# Patient Record
Sex: FEMALE | Race: WHITE | Employment: FULL TIME | URBAN - METROPOLITAN AREA
[De-identification: names, ages, dates, MRNs, and addresses within clinical notes are randomized per-mention and may not be internally consistent; named-entity substitution may affect disease eponyms.]

---

## 2018-04-12 ENCOUNTER — OFFICE VISIT (OUTPATIENT)
Dept: FAMILY MEDICINE CLINIC | Age: 38
End: 2018-04-12
Payer: MEDICAID

## 2018-04-12 VITALS
BODY MASS INDEX: 37.74 KG/M2 | WEIGHT: 213 LBS | DIASTOLIC BLOOD PRESSURE: 78 MMHG | HEART RATE: 83 BPM | OXYGEN SATURATION: 97 % | RESPIRATION RATE: 18 BRPM | SYSTOLIC BLOOD PRESSURE: 110 MMHG | TEMPERATURE: 98 F | HEIGHT: 63 IN

## 2018-04-12 DIAGNOSIS — J01.90 ACUTE BACTERIAL SINUSITIS: Primary | ICD-10-CM

## 2018-04-12 DIAGNOSIS — H69.80 DYSFUNCTION OF EUSTACHIAN TUBE, UNSPECIFIED LATERALITY: ICD-10-CM

## 2018-04-12 DIAGNOSIS — R68.89 FLU-LIKE SYMPTOMS: ICD-10-CM

## 2018-04-12 DIAGNOSIS — B96.89 ACUTE BACTERIAL SINUSITIS: Primary | ICD-10-CM

## 2018-04-12 LAB
INFLUENZA A ANTIGEN, POC: NORMAL
INFLUENZA B ANTIGEN, POC: NORMAL

## 2018-04-12 PROCEDURE — 99213 OFFICE O/P EST LOW 20 MIN: CPT | Performed by: PHYSICIAN ASSISTANT

## 2018-04-12 PROCEDURE — 87804 INFLUENZA ASSAY W/OPTIC: CPT | Performed by: PHYSICIAN ASSISTANT

## 2018-04-12 RX ORDER — AZITHROMYCIN 250 MG/1
TABLET, FILM COATED ORAL
Qty: 6 TABLET | Refills: 0 | Status: SHIPPED | OUTPATIENT
Start: 2018-04-12 | End: 2018-04-23 | Stop reason: CLARIF

## 2018-04-12 RX ORDER — PREDNISONE 20 MG/1
40 TABLET ORAL DAILY
Qty: 10 TABLET | Refills: 0 | Status: SHIPPED | OUTPATIENT
Start: 2018-04-12 | End: 2018-04-17

## 2018-04-20 ENCOUNTER — HOSPITAL ENCOUNTER (OUTPATIENT)
Dept: GENERAL RADIOLOGY | Age: 38
Discharge: HOME | End: 2018-04-22
Payer: MEDICAID

## 2018-04-20 ENCOUNTER — OFFICE VISIT (OUTPATIENT)
Dept: FAMILY MEDICINE CLINIC | Age: 38
End: 2018-04-20
Payer: MEDICAID

## 2018-04-20 ENCOUNTER — HOSPITAL ENCOUNTER (OUTPATIENT)
Age: 38
Discharge: HOME | End: 2018-04-22
Payer: MEDICAID

## 2018-04-20 VITALS
HEART RATE: 105 BPM | WEIGHT: 216 LBS | TEMPERATURE: 99.1 F | DIASTOLIC BLOOD PRESSURE: 70 MMHG | SYSTOLIC BLOOD PRESSURE: 100 MMHG | BODY MASS INDEX: 38.27 KG/M2 | RESPIRATION RATE: 12 BRPM | OXYGEN SATURATION: 96 % | HEIGHT: 63 IN

## 2018-04-20 DIAGNOSIS — J06.9 ACUTE URI: Primary | ICD-10-CM

## 2018-04-20 LAB
INFLUENZA A ANTIBODY: NEGATIVE
INFLUENZA B ANTIBODY: NEGATIVE

## 2018-04-20 PROCEDURE — 4004F PT TOBACCO SCREEN RCVD TLK: CPT | Performed by: PHYSICIAN ASSISTANT

## 2018-04-20 PROCEDURE — G8427 DOCREV CUR MEDS BY ELIG CLIN: HCPCS | Performed by: PHYSICIAN ASSISTANT

## 2018-04-20 PROCEDURE — G8417 CALC BMI ABV UP PARAM F/U: HCPCS | Performed by: PHYSICIAN ASSISTANT

## 2018-04-20 PROCEDURE — 87804 INFLUENZA ASSAY W/OPTIC: CPT | Performed by: PHYSICIAN ASSISTANT

## 2018-04-20 PROCEDURE — 99213 OFFICE O/P EST LOW 20 MIN: CPT | Performed by: PHYSICIAN ASSISTANT

## 2018-04-20 PROCEDURE — 71046 X-RAY EXAM CHEST 2 VIEWS: CPT

## 2018-04-20 RX ORDER — PREDNISONE 10 MG/1
TABLET ORAL
Qty: 20 TABLET | Refills: 0 | Status: SHIPPED | OUTPATIENT
Start: 2018-04-20 | End: 2018-04-30

## 2018-04-20 RX ORDER — FLUCONAZOLE 150 MG/1
150 TABLET ORAL DAILY
Qty: 2 TABLET | Refills: 0 | Status: SHIPPED | OUTPATIENT
Start: 2018-04-20 | End: 2018-04-22

## 2018-04-20 RX ORDER — LORATADINE AND PSEUDOEPHEDRINE 10; 240 MG/1; MG/1
1 TABLET, EXTENDED RELEASE ORAL DAILY
Qty: 7 TABLET | Refills: 0 | Status: SHIPPED | OUTPATIENT
Start: 2018-04-20 | End: 2018-04-27

## 2018-04-20 RX ORDER — FLUTICASONE PROPIONATE 50 MCG
1 SPRAY, SUSPENSION (ML) NASAL DAILY
Qty: 1 BOTTLE | Refills: 0 | Status: SHIPPED | OUTPATIENT
Start: 2018-04-20 | End: 2018-04-23 | Stop reason: CLARIF

## 2018-04-20 RX ORDER — DOXYCYCLINE HYCLATE 100 MG
100 TABLET ORAL 2 TIMES DAILY
Qty: 20 TABLET | Refills: 0 | Status: SHIPPED | OUTPATIENT
Start: 2018-04-20 | End: 2018-04-30

## 2018-04-23 ENCOUNTER — OFFICE VISIT (OUTPATIENT)
Dept: FAMILY MEDICINE CLINIC | Age: 38
End: 2018-04-23
Payer: MEDICAID

## 2018-04-23 VITALS
SYSTOLIC BLOOD PRESSURE: 128 MMHG | HEART RATE: 89 BPM | DIASTOLIC BLOOD PRESSURE: 86 MMHG | OXYGEN SATURATION: 97 % | HEIGHT: 63 IN | RESPIRATION RATE: 20 BRPM | BODY MASS INDEX: 38.59 KG/M2 | WEIGHT: 217.8 LBS | TEMPERATURE: 97.8 F

## 2018-04-23 DIAGNOSIS — J40 BRONCHITIS: ICD-10-CM

## 2018-04-23 DIAGNOSIS — B96.89 ACUTE BACTERIAL SINUSITIS: ICD-10-CM

## 2018-04-23 DIAGNOSIS — J01.90 ACUTE BACTERIAL SINUSITIS: ICD-10-CM

## 2018-04-23 DIAGNOSIS — J18.9 PNEUMONIA OF BOTH LOWER LOBES DUE TO INFECTIOUS ORGANISM: Primary | ICD-10-CM

## 2018-04-23 PROCEDURE — G8417 CALC BMI ABV UP PARAM F/U: HCPCS | Performed by: FAMILY MEDICINE

## 2018-04-23 PROCEDURE — 4004F PT TOBACCO SCREEN RCVD TLK: CPT | Performed by: FAMILY MEDICINE

## 2018-04-23 PROCEDURE — 96372 THER/PROPH/DIAG INJ SC/IM: CPT | Performed by: FAMILY MEDICINE

## 2018-04-23 PROCEDURE — 99213 OFFICE O/P EST LOW 20 MIN: CPT | Performed by: FAMILY MEDICINE

## 2018-04-23 PROCEDURE — G8427 DOCREV CUR MEDS BY ELIG CLIN: HCPCS | Performed by: FAMILY MEDICINE

## 2018-04-23 RX ORDER — FLUTICASONE FUROATE AND VILANTEROL 100; 25 UG/1; UG/1
1 POWDER RESPIRATORY (INHALATION) DAILY
Qty: 1 EACH | Refills: 0 | COMMUNITY
Start: 2018-04-23 | End: 2018-05-04 | Stop reason: ALTCHOICE

## 2018-04-23 RX ORDER — DEXAMETHASONE SODIUM PHOSPHATE 4 MG/ML
4 INJECTION, SOLUTION INTRA-ARTICULAR; INTRALESIONAL; INTRAMUSCULAR; INTRAVENOUS; SOFT TISSUE ONCE
Status: COMPLETED | OUTPATIENT
Start: 2018-04-23 | End: 2018-04-23

## 2018-04-23 RX ORDER — GUAIFENESIN/DEXTROMETHORPHAN 100-10MG/5
10 SYRUP ORAL 3 TIMES DAILY PRN
Qty: 240 ML | Refills: 3 | Status: SHIPPED | OUTPATIENT
Start: 2018-04-23 | End: 2018-05-03

## 2018-04-23 RX ADMIN — DEXAMETHASONE SODIUM PHOSPHATE 4 MG: 4 INJECTION, SOLUTION INTRA-ARTICULAR; INTRALESIONAL; INTRAMUSCULAR; INTRAVENOUS; SOFT TISSUE at 11:33

## 2018-04-23 ASSESSMENT — ENCOUNTER SYMPTOMS
DIARRHEA: 0
HEARTBURN: 0
PHOTOPHOBIA: 0
BLURRED VISION: 0
EYES NEGATIVE: 1
WHEEZING: 0
CONSTIPATION: 0
SHORTNESS OF BREATH: 1
SINUS PAIN: 0
BACK PAIN: 0
ORTHOPNEA: 0
STRIDOR: 0
EYE DISCHARGE: 0
RHINORRHEA: 0
COUGH: 1
DOUBLE VISION: 0
EYE REDNESS: 0
BLOOD IN STOOL: 0
GASTROINTESTINAL NEGATIVE: 1
EYE PAIN: 0
SORE THROAT: 1
HEMOPTYSIS: 0

## 2018-04-23 ASSESSMENT — PATIENT HEALTH QUESTIONNAIRE - PHQ9
1. LITTLE INTEREST OR PLEASURE IN DOING THINGS: 0
2. FEELING DOWN, DEPRESSED OR HOPELESS: 0
SUM OF ALL RESPONSES TO PHQ QUESTIONS 1-9: 0
SUM OF ALL RESPONSES TO PHQ9 QUESTIONS 1 & 2: 0

## 2018-04-30 ENCOUNTER — HOSPITAL ENCOUNTER (OUTPATIENT)
Age: 38
Discharge: HOME | End: 2018-05-02
Payer: MEDICAID

## 2018-04-30 ENCOUNTER — HOSPITAL ENCOUNTER (OUTPATIENT)
Dept: GENERAL RADIOLOGY | Age: 38
Discharge: HOME | End: 2018-05-02
Payer: MEDICAID

## 2018-04-30 ENCOUNTER — TELEPHONE (OUTPATIENT)
Dept: FAMILY MEDICINE CLINIC | Age: 38
End: 2018-04-30

## 2018-04-30 ENCOUNTER — NURSE ONLY (OUTPATIENT)
Dept: FAMILY MEDICINE CLINIC | Age: 38
End: 2018-04-30
Payer: MEDICAID

## 2018-04-30 DIAGNOSIS — J18.9 PNEUMONIA, UNSPECIFIED ORGANISM: Primary | ICD-10-CM

## 2018-04-30 DIAGNOSIS — J18.9 PNEUMONIA DUE TO INFECTIOUS ORGANISM, UNSPECIFIED LATERALITY, UNSPECIFIED PART OF LUNG: Primary | ICD-10-CM

## 2018-04-30 DIAGNOSIS — J18.9 PNEUMONIA DUE TO INFECTIOUS ORGANISM, UNSPECIFIED LATERALITY, UNSPECIFIED PART OF LUNG: ICD-10-CM

## 2018-04-30 PROCEDURE — 71046 X-RAY EXAM CHEST 2 VIEWS: CPT

## 2018-04-30 PROCEDURE — 96372 THER/PROPH/DIAG INJ SC/IM: CPT | Performed by: FAMILY MEDICINE

## 2018-04-30 RX ORDER — DEXAMETHASONE SODIUM PHOSPHATE 4 MG/ML
4 INJECTION, SOLUTION INTRA-ARTICULAR; INTRALESIONAL; INTRAMUSCULAR; INTRAVENOUS; SOFT TISSUE ONCE
Status: COMPLETED | OUTPATIENT
Start: 2018-04-30 | End: 2018-04-30

## 2018-04-30 RX ADMIN — DEXAMETHASONE SODIUM PHOSPHATE 4 MG: 4 INJECTION, SOLUTION INTRA-ARTICULAR; INTRALESIONAL; INTRAMUSCULAR; INTRAVENOUS; SOFT TISSUE at 13:39

## 2018-05-04 ENCOUNTER — OFFICE VISIT (OUTPATIENT)
Dept: FAMILY MEDICINE CLINIC | Age: 38
End: 2018-05-04
Payer: MEDICAID

## 2018-05-04 VITALS
BODY MASS INDEX: 38.45 KG/M2 | HEART RATE: 93 BPM | OXYGEN SATURATION: 98 % | HEIGHT: 63 IN | SYSTOLIC BLOOD PRESSURE: 134 MMHG | WEIGHT: 217 LBS | DIASTOLIC BLOOD PRESSURE: 76 MMHG | TEMPERATURE: 97.8 F

## 2018-05-04 DIAGNOSIS — R53.83 FATIGUE, UNSPECIFIED TYPE: ICD-10-CM

## 2018-05-04 DIAGNOSIS — J40 BRONCHITIS: Primary | ICD-10-CM

## 2018-05-04 DIAGNOSIS — R73.01 IFG (IMPAIRED FASTING GLUCOSE): ICD-10-CM

## 2018-05-04 DIAGNOSIS — R05.9 COUGH: ICD-10-CM

## 2018-05-04 DIAGNOSIS — E78.5 DYSLIPIDEMIA: ICD-10-CM

## 2018-05-04 PROCEDURE — 99213 OFFICE O/P EST LOW 20 MIN: CPT | Performed by: FAMILY MEDICINE

## 2018-05-04 PROCEDURE — 96372 THER/PROPH/DIAG INJ SC/IM: CPT | Performed by: FAMILY MEDICINE

## 2018-05-04 PROCEDURE — G8427 DOCREV CUR MEDS BY ELIG CLIN: HCPCS | Performed by: FAMILY MEDICINE

## 2018-05-04 PROCEDURE — G8417 CALC BMI ABV UP PARAM F/U: HCPCS | Performed by: FAMILY MEDICINE

## 2018-05-04 PROCEDURE — 4004F PT TOBACCO SCREEN RCVD TLK: CPT | Performed by: FAMILY MEDICINE

## 2018-05-04 RX ORDER — FLUTICASONE PROPIONATE 50 MCG
1 SPRAY, SUSPENSION (ML) NASAL DAILY
Qty: 1 BOTTLE | Refills: 3 | Status: SHIPPED | OUTPATIENT
Start: 2018-05-04 | End: 2018-09-27 | Stop reason: ALTCHOICE

## 2018-05-04 RX ORDER — METHYLPREDNISOLONE 4 MG/1
TABLET ORAL
Qty: 1 KIT | Refills: 0 | Status: SHIPPED | OUTPATIENT
Start: 2018-05-04 | End: 2018-05-10

## 2018-05-04 RX ORDER — AZITHROMYCIN 250 MG/1
TABLET, FILM COATED ORAL
Qty: 1 PACKET | Refills: 0 | Status: SHIPPED | OUTPATIENT
Start: 2018-05-04 | End: 2018-05-08

## 2018-05-04 RX ORDER — BUDESONIDE AND FORMOTEROL FUMARATE DIHYDRATE 160; 4.5 UG/1; UG/1
2 AEROSOL RESPIRATORY (INHALATION) 2 TIMES DAILY
Qty: 1 INHALER | Refills: 3 | COMMUNITY
Start: 2018-05-04 | End: 2018-09-27 | Stop reason: ALTCHOICE

## 2018-05-04 RX ORDER — DEXAMETHASONE SODIUM PHOSPHATE 4 MG/ML
4 INJECTION, SOLUTION INTRA-ARTICULAR; INTRALESIONAL; INTRAMUSCULAR; INTRAVENOUS; SOFT TISSUE ONCE
Status: COMPLETED | OUTPATIENT
Start: 2018-05-04 | End: 2018-05-04

## 2018-05-04 RX ORDER — GUAIFENESIN 600 MG/1
600 TABLET, EXTENDED RELEASE ORAL 2 TIMES DAILY
Qty: 100 TABLET | Refills: 3 | Status: SHIPPED | OUTPATIENT
Start: 2018-05-04 | End: 2018-09-27 | Stop reason: ALTCHOICE

## 2018-05-04 RX ADMIN — DEXAMETHASONE SODIUM PHOSPHATE 4 MG: 4 INJECTION, SOLUTION INTRA-ARTICULAR; INTRALESIONAL; INTRAMUSCULAR; INTRAVENOUS; SOFT TISSUE at 11:48

## 2018-05-04 ASSESSMENT — ENCOUNTER SYMPTOMS
SINUS PAIN: 0
BACK PAIN: 0
SORE THROAT: 0
EYES NEGATIVE: 1
NAUSEA: 0
BLOOD IN STOOL: 0
PHOTOPHOBIA: 0
HEMOPTYSIS: 0
EYE DISCHARGE: 0
HEARTBURN: 0
CONSTIPATION: 0
BLURRED VISION: 0
EYE REDNESS: 0
STRIDOR: 0
GASTROINTESTINAL NEGATIVE: 1
ORTHOPNEA: 0
DIARRHEA: 0
DOUBLE VISION: 0
VOMITING: 0
SHORTNESS OF BREATH: 0
EYE PAIN: 0
RHINORRHEA: 0
COUGH: 1
ABDOMINAL PAIN: 0
WHEEZING: 0

## 2018-07-19 ENCOUNTER — HOSPITAL ENCOUNTER (EMERGENCY)
Age: 38
Discharge: HOME | End: 2018-07-20
Attending: EMERGENCY MEDICINE
Payer: MEDICAID

## 2018-07-19 VITALS
SYSTOLIC BLOOD PRESSURE: 154 MMHG | DIASTOLIC BLOOD PRESSURE: 96 MMHG | HEIGHT: 62 IN | TEMPERATURE: 98 F | WEIGHT: 220 LBS | HEART RATE: 104 BPM | OXYGEN SATURATION: 98 % | RESPIRATION RATE: 16 BRPM | BODY MASS INDEX: 40.48 KG/M2

## 2018-07-19 DIAGNOSIS — S05.02XA ABRASION OF LEFT CORNEA, INITIAL ENCOUNTER: Primary | ICD-10-CM

## 2018-07-19 PROCEDURE — 99282 EMERGENCY DEPT VISIT SF MDM: CPT

## 2018-07-19 PROCEDURE — 6370000000 HC RX 637 (ALT 250 FOR IP): Performed by: EMERGENCY MEDICINE

## 2018-07-19 RX ORDER — TETRACAINE HYDROCHLORIDE 5 MG/ML
2 SOLUTION OPHTHALMIC ONCE
Status: COMPLETED | OUTPATIENT
Start: 2018-07-19 | End: 2018-07-19

## 2018-07-19 RX ORDER — TOBRAMYCIN 3 MG/ML
1 SOLUTION/ DROPS OPHTHALMIC EVERY 4 HOURS
Qty: 1 BOTTLE | Refills: 0 | Status: SHIPPED | OUTPATIENT
Start: 2018-07-19 | End: 2018-07-29

## 2018-07-19 RX ORDER — OXYCODONE HYDROCHLORIDE AND ACETAMINOPHEN 5; 325 MG/1; MG/1
1 TABLET ORAL ONCE
Status: COMPLETED | OUTPATIENT
Start: 2018-07-19 | End: 2018-07-19

## 2018-07-19 RX ORDER — HYDROCODONE BITARTRATE AND ACETAMINOPHEN 5; 325 MG/1; MG/1
1 TABLET ORAL EVERY 6 HOURS PRN
Qty: 12 TABLET | Refills: 0 | Status: SHIPPED | OUTPATIENT
Start: 2018-07-19 | End: 2018-07-22

## 2018-07-19 RX ADMIN — TETRACAINE HYDROCHLORIDE 2 DROP: 5 SOLUTION OPHTHALMIC at 22:37

## 2018-07-19 RX ADMIN — OXYCODONE HYDROCHLORIDE AND ACETAMINOPHEN 1 TABLET: 5; 325 TABLET ORAL at 23:56

## 2018-07-19 RX ADMIN — FLUORESCEIN SODIUM 1 EACH: 0.6 STRIP OPHTHALMIC at 22:37

## 2018-07-19 ASSESSMENT — PAIN SCALES - GENERAL
PAINLEVEL_OUTOF10: 9
PAINLEVEL_OUTOF10: 9

## 2018-07-19 ASSESSMENT — PAIN DESCRIPTION - PAIN TYPE: TYPE: ACUTE PAIN

## 2018-07-19 ASSESSMENT — PAIN DESCRIPTION - ORIENTATION: ORIENTATION: LEFT

## 2018-07-19 ASSESSMENT — PAIN DESCRIPTION - LOCATION: LOCATION: EYE

## 2018-07-20 ASSESSMENT — ENCOUNTER SYMPTOMS
SHORTNESS OF BREATH: 0
WHEEZING: 0
COUGH: 0
NAUSEA: 0
SORE THROAT: 0
BLURRED VISION: 1
EYE PAIN: 1
VOMITING: 0
EYE INFLAMMATION: 1
PERI-ORBITAL EDEMA: 1
ABDOMINAL DISTENTION: 0
DIARRHEA: 0
SINUS PRESSURE: 0
EYE WATERING: 1
BACK PAIN: 0
EYE REDNESS: 0
PHOTOPHOBIA: 1
EYE DISCHARGE: 0

## 2018-07-20 NOTE — ED PROVIDER NOTES
51-year-old female presenting with a left eye injury approx. 3 hours prior to arrival. States she was playing with her dog when the dog suddenly kicked time leg scratcher in the left eye. She states she is having trouble seeing out of the eye due to pain. States she  is unable to open the eye due to pain. She denies LOC. Denies contact lens use. The history is provided by the patient. Eye Problem   Location:  Left eye  Quality:  Aching, burning and stabbing  Severity:  Severe  Onset quality:  Sudden  Duration:  3 hours  Timing:  Constant  Progression:  Unchanged  Chronicity:  New  Context: direct trauma    Context: not contact lens problem    Relieved by:  Nothing  Worsened by:  Bright light  Ineffective treatments:  Closing eye and darkened room  Associated symptoms: blurred vision, inflammation, photophobia, swelling and tearing    Associated symptoms: no discharge, no headaches, no nausea, no redness, no vomiting and no weakness    Risk factors: no previous injury to eye        Review of Systems   Constitutional: Negative for chills and fever. HENT: Negative for ear pain, sinus pressure and sore throat. Eyes: Positive for blurred vision, photophobia and pain. Negative for discharge and redness. Respiratory: Negative for cough, shortness of breath and wheezing. Cardiovascular: Negative for chest pain. Gastrointestinal: Negative for abdominal distention, diarrhea, nausea and vomiting. Genitourinary: Negative for dysuria and frequency. Musculoskeletal: Negative for arthralgias and back pain. Skin: Negative for rash and wound. Neurological: Negative for weakness and headaches. Hematological: Negative for adenopathy. All other systems reviewed and are negative. Physical Exam   Constitutional: She is oriented to person, place, and time. She appears well-developed and well-nourished. HENT:   Head: Normocephalic and atraumatic.    Eyes: Conjunctivae are normal.   Left eye History:  reports that she has been smoking Cigarettes. She has a 7.50 pack-year smoking history. She has never used smokeless tobacco. She reports that she drinks alcohol. She reports that she does not use drugs. Family History: family history includes Arthritis in her mother; Asthma in her mother; Diabetes in her paternal grandmother; Heart Disease in her maternal grandmother; Heart Failure in her paternal grandmother; High Blood Pressure in her mother. The patients home medications have been reviewed. Allergies: Cephalexin and Pcn [penicillins]    -------------------------------------------------- RESULTS -------------------------------------------------  Labs:  No results found for this visit on 07/19/18. Radiology:  No orders to display       ------------------------- NURSING NOTES AND VITALS REVIEWED ---------------------------  Date / Time Roomed:  7/19/2018 10:10 PM  ED Bed Assignment:  01/01    The nursing notes within the ED encounter and vital signs as below have been reviewed. BP (!) 154/96   Pulse 104   Temp 98 °F (36.7 °C)   Resp 16   Ht 5' 2\" (1.575 m)   Wt 220 lb (99.8 kg)   LMP 08/31/2017 (Exact Date)   SpO2 98%   BMI 40.24 kg/m²   Oxygen Saturation Interpretation: Normal      ------------------------------------------ PROGRESS NOTES ------------------------------------------  11:23 PM  I have spoken with the patient and discussed todays results, in addition to providing specific details for the plan of care and counseling regarding the diagnosis and prognosis. Their questions are answered at this time and they are agreeable with the plan. I discussed at length with them reasons for immediate return here for re evaluation. They will followup with their opthalmologist and primary care physician by calling their office tomorrow.       --------------------------------- ADDITIONAL PROVIDER NOTES ---------------------------------  At this time the patient is without objective evidence of an acute process requiring hospitalization or inpatient management. They have remained hemodynamically stable throughout their entire ED visit and are stable for discharge with outpatient follow-up. The plan has been discussed in detail and they are aware of the specific conditions for emergent return, as well as the importance of follow-up. Discharge Medication List as of 7/19/2018 11:24 PM      START taking these medications    Details   HYDROcodone-acetaminophen (NORCO) 5-325 MG per tablet Take 1 tablet by mouth every 6 hours as needed for Pain for up to 3 days. ., Disp-12 tablet, R-0Print      tobramycin (TOBREX) 0.3 % ophthalmic solution Place 1 drop into the left eye every 4 hours for 10 days, Disp-1 Bottle, R-0Print             Diagnosis:  1. Abrasion of left cornea, initial encounter        Disposition:  Patient's disposition: Discharge to home  Patient's condition is stable.            Kwadwo Ferguson  Resident  07/20/18 [id number]

## 2019-08-27 ENCOUNTER — OFFICE VISIT (OUTPATIENT)
Dept: CHIROPRACTIC MEDICINE | Age: 39
End: 2019-08-27
Payer: MEDICAID

## 2019-08-27 ENCOUNTER — OFFICE VISIT (OUTPATIENT)
Dept: FAMILY MEDICINE CLINIC | Age: 39
End: 2019-08-27
Payer: MEDICAID

## 2019-08-27 VITALS
TEMPERATURE: 97.6 F | SYSTOLIC BLOOD PRESSURE: 124 MMHG | DIASTOLIC BLOOD PRESSURE: 82 MMHG | HEART RATE: 104 BPM | BODY MASS INDEX: 39.64 KG/M2 | OXYGEN SATURATION: 99 % | WEIGHT: 215.4 LBS | RESPIRATION RATE: 20 BRPM | HEIGHT: 62 IN

## 2019-08-27 DIAGNOSIS — M54.2 NECK PAIN: Primary | ICD-10-CM

## 2019-08-27 DIAGNOSIS — M99.02 SEGMENTAL AND SOMATIC DYSFUNCTION OF THORACIC REGION: ICD-10-CM

## 2019-08-27 DIAGNOSIS — M54.12 CERVICAL RADICULOPATHY: ICD-10-CM

## 2019-08-27 DIAGNOSIS — M99.01 SEGMENTAL AND SOMATIC DYSFUNCTION OF CERVICAL REGION: Primary | ICD-10-CM

## 2019-08-27 DIAGNOSIS — M54.04 PANNICULITIS AFFECTING REGIONS OF NECK AND BACK, THORACIC REGION: ICD-10-CM

## 2019-08-27 DIAGNOSIS — M54.2 CERVICALGIA: ICD-10-CM

## 2019-08-27 PROCEDURE — G0283 ELEC STIM OTHER THAN WOUND: HCPCS | Performed by: CHIROPRACTOR

## 2019-08-27 PROCEDURE — 99214 OFFICE O/P EST MOD 30 MIN: CPT | Performed by: PHYSICIAN ASSISTANT

## 2019-08-27 PROCEDURE — 96372 THER/PROPH/DIAG INJ SC/IM: CPT | Performed by: PHYSICIAN ASSISTANT

## 2019-08-27 PROCEDURE — 98940 CHIROPRACT MANJ 1-2 REGIONS: CPT | Performed by: CHIROPRACTOR

## 2019-08-27 PROCEDURE — 99203 OFFICE O/P NEW LOW 30 MIN: CPT | Performed by: CHIROPRACTOR

## 2019-08-27 RX ORDER — METHYLPREDNISOLONE ACETATE 80 MG/ML
80 INJECTION, SUSPENSION INTRA-ARTICULAR; INTRALESIONAL; INTRAMUSCULAR; SOFT TISSUE ONCE
Status: COMPLETED | OUTPATIENT
Start: 2019-08-27 | End: 2019-08-27

## 2019-08-27 RX ORDER — KETOROLAC TROMETHAMINE 30 MG/ML
30 INJECTION, SOLUTION INTRAMUSCULAR; INTRAVENOUS ONCE
Status: COMPLETED | OUTPATIENT
Start: 2019-08-27 | End: 2019-08-27

## 2019-08-27 RX ORDER — PREDNISONE 20 MG/1
TABLET ORAL
Qty: 18 TABLET | Refills: 0 | Status: SHIPPED | OUTPATIENT
Start: 2019-08-27 | End: 2019-10-01

## 2019-08-27 RX ORDER — METHOCARBAMOL 750 MG/1
750 TABLET, FILM COATED ORAL 4 TIMES DAILY
Qty: 40 TABLET | Refills: 0 | Status: SHIPPED | OUTPATIENT
Start: 2019-08-27 | End: 2019-09-06

## 2019-08-27 RX ADMIN — METHYLPREDNISOLONE ACETATE 80 MG: 80 INJECTION, SUSPENSION INTRA-ARTICULAR; INTRALESIONAL; INTRAMUSCULAR; SOFT TISSUE at 09:36

## 2019-08-27 RX ADMIN — KETOROLAC TROMETHAMINE 30 MG: 30 INJECTION, SOLUTION INTRAMUSCULAR; INTRAVENOUS at 09:32

## 2019-08-27 NOTE — PROGRESS NOTES
History   Problem Relation Age of Onset    Arthritis Mother     High Blood Pressure Mother     Asthma Mother     Heart Disease Maternal Grandmother     Diabetes Paternal Grandmother     Heart Failure Paternal Grandmother      Past Surgical History:   Procedure Laterality Date     SECTION  2010    DILATION AND CURETTAGE      suction D&C    DILATION AND CURETTAGE OF UTERUS  10/2/2009    DILATION AND CURETTAGE OF UTERUS  2016    andrew novosure ablation    HYSTERECTOMY, TOTAL ABDOMINAL      [facility]h    LEEP      OTHER SURGICAL HISTORY  2012    excision lower sinus tract abdomen    OTHER SURGICAL HISTORY  2012    [facility]    OTHER SURGICAL HISTORY  2001    SICK 14 D. HOSPITALIZED AFTER 1ST BABY    OTHER SURGICAL HISTORY  10/18/2017    [facility]     Social History     Socioeconomic History    Marital status:      Spouse name: Not on file    Number of children: Not on file    Years of education: Not on file    Highest education level: Not on file   Occupational History    Not on file   Social Needs    Financial resource strain: Not on file    Food insecurity:     Worry: Not on file     Inability: Not on file    Transportation needs:     Medical: Not on file     Non-medical: Not on file   Tobacco Use    Smoking status: Current Every Day Smoker     Packs/day: 0.50     Years: 15.00     Pack years: 7.50     Types: Cigarettes     Last attempt to quit: 11/15/2014     Years since quittin.7    Smokeless tobacco: Never Used   Substance and Sexual Activity    Alcohol use:  Yes     Alcohol/week: 0.0 standard drinks     Comment: rare    Drug use: No    Sexual activity: Yes     Partners: Male     Birth control/protection: Surgical     Comment: ESSURE   Lifestyle    Physical activity:     Days per week: Not on file     Minutes per session: Not on file    Stress: Not on file   Relationships    Social connections:     Talks on phone: Not on file     Gets together: Not on file

## 2019-08-27 NOTE — PROGRESS NOTES
19  Zora Wall : 1980 Sex: female  Age 41 y.o. Subjective:  Chief Complaint   Patient presents with    Neck Pain     back of neck- arm pain radiating to finger for two weeks          HPI:   Zora Wall , 41 y.o. female presents to [facility] care for evaluation of right side of neck pain. The patient has had this right-sided neck pain that is been ongoing for the last 2 weeks. Patient states that she woke up one morning with pain on the right side. The patient states that the pain fluctuates and radiates down into her back and then down her right arm. The patient denies any headaches. The patient is not having any fevers or chills. No visual disturbances. No anterior neck pain. Patient has not really been taking anything for the pain or discomfort. The patient denies direct trauma to the neck. The patient is not having any dysuria hematuria. ROS:   Unless otherwise stated in this report the patient's positive and negative responses for review of systems for constitutional, eyes, ENT, cardiovascular, respiratory, gastrointestinal, neurological, , musculoskeletal, and integument systems and related systems to the presenting problem are either stated in the history of present illness or were not pertinent or were negative for the symptoms and/or complaints related to the presenting medical problem. Positives and pertinent negatives as per HPI. All others reviewed and are negative.       PMH:     Past Medical History:   Diagnosis Date    Abnormal Pap smear     Abnormal Pap smear of cervix     Abscess     Abscess     on legs in past, last on 10/1/16 no current abscess, no drainage    Anesthesia     pt wakes up \" freaking out \" per pt    Cancer [facility])     cervical    Cervical cancer ([facility] [address]     Gestational diabetes mellitus, antepartum 2012    no current issues, diet controlled    Hypotension        Past Surgical History:   Procedure Laterality Date     SECTION obvious deformity noted to the neck. The patient has diffuse tenderness noted to the right paracervical musculature into the right trapezius and down into the right parathoracic musculature. Patient has limited range of motion of the neck due to pain. The patient had 5/5 strength with left and right lateral rotation. As well as with flexion and extension. The patient had no midline tenderness. No step-offs or crepitus noted. Cardiovascular: Regular Rate and Rhythm  Respiratory: Patient is in no distress, no accessory muscle use, lungs are clear to auscultation, no wheezing, crackles or rhonchi  Back: Mild tenderness as stated above to the right parathoracic musculature. No left-sided tenderness. No midline tenderness. No lower lumbar tenderness. No evidence of bladder or bowel incontinence. GI: Normal bowel sounds, no tenderness to palpation, no masses appreciated. No rebound, guarding, or rigidity noted. Musculoskeletal: The patient has no evidence of calf tenderness, no pitting edema, symmetrical pulses noted bilaterally  Neurological: A&O x4, normal speech, normal equal  strength, normal strength 5/5 of the upper extremities, normal sensation        Testing:     Xr Cervical Spine (2-3 Views)    Result Date: 8/27/2019  LOCATION: 200 EXAM: XR CERVICAL SPINE (2-3 VIEWS) COMPARISON: None HISTORY: neck pain with radiculopathy TECHNIQUE: 3 of the cervical spine were obtained. FINDINGS: Vertebral body heights and spinal alignment is maintained. The spinal pedicles as well as the transverse and spinous processes are well visualized. There is no evidence of fracture or malalignment. Soft tissues appear normal.     No radiographic abnormalities. Medical Decision Making:     The patient on arrival does not appear to be in any apparent distress or discomfort. She was slightly tachycardic initially. The patient will be sent for an x-ray of the cervical spine.     The patient had x-rays of the cervical spine.  Did not appear to have any acute abnormality on the x-ray but formal radiology report is pending at this time. I spoke with Dr. Omega Aschoff here in the office and he will be able to evaluate the patient for the neck pain/cervicalgia and radiculopathy. The patient will be given intramuscular injection of methylprednisone and ketorolac. We will also give the patient a prescription for prednisone and Robaxin. The patient will follow-up with chiropractor. The patient will continue therapies as discussed in her next appointment. The patient can actually be seen at 9:30 AM.      Clinical Impression:   Madhavi Botello was seen today for neck pain. Diagnoses and all orders for this visit:    Neck pain  -     XR CERVICAL SPINE (2-3 VIEWS); Future    Cervical radiculopathy    Other orders  -     methylPREDNISolone acetate (DEPO-MEDROL) injection 80 mg  -     ketorolac (TORADOL) injection 30 mg  -     methocarbamol (ROBAXIN-750) 750 MG tablet; Take 1 tablet by mouth 4 times daily for 10 days  -     predniSONE (DELTASONE) 20 MG tablet; 3 tablets once daily for 3 days, 2 tablets once daily for 3 days, one tablet once daily for 3 days        The patient is to call for any concerns or return if any of the signs or symptoms worsen. The patient is to follow-up with PCP in the next 2-3 days for repeat evaluation repeat assessment or go directly to the emergency department.      SIGNATURE: Thalia Barrow III, PA-C

## 2019-08-29 ENCOUNTER — OFFICE VISIT (OUTPATIENT)
Dept: CHIROPRACTIC MEDICINE | Age: 39
End: 2019-08-29
Payer: MEDICAID

## 2019-08-29 DIAGNOSIS — M99.01 SEGMENTAL AND SOMATIC DYSFUNCTION OF CERVICAL REGION: Primary | ICD-10-CM

## 2019-08-29 DIAGNOSIS — M54.2 CERVICALGIA: ICD-10-CM

## 2019-08-29 DIAGNOSIS — M54.04 PANNICULITIS AFFECTING REGIONS OF NECK AND BACK, THORACIC REGION: ICD-10-CM

## 2019-08-29 DIAGNOSIS — M99.02 SEGMENTAL AND SOMATIC DYSFUNCTION OF THORACIC REGION: ICD-10-CM

## 2019-08-29 PROCEDURE — 98940 CHIROPRACT MANJ 1-2 REGIONS: CPT | Performed by: CHIROPRACTOR

## 2019-09-05 ENCOUNTER — OFFICE VISIT (OUTPATIENT)
Dept: CHIROPRACTIC MEDICINE | Age: 39
End: 2019-09-05
Payer: MEDICAID

## 2019-09-05 DIAGNOSIS — M54.12 CERVICAL RADICULOPATHY: ICD-10-CM

## 2019-09-05 DIAGNOSIS — M99.01 SEGMENTAL AND SOMATIC DYSFUNCTION OF CERVICAL REGION: Primary | ICD-10-CM

## 2019-09-05 DIAGNOSIS — M99.02 SEGMENTAL AND SOMATIC DYSFUNCTION OF THORACIC REGION: ICD-10-CM

## 2019-09-05 DIAGNOSIS — M54.04 PANNICULITIS AFFECTING REGIONS OF NECK AND BACK, THORACIC REGION: ICD-10-CM

## 2019-09-05 PROCEDURE — 98940 CHIROPRACT MANJ 1-2 REGIONS: CPT | Performed by: CHIROPRACTOR

## 2019-09-09 ENCOUNTER — OFFICE VISIT (OUTPATIENT)
Dept: CHIROPRACTIC MEDICINE | Age: 39
End: 2019-09-09
Payer: MEDICAID

## 2019-09-09 DIAGNOSIS — M99.01 SEGMENTAL AND SOMATIC DYSFUNCTION OF CERVICAL REGION: Primary | ICD-10-CM

## 2019-09-09 DIAGNOSIS — M54.12 CERVICAL RADICULOPATHY: ICD-10-CM

## 2019-09-09 DIAGNOSIS — M99.02 SEGMENTAL AND SOMATIC DYSFUNCTION OF THORACIC REGION: ICD-10-CM

## 2019-09-09 DIAGNOSIS — M54.04 PANNICULITIS AFFECTING REGIONS OF NECK AND BACK, THORACIC REGION: ICD-10-CM

## 2019-09-09 PROCEDURE — 98940 CHIROPRACT MANJ 1-2 REGIONS: CPT | Performed by: CHIROPRACTOR

## 2019-09-11 ENCOUNTER — OFFICE VISIT (OUTPATIENT)
Dept: CHIROPRACTIC MEDICINE | Age: 39
End: 2019-09-11
Payer: MEDICAID

## 2019-09-11 DIAGNOSIS — M54.04 PANNICULITIS AFFECTING REGIONS OF NECK AND BACK, THORACIC REGION: ICD-10-CM

## 2019-09-11 DIAGNOSIS — M99.02 SEGMENTAL AND SOMATIC DYSFUNCTION OF THORACIC REGION: ICD-10-CM

## 2019-09-11 DIAGNOSIS — M54.12 CERVICAL RADICULOPATHY: ICD-10-CM

## 2019-09-11 DIAGNOSIS — M99.01 SEGMENTAL AND SOMATIC DYSFUNCTION OF CERVICAL REGION: Primary | ICD-10-CM

## 2019-09-11 PROCEDURE — 99999 PR OFFICE/OUTPT VISIT,PROCEDURE ONLY: CPT | Performed by: CHIROPRACTOR

## 2019-09-11 PROCEDURE — 98940 CHIROPRACT MANJ 1-2 REGIONS: CPT | Performed by: CHIROPRACTOR

## 2019-10-24 ENCOUNTER — OFFICE VISIT (OUTPATIENT)
Dept: FAMILY MEDICINE CLINIC | Age: 39
End: 2019-10-24
Payer: MEDICAID

## 2019-10-24 VITALS
SYSTOLIC BLOOD PRESSURE: 102 MMHG | TEMPERATURE: 97.9 F | WEIGHT: 208.8 LBS | HEART RATE: 95 BPM | OXYGEN SATURATION: 97 % | BODY MASS INDEX: 38.19 KG/M2 | DIASTOLIC BLOOD PRESSURE: 80 MMHG

## 2019-10-24 DIAGNOSIS — J01.00 ACUTE NON-RECURRENT MAXILLARY SINUSITIS: Primary | ICD-10-CM

## 2019-10-24 DIAGNOSIS — R05.9 COUGH: ICD-10-CM

## 2019-10-24 PROCEDURE — G8484 FLU IMMUNIZE NO ADMIN: HCPCS | Performed by: FAMILY MEDICINE

## 2019-10-24 PROCEDURE — G8427 DOCREV CUR MEDS BY ELIG CLIN: HCPCS | Performed by: FAMILY MEDICINE

## 2019-10-24 PROCEDURE — G8417 CALC BMI ABV UP PARAM F/U: HCPCS | Performed by: FAMILY MEDICINE

## 2019-10-24 PROCEDURE — 4004F PT TOBACCO SCREEN RCVD TLK: CPT | Performed by: FAMILY MEDICINE

## 2019-10-24 PROCEDURE — 99213 OFFICE O/P EST LOW 20 MIN: CPT | Performed by: FAMILY MEDICINE

## 2019-10-24 RX ORDER — SULFAMETHOXAZOLE AND TRIMETHOPRIM 800; 160 MG/1; MG/1
1 TABLET ORAL 2 TIMES DAILY
Qty: 14 TABLET | Refills: 0 | Status: SHIPPED | OUTPATIENT
Start: 2019-10-24 | End: 2019-10-31

## 2019-10-24 RX ORDER — FLUCONAZOLE 150 MG/1
150 TABLET ORAL ONCE
Qty: 1 TABLET | Refills: 1 | Status: SHIPPED | OUTPATIENT
Start: 2019-10-24 | End: 2019-10-24

## 2019-10-24 RX ORDER — FLUTICASONE FUROATE AND VILANTEROL 200; 25 UG/1; UG/1
1 POWDER RESPIRATORY (INHALATION) DAILY
Qty: 1 EACH | Refills: 0 | Status: SHIPPED | OUTPATIENT
Start: 2019-10-24 | End: 2019-11-23

## 2019-10-24 ASSESSMENT — ENCOUNTER SYMPTOMS
SINUS PAIN: 1
SORE THROAT: 1
EYE DISCHARGE: 0
DIARRHEA: 0
ABDOMINAL PAIN: 0
COUGH: 1
RHINORRHEA: 1
SHORTNESS OF BREATH: 0
NAUSEA: 0
VOMITING: 0
SINUS PRESSURE: 1
WHEEZING: 0
CONSTIPATION: 0

## 2019-10-29 ENCOUNTER — TELEPHONE (OUTPATIENT)
Dept: PRIMARY CARE CLINIC | Age: 39
End: 2019-10-29

## 2019-10-29 DIAGNOSIS — R05.9 COUGH: Primary | ICD-10-CM

## 2019-10-29 RX ORDER — FLUTICASONE PROPIONATE AND SALMETEROL 113; 14 UG/1; UG/1
1 POWDER, METERED RESPIRATORY (INHALATION) 2 TIMES DAILY
Qty: 1 EACH | Refills: 5 | Status: SHIPPED | OUTPATIENT
Start: 2019-10-29 | End: 2019-12-30 | Stop reason: ALTCHOICE

## 2019-12-30 ENCOUNTER — OFFICE VISIT (OUTPATIENT)
Dept: FAMILY MEDICINE CLINIC | Age: 39
End: 2019-12-30
Payer: MEDICAID

## 2019-12-30 VITALS
WEIGHT: 213.4 LBS | DIASTOLIC BLOOD PRESSURE: 82 MMHG | BODY MASS INDEX: 39.27 KG/M2 | TEMPERATURE: 98.4 F | OXYGEN SATURATION: 98 % | HEIGHT: 62 IN | RESPIRATION RATE: 18 BRPM | SYSTOLIC BLOOD PRESSURE: 128 MMHG | HEART RATE: 80 BPM

## 2019-12-30 DIAGNOSIS — R09.82 POSTNASAL DRIP: ICD-10-CM

## 2019-12-30 DIAGNOSIS — R10.9 FLANK PAIN: Primary | ICD-10-CM

## 2019-12-30 DIAGNOSIS — L02.91 ABSCESS: ICD-10-CM

## 2019-12-30 DIAGNOSIS — L03.115 CELLULITIS OF RIGHT THIGH: ICD-10-CM

## 2019-12-30 DIAGNOSIS — J06.9 UPPER RESPIRATORY TRACT INFECTION, UNSPECIFIED TYPE: ICD-10-CM

## 2019-12-30 DIAGNOSIS — J01.90 ACUTE NON-RECURRENT SINUSITIS, UNSPECIFIED LOCATION: ICD-10-CM

## 2019-12-30 LAB
BILIRUBIN, POC: NORMAL
BLOOD URINE, POC: NEGATIVE
CLARITY, POC: CLEAR
COLOR, POC: YELLOW
GLUCOSE URINE, POC: NEGATIVE
KETONES, POC: NEGATIVE
LEUKOCYTE EST, POC: NEGATIVE
NITRITE, POC: NEGATIVE
PH, POC: 5
PROTEIN, POC: NEGATIVE
SPECIFIC GRAVITY, POC: 1.03
UROBILINOGEN, POC: 0.2

## 2019-12-30 PROCEDURE — G8427 DOCREV CUR MEDS BY ELIG CLIN: HCPCS | Performed by: PHYSICIAN ASSISTANT

## 2019-12-30 PROCEDURE — G8417 CALC BMI ABV UP PARAM F/U: HCPCS | Performed by: PHYSICIAN ASSISTANT

## 2019-12-30 PROCEDURE — 99214 OFFICE O/P EST MOD 30 MIN: CPT | Performed by: PHYSICIAN ASSISTANT

## 2019-12-30 PROCEDURE — 81002 URINALYSIS NONAUTO W/O SCOPE: CPT | Performed by: PHYSICIAN ASSISTANT

## 2019-12-30 PROCEDURE — 4004F PT TOBACCO SCREEN RCVD TLK: CPT | Performed by: PHYSICIAN ASSISTANT

## 2019-12-30 PROCEDURE — G8484 FLU IMMUNIZE NO ADMIN: HCPCS | Performed by: PHYSICIAN ASSISTANT

## 2019-12-30 RX ORDER — CLINDAMYCIN HYDROCHLORIDE 300 MG/1
300 CAPSULE ORAL 4 TIMES DAILY
Qty: 40 CAPSULE | Refills: 0 | Status: SHIPPED | OUTPATIENT
Start: 2019-12-30 | End: 2020-01-09

## 2019-12-30 RX ORDER — DOXYCYCLINE HYCLATE 100 MG
100 TABLET ORAL 2 TIMES DAILY
Qty: 20 TABLET | Refills: 0 | Status: SHIPPED | OUTPATIENT
Start: 2019-12-30 | End: 2020-01-09

## 2020-08-03 ENCOUNTER — OFFICE VISIT (OUTPATIENT)
Dept: FAMILY MEDICINE CLINIC | Age: 40
End: 2020-08-03
Payer: MEDICAID

## 2020-08-03 VITALS
WEIGHT: 218 LBS | OXYGEN SATURATION: 99 % | RESPIRATION RATE: 18 BRPM | HEIGHT: 62 IN | DIASTOLIC BLOOD PRESSURE: 78 MMHG | SYSTOLIC BLOOD PRESSURE: 118 MMHG | HEART RATE: 86 BPM | BODY MASS INDEX: 40.12 KG/M2 | TEMPERATURE: 97.6 F

## 2020-08-03 PROCEDURE — G8427 DOCREV CUR MEDS BY ELIG CLIN: HCPCS | Performed by: PHYSICIAN ASSISTANT

## 2020-08-03 PROCEDURE — G8417 CALC BMI ABV UP PARAM F/U: HCPCS | Performed by: PHYSICIAN ASSISTANT

## 2020-08-03 PROCEDURE — 4004F PT TOBACCO SCREEN RCVD TLK: CPT | Performed by: PHYSICIAN ASSISTANT

## 2020-08-03 PROCEDURE — 99213 OFFICE O/P EST LOW 20 MIN: CPT | Performed by: PHYSICIAN ASSISTANT

## 2020-08-03 RX ORDER — METHYLPREDNISOLONE 4 MG/1
TABLET ORAL
Qty: 1 KIT | Refills: 0 | Status: SHIPPED
Start: 2020-08-03 | End: 2020-09-16

## 2020-08-03 RX ORDER — DOXYCYCLINE HYCLATE 100 MG
100 TABLET ORAL 2 TIMES DAILY
Qty: 20 TABLET | Refills: 0 | Status: SHIPPED | OUTPATIENT
Start: 2020-08-03 | End: 2020-08-13

## 2020-08-03 RX ORDER — CHLORPHENIRAMINE MALEATE 4 MG/1
4 TABLET ORAL EVERY 6 HOURS PRN
Qty: 20 TABLET | Refills: 0 | Status: SHIPPED
Start: 2020-08-03 | End: 2021-02-15 | Stop reason: ALTCHOICE

## 2020-08-03 RX ORDER — FLUCONAZOLE 150 MG/1
TABLET ORAL
Qty: 2 TABLET | Refills: 0 | Status: SHIPPED
Start: 2020-08-03 | End: 2021-02-15 | Stop reason: ALTCHOICE

## 2020-08-03 NOTE — PROGRESS NOTES
8/3/20  Mayra Sullivan : 1980 Sex: female  Age 42 y.o. Subjective:  Chief Complaint   Patient presents with   [facility]     pt states right sided ear pain and gum pain          HPI:   Mayra Sullivan , 42 y.o. female presents to express care for evaluation of right ear pain, nasal congestion, rhinorrhea. The patient has had the symptoms ongoing for the better part of the week now. The patient thought it may be related to some dental pain but she is really not having any pain with chewing or palpation of any of the teeth. The patient is not having any fever, chills, cough or shortness of breath. The patient has noted the symptoms to be worse in the morning and gets a little bit better throughout the course of the day. The patient really is not taking anything. The patient is still smoking. The patient states that she did try to quit but gained 20 pounds. The patient also has noted that she is cut down to half a pack a day. ROS:   Unless otherwise stated in this report the patient's positive and negative responses for review of systems for constitutional, eyes, ENT, cardiovascular, respiratory, gastrointestinal, neurological, , musculoskeletal, and integument systems and related systems to the presenting problem are either stated in the history of present illness or were not pertinent or were negative for the symptoms and/or complaints related to the presenting medical problem. Positives and pertinent negatives as per HPI. All others reviewed and are negative.       PMH:     Past Medical History:   Diagnosis Date    Abnormal Pap smear     Abnormal Pap smear of cervix     Abscess     Abscess     on legs in past, last on 10/1/16 no current abscess, no drainage    Anesthesia     pt wakes up \" freaking out \" per pt    Cancer [facility])     cervical    Cervical cancer ([facility] [address]     Gestational diabetes mellitus, antepartum 2012    no current issues, diet controlled    Hypotension        Past Surgical History:   Procedure Laterality Date     SECTION  2010    DILATION AND CURETTAGE      suction D&C    DILATION AND CURETTAGE OF UTERUS  10/2/2009    DILATION AND CURETTAGE OF UTERUS  2016    andrew novosure ablation    HYSTERECTOMY, TOTAL ABDOMINAL      [facility]    LEEP      OTHER SURGICAL HISTORY  2012    excision lower sinus tract abdomen    OTHER SURGICAL HISTORY  2012    [facility]    OTHER SURGICAL HISTORY  2001    SICK 14 D. HOSPITALIZED AFTER 1ST BABY    OTHER SURGICAL HISTORY  10/18/2017    [facility]       Family History   Problem Relation Age of Onset    Arthritis Mother     High Blood Pressure Mother     Asthma Mother     Heart Disease Maternal Grandmother     Diabetes Paternal Grandmother     Heart Failure Paternal Grandmother        Medications:   No current outpatient medications on file. Allergies: Allergies   Allergen Reactions    Cephalexin      Yeast infection    Pcn [Penicillins] Swelling     Yeast infection         Social History:     Social History     Tobacco Use    Smoking status: Current Every Day Smoker     Packs/day: 0.50     Years: 15.00     Pack years: 7.50     Types: Cigarettes     Last attempt to quit: 11/15/2014     Years since quittin.7    Smokeless tobacco: Never Used   Substance Use Topics    Alcohol use: Yes     Alcohol/week: 0.0 standard drinks     Comment: rare    Drug use: No       Patient lives at home. Physical Exam:     Vitals:    20 1309   BP: 118/78   Pulse: 86   Resp: 18   Temp: 97.6 °F (36.4 °C)   SpO2: 99%   Weight: 218 lb (98.9 kg)   Height: 5' 2\" (1.575 m)       Exam:  Physical Exam  Nurse's notes and vital signs reviewed. The patient is not hypoxic. ? General: Alert, no acute distress, patient resting comfortably Patient is not toxic or lethargic. Skin: Warm, intact, no pallor noted. There is no evidence of rash at this time.   Head: Normocephalic, atraumatic  Eye: Normal conjunctiva  Ears, Nose, Throat: Right tympanic membrane erythematous and bulging, left tympanic membrane clear. No drainage or discharge noted. No pre- or post-auricular tenderness, erythema, or swelling noted. Nasal congestion rhinorrhea  Posterior oropharynx shows no erythema, tonsillar hypertrophy, or exudate. the uvula is midline. No trismus or drooling is noted. Moist mucous membranes. Neck: No anterior/posterior lymphadenopathy noted. No erythema, no masses, no fluctuance or induration noted. No meningeal signs. Cardiovascular: Regular Rate and Rhythm  Respiratory: No acute distress, no rhonchi, wheezing or crackles noted. No stridor or retractions are noted. Neurological: A&O x4, normal speech  Psychiatric: Cooperative         Testing:           Medical Decision Making:     The patient has been seen and evaluated. The vital signs have been reviewed. The patient does not appear to be toxic or lethargic. The patient is allergic to penicillin. The patient will be given doxycycline. The patient will be started on chlorphentermine and a Medrol Dosepak. The patient will also be given a Diflucan. The patient was educated on the proper dosage of motrin and tylenol and the appropriate intervals of each. The patient is to increase fluid intake over the next several days. The patient is to return to express care or go directly to the emergency department should any of the signs or symptoms worsen. The patient is to followup with primary care physician in 2-3 days for repeat evaluation. The patient has no other questions or concerns at this time the patient will be discharged home. Clinical Impression:   Louisa Menezes was seen today for otalgia. Diagnoses and all orders for this visit:    Right otitis media, unspecified otitis media type    Nasal congestion    Acute non-recurrent sinusitis, unspecified location    Tobacco abuse    Other orders  -     doxycycline hyclate (VIBRA-TABS) 100 MG tablet;  Take 1 tablet by mouth 2 times daily for 10 days  -     methylPREDNISolone (MEDROL DOSEPACK) 4 MG tablet; Take by mouth.  -     chlorpheniramine (ALLER-CHLOR) 4 MG tablet; Take 1 tablet by mouth every 6 hours as needed for Allergies  -     fluconazole (DIFLUCAN) 150 MG tablet; Take 1 now and then another at the end of the course of the antibiotic    Tobacco use can lead to tobacco/nicotine dependence and serious health problems. Quitting smoking greatly reduces the risk of developing smoking-related diseases. Lowered risk for lung cancer and many other types of cancer. Reduced risk for heart disease, stroke, and peripheral vascular disease (narrowing of the blood vessels outside your heart). Reduced heart disease risk within 1 to 2 years of quitting. Reduced respiratory symptoms, such as coughing, wheezing, and shortness of breath. While these symptoms may not disappear, they do not continue to progress at the same rate among people who quit compared with those who continue to smoke. Reduced risk of developing some lung diseases (such as chronic obstructive pulmonary disease, also known as COPD, one of the leading causes of death in the [place]). Tobacco/nicotine dependence is a condition that often requires repeated treatments, but there are helpful treatments and resources for quitting. The patient is to call for any concerns or return if any of the signs or symptoms worsen. The patient is to follow-up with PCP in the next 2-3 days for repeat evaluation repeat assessment or go directly to the emergency department.      SIGNATURE: Rocio Land III, PA-C

## 2020-08-10 ENCOUNTER — OFFICE VISIT (OUTPATIENT)
Dept: PRIMARY CARE CLINIC | Age: 40
End: 2020-08-10
Payer: MEDICAID

## 2020-08-10 ENCOUNTER — HOSPITAL ENCOUNTER (OUTPATIENT)
Age: 40
Discharge: HOME | End: 2020-08-12
Payer: MEDICAID

## 2020-08-10 VITALS
TEMPERATURE: 98.2 F | DIASTOLIC BLOOD PRESSURE: 82 MMHG | BODY MASS INDEX: 41.54 KG/M2 | SYSTOLIC BLOOD PRESSURE: 122 MMHG | HEIGHT: 61 IN | OXYGEN SATURATION: 97 % | HEART RATE: 62 BPM | WEIGHT: 220 LBS

## 2020-08-10 PROCEDURE — 4004F PT TOBACCO SCREEN RCVD TLK: CPT | Performed by: NURSE PRACTITIONER

## 2020-08-10 PROCEDURE — G8427 DOCREV CUR MEDS BY ELIG CLIN: HCPCS | Performed by: NURSE PRACTITIONER

## 2020-08-10 PROCEDURE — G8417 CALC BMI ABV UP PARAM F/U: HCPCS | Performed by: NURSE PRACTITIONER

## 2020-08-10 PROCEDURE — U0003 INFECTIOUS AGENT DETECTION BY NUCLEIC ACID (DNA OR RNA); SEVERE ACUTE RESPIRATORY SYNDROME CORONAVIRUS 2 (SARS-COV-2) (CORONAVIRUS DISEASE [COVID-19]), AMPLIFIED PROBE TECHNIQUE, MAKING USE OF HIGH THROUGHPUT TECHNOLOGIES AS DESCRIBED BY CMS-2020-01-R: HCPCS

## 2020-08-10 PROCEDURE — 99213 OFFICE O/P EST LOW 20 MIN: CPT | Performed by: NURSE PRACTITIONER

## 2020-08-10 RX ORDER — BROMPHENIRAMINE MALEATE, PSEUDOEPHEDRINE HYDROCHLORIDE, AND DEXTROMETHORPHAN HYDROBROMIDE 2; 30; 10 MG/5ML; MG/5ML; MG/5ML
10 SYRUP ORAL 4 TIMES DAILY PRN
Qty: 240 ML | Refills: 0 | Status: SHIPPED
Start: 2020-08-10 | End: 2020-09-16

## 2020-08-10 RX ORDER — AZITHROMYCIN 250 MG/1
250 TABLET, FILM COATED ORAL DAILY
Qty: 6 TABLET | Refills: 0 | Status: SHIPPED
Start: 2020-08-10 | End: 2020-09-16

## 2020-08-10 NOTE — PROGRESS NOTES
Ashlee Beverly  1980    Chief Complaint   Patient presents with    Sinus Problem     x 7 days    Ear Fullness     right ear       Respiratory Symptoms:  Patient complains of 10 day(s) history of ear pain: bilaterally, nasal congestion, sore throat and non-productive cough. Symptoms have been worsening with time. She denies any other symptoms. She was seen at [facility] last week given doxycycline, chlorpheniramine, medrol dosepack and diflucan with minimal results. Relevant PMH: No pertinent PMH. Smoking history:  She  reports that she has been smoking cigarettes. She has a 7.50 pack-year smoking history. She has never used smokeless tobacco.     She has had no known ill contacts. Treatment to date: none. Travel screen completed:  Yes          Vitals:    08/10/20 1228   BP: 122/82   Pulse: 62   Temp: 98.2 °F (36.8 °C)   TempSrc: Oral   SpO2: 97%   Weight: 220 lb (99.8 kg)   Height: 5' 1\" (1.549 m)      Physical Exam  Vitals signs reviewed. Constitutional:       Appearance: Normal appearance. She is well-developed. She is not toxic-appearing. HENT:      Head: Normocephalic and atraumatic. Right Ear: Hearing, tympanic membrane, ear canal and external ear normal.      Left Ear: Hearing, tympanic membrane, ear canal and external ear normal.      Nose: Congestion and rhinorrhea present. Rhinorrhea is clear. Right Sinus: Maxillary sinus tenderness present. Left Sinus: Maxillary sinus tenderness present. Mouth/Throat:      Lips: Pink. Mouth: Mucous membranes are moist.      Pharynx: Oropharynx is clear. Uvula midline. Posterior oropharyngeal erythema present. No oropharyngeal exudate. Eyes:      General: Lids are normal.      Conjunctiva/sclera: Conjunctivae normal.      Pupils: Pupils are equal, round, and reactive to light. Neck:      Musculoskeletal: Normal range of motion.       Trachea: Trachea normal.   Cardiovascular:      Rate and Rhythm: Normal rate and regular rhythm. Pulses: Normal pulses. Heart sounds: Normal heart sounds. Pulmonary:      Effort: Pulmonary effort is normal.      Breath sounds: Normal breath sounds. Abdominal:      General: Abdomen is flat. Bowel sounds are normal.      Palpations: Abdomen is soft. Lymphadenopathy:      Cervical: No cervical adenopathy. Skin:     General: Skin is warm and dry. Capillary Refill: Capillary refill takes less than 2 seconds. Neurological:      Mental Status: She is alert and oriented to person, place, and time. Psychiatric:         Attention and Perception: Attention normal.         Mood and Affect: Mood and affect normal.         Speech: Speech normal.         Behavior: Behavior normal. Behavior is cooperative. Thought Content: Thought content normal.         Cognition and Memory: Cognition normal.         Judgment: Judgment normal.        Assessment/Plan:  1. Acute non-recurrent maxillary sinusitis  - brompheniramine-pseudoephedrine-DM (BROMFED DM) 2-30-10 MG/5ML syrup; Take 10 mLs by mouth 4 times daily as needed for Congestion or Cough  Dispense: 240 mL; Refill: 0  - azithromycin (ZITHROMAX Z-JAN) 250 MG tablet; Take 1 tablet by mouth daily Take 2 tabs on day one, then 1 tab daily for the next 4 days  Dispense: 6 tablet; Refill: 0    2. Suspected COVID-19 virus infection  - Covid-19 Ambulatory; Future  - advised patient of current guidelines for both positive and negative covid results. ULISES Cordon - CNP  8/10/20     This visit was provided as a focused evaluation during the COVID -19 pandemic/national emergency. A comprehensive review of all previous patient history and testing was not conducted. Pertinent findings were elicited during the visit.

## 2020-08-12 LAB
SARS-COV-2: NOT DETECTED
SOURCE: NORMAL

## 2020-09-16 ENCOUNTER — HOSPITAL ENCOUNTER (OUTPATIENT)
Age: 40
Discharge: HOME | End: 2020-09-18
Payer: MEDICAID

## 2020-09-16 ENCOUNTER — OFFICE VISIT (OUTPATIENT)
Dept: FAMILY MEDICINE CLINIC | Age: 40
End: 2020-09-16
Payer: MEDICAID

## 2020-09-16 VITALS — WEIGHT: 216 LBS | HEART RATE: 117 BPM | TEMPERATURE: 97.9 F | OXYGEN SATURATION: 96 % | BODY MASS INDEX: 40.81 KG/M2

## 2020-09-16 PROBLEM — E66.01 MORBIDLY OBESE (HCC): Status: ACTIVE | Noted: 2020-09-16

## 2020-09-16 PROCEDURE — 87070 CULTURE OTHR SPECIMN AEROBIC: CPT

## 2020-09-16 PROCEDURE — 10061 I&D ABSCESS COMP/MULTIPLE: CPT | Performed by: FAMILY MEDICINE

## 2020-09-16 PROCEDURE — 99213 OFFICE O/P EST LOW 20 MIN: CPT | Performed by: FAMILY MEDICINE

## 2020-09-16 PROCEDURE — 87205 SMEAR GRAM STAIN: CPT

## 2020-09-16 PROCEDURE — 4004F PT TOBACCO SCREEN RCVD TLK: CPT | Performed by: FAMILY MEDICINE

## 2020-09-16 PROCEDURE — G8427 DOCREV CUR MEDS BY ELIG CLIN: HCPCS | Performed by: FAMILY MEDICINE

## 2020-09-16 PROCEDURE — G8417 CALC BMI ABV UP PARAM F/U: HCPCS | Performed by: FAMILY MEDICINE

## 2020-09-16 RX ORDER — SULFAMETHOXAZOLE AND TRIMETHOPRIM 800; 160 MG/1; MG/1
1 TABLET ORAL 2 TIMES DAILY
Qty: 20 TABLET | Refills: 0 | Status: SHIPPED
Start: 2020-09-16 | End: 2021-02-15 | Stop reason: ALTCHOICE

## 2020-09-16 ASSESSMENT — ENCOUNTER SYMPTOMS
RESPIRATORY NEGATIVE: 1
COLOR CHANGE: 1

## 2020-09-16 NOTE — PROGRESS NOTES
20  Vivek Records : 1980 Sex: female  Age: 47 y.o. Chief Complaint   Patient presents with    Cyst     R post upper leg/ongoing        This patient is a 24-year-old white female with a large abscess of the right posterior upper thigh that measures about 5 cm in length and 3 cm in diameter. It is fluctuant and erythematous and painful to pressure in sitting and walking. She has a past medical history of having similar type of lesions. Review of Systems   Constitutional: Negative. Respiratory: Negative. Cardiovascular: Negative. Skin: Positive for color change and wound.          Current Outpatient Medications:     sulfamethoxazole-trimethoprim (BACTRIM DS) 800-160 MG per tablet, Take 1 tablet by mouth 2 times daily, Disp: 20 tablet, Rfl: 0    chlorpheniramine (ALLER-CHLOR) 4 MG tablet, Take 1 tablet by mouth every 6 hours as needed for Allergies, Disp: 20 tablet, Rfl: 0    fluconazole (DIFLUCAN) 150 MG tablet, Take 1 now and then another at the end of the course of the antibiotic, Disp: 2 tablet, Rfl: 0  Allergies   Allergen Reactions    Cephalexin      Yeast infection    Pcn [Penicillins] Swelling     Yeast infection         Past Medical History:   Diagnosis Date    Abnormal Pap smear     Abnormal Pap smear of cervix     Abscess     Abscess     on legs in past, last on 10/1/16 no current abscess, no drainage    Anesthesia     pt wakes up \" freaking out \" per pt    Cancer ([address]     cervical    Cervical cancer ([address]     Gestational diabetes mellitus, antepartum 2012    no current issues, diet controlled    Hypotension      Past Surgical History:   Procedure Laterality Date     SECTION  2010    DILATION AND CURETTAGE      suction D&C    DILATION AND CURETTAGE OF UTERUS  10/2/2009    DILATION AND CURETTAGE OF UTERUS  2016    andrew novosure ablation    HYSTERECTOMY, TOTAL ABDOMINAL      larh    LEEP      OTHER SURGICAL HISTORY  2012 excision lower sinus tract abdomen    OTHER SURGICAL HISTORY  2012    [facility]    OTHER SURGICAL HISTORY      SICK 14 D. HOSPITALIZED AFTER 1ST BABY    OTHER SURGICAL HISTORY  10/18/2017    [facility]     Family History   Problem Relation Age of Onset    Arthritis Mother     High Blood Pressure Mother     Asthma Mother     Heart Disease Maternal Grandmother     Diabetes Paternal Grandmother     Heart Failure Paternal Grandmother      Social History     Tobacco Use    Smoking status: Current Every Day Smoker     Packs/day: 0.50     Years: 15.00     Pack years: 7.50     Types: Cigarettes     Last attempt to quit: 11/15/2014     Years since quittin.8    Smokeless tobacco: Never Used   Substance Use Topics    Alcohol use: Yes     Alcohol/week: 0.0 standard drinks     Comment: rare    Drug use: No        Vitals:    20 1400   Pulse: 117   Temp: 97.9 °F (36.6 °C)   SpO2: 96%   Weight: 216 lb (98 kg)       Physical Exam  Vitals signs reviewed. Constitutional:       Appearance: She is obese. HENT:      Head: Normocephalic and atraumatic. Cardiovascular:      Rate and Rhythm: Normal rate and regular rhythm. Heart sounds: No murmur. Pulmonary:      Effort: Pulmonary effort is normal.      Breath sounds: Normal breath sounds. Skin:     Findings: Erythema and lesion present. Neurological:      Mental Status: She is alert. Assessment and Plan:  Ricky Hobson was seen today for cyst.    Diagnoses and all orders for this visit:    Abscess of tendon of right thigh  -     [id number] [facility] Garry YARBROUGH MD, General Surgery, [facility]    Morbidly obese [facility])    Other orders  -     sulfamethoxazole-trimethoprim (BACTRIM DS) 800-160 MG per tablet;  Take 1 tablet by mouth 2 times daily        Orders Placed This Encounter   Medications    sulfamethoxazole-trimethoprim (BACTRIM DS) 800-160 MG per tablet     Sig: Take 1 tablet by mouth 2 times daily Dispense:  20 tablet     Refill:  0        Patient advised to follow up with PCP as needed. Seen By:  Arabella Reinoso,   She has a large egg-like abscess in the posterior aspect of the right thigh just inferior to the gluteal fold. The area was prepped and draped and then an I&D was performed with a large amount of purulent material expressed. Area was then dressed she is to use Epson salt soaks twice daily or 3 times daily daily and we placed her on Bactrim DS 1 twice daily due to her multiple drug allergies. She is also been referred to Dr. Jazzy Darling for surgical consultation.

## 2020-09-19 LAB
GRAM STAIN RESULT: ABNORMAL
ORGANISM: ABNORMAL
ORGANISM: ABNORMAL
WOUND/ABSCESS: ABNORMAL
WOUND/ABSCESS: ABNORMAL

## 2020-10-08 ENCOUNTER — OFFICE VISIT (OUTPATIENT)
Dept: SURGERY | Age: 40
End: 2020-10-08
Payer: MEDICAID

## 2020-10-08 ENCOUNTER — HOSPITAL ENCOUNTER (OUTPATIENT)
Age: 40
Discharge: HOME | End: 2020-10-10
Payer: MEDICAID

## 2020-10-08 VITALS
TEMPERATURE: 99 F | RESPIRATION RATE: 16 BRPM | OXYGEN SATURATION: 98 % | BODY MASS INDEX: 39.84 KG/M2 | SYSTOLIC BLOOD PRESSURE: 134 MMHG | DIASTOLIC BLOOD PRESSURE: 80 MMHG | HEIGHT: 62 IN | HEART RATE: 67 BPM | WEIGHT: 216.5 LBS

## 2020-10-08 PROCEDURE — G8427 DOCREV CUR MEDS BY ELIG CLIN: HCPCS | Performed by: SURGERY

## 2020-10-08 PROCEDURE — 10061 I&D ABSCESS COMP/MULTIPLE: CPT | Performed by: SURGERY

## 2020-10-08 PROCEDURE — 87070 CULTURE OTHR SPECIMN AEROBIC: CPT

## 2020-10-08 PROCEDURE — 4004F PT TOBACCO SCREEN RCVD TLK: CPT | Performed by: SURGERY

## 2020-10-08 PROCEDURE — 87205 SMEAR GRAM STAIN: CPT

## 2020-10-08 PROCEDURE — G8484 FLU IMMUNIZE NO ADMIN: HCPCS | Performed by: SURGERY

## 2020-10-08 PROCEDURE — G8417 CALC BMI ABV UP PARAM F/U: HCPCS | Performed by: SURGERY

## 2020-10-08 PROCEDURE — 99203 OFFICE O/P NEW LOW 30 MIN: CPT | Performed by: SURGERY

## 2020-10-10 LAB
GRAM STAIN RESULT: NORMAL
WOUND/ABSCESS: NORMAL

## 2020-10-15 ENCOUNTER — OFFICE VISIT (OUTPATIENT)
Dept: SURGERY | Age: 40
End: 2020-10-15
Payer: MEDICAID

## 2020-10-15 VITALS
OXYGEN SATURATION: 99 % | WEIGHT: 217.5 LBS | RESPIRATION RATE: 16 BRPM | DIASTOLIC BLOOD PRESSURE: 80 MMHG | BODY MASS INDEX: 40.03 KG/M2 | HEIGHT: 62 IN | TEMPERATURE: 98.4 F | SYSTOLIC BLOOD PRESSURE: 130 MMHG | HEART RATE: 82 BPM

## 2020-10-15 PROCEDURE — 99213 OFFICE O/P EST LOW 20 MIN: CPT | Performed by: SURGERY

## 2020-10-15 PROCEDURE — G8484 FLU IMMUNIZE NO ADMIN: HCPCS | Performed by: SURGERY

## 2020-10-15 PROCEDURE — G8417 CALC BMI ABV UP PARAM F/U: HCPCS | Performed by: SURGERY

## 2020-10-15 PROCEDURE — 4004F PT TOBACCO SCREEN RCVD TLK: CPT | Performed by: SURGERY

## 2020-10-15 PROCEDURE — G8427 DOCREV CUR MEDS BY ELIG CLIN: HCPCS | Performed by: SURGERY

## 2020-10-16 NOTE — PROGRESS NOTES
accuracy; however, inadvertent computerized transcription errors may be present. Please excuse any transcriptional grammatical or spelling errors that may have escaped my editorial review.       CC: Nanette Conteh DO , Dr. Salgado Foil

## 2020-10-17 NOTE — PROGRESS NOTES
[address] [facility] Note    Assessment/Plan:      Diagnosis Orders   1. Recurrent abscess of posterior right thigh      Status post I&D in the office today. Antibiotics. Return in about 1 week (around 10/15/2020). Chief Complaint   Patient presents with    New Patient     ref by Dr. Jean Paul Cochran for abscess of tendon of right thigh. PCP: Gill Hills DO    HPI: Drea Erickson is a 47 y.o. female who presents in consultation for right posterior thigh abscess. She has had abscesses before. She says usually she can manage with them at home but occasionally they get very enlarged and need to be drained. She did have drainage in urgent care the other day. She says it has come back and it is significantly painful. She is having trouble sitting down because of the discomfort. Past Medical History:   Diagnosis Date    Abnormal Pap smear     Abnormal Pap smear of cervix     Abscess     Abscess     on legs in past, last on 10/1/16 no current abscess, no drainage    Anesthesia     pt wakes up \" freaking out \" per pt    Cancer ([address]     cervical    Cervical cancer ([address]     Gestational diabetes mellitus, antepartum 2012    no current issues, diet controlled    Hypotension        Past Surgical History:   Procedure Laterality Date     SECTION  2010    DILATION AND CURETTAGE      suction D&C    DILATION AND CURETTAGE OF UTERUS  10/2/2009    DILATION AND CURETTAGE OF UTERUS  2016    andrew novosure ablation    HYSTERECTOMY, TOTAL ABDOMINAL      larh    LEEP      OTHER SURGICAL HISTORY  2012    excision lower sinus tract abdomen    OTHER SURGICAL HISTORY  2012    [facility]    OTHER SURGICAL HISTORY      SICK 14 D. HOSPITALIZED AFTER 1ST BABY    OTHER SURGICAL HISTORY  10/18/2017    [facility]       Prior to Admission medications    Medication Sig Start Date End Date Taking?  Authorizing Provider   sulfamethoxazole-trimethoprim (BACTRIM DS) 800-160 MG per tablet Take 1 tablet by mouth 2 times daily  Patient not taking: Reported on 10/6/2020 9/16/20   Terell Platt DO   chlorpheniramine (ALLER-CHLOR) 4 MG tablet Take 1 tablet by mouth every 6 hours as needed for Allergies  Patient not taking: Reported on 10/6/2020 8/3/20   AFIA Gloria III   fluconazole (DIFLUCAN) 150 MG tablet Take 1 now and then another at the end of the course of the antibiotic  Patient not taking: Reported on 10/6/2020 8/3/20   Dominiqueeca Pair III, PA       Allergies   Allergen Reactions    Cephalexin      Yeast infection    Pcn [Penicillins] Swelling     Yeast infection         Social History     Socioeconomic History    Marital status:      Spouse name: None    Number of children: None    Years of education: None    Highest education level: None   Occupational History    None   Social Needs    Financial resource strain: None    Food insecurity     Worry: None     Inability: None    Transportation needs     Medical: None     Non-medical: None   Tobacco Use    Smoking status: Current Every Day Smoker     Packs/day: 0.50     Years: 15.00     Pack years: 7.50     Types: Cigarettes     Last attempt to quit: 11/15/2014     Years since quittin.9    Smokeless tobacco: Never Used   Substance and Sexual Activity    Alcohol use:  Yes     Alcohol/week: 0.0 standard drinks     Comment: rare    Drug use: No    Sexual activity: Yes     Partners: Male     Birth control/protection: Surgical     Comment: ESSURE   Lifestyle    Physical activity     Days per week: None     Minutes per session: None    Stress: None   Relationships    Social connections     Talks on phone: None     Gets together: None     Attends [employer] service: None     Active member of club or organization: None     Attends meetings of clubs or organizations: None     Relationship status: None    Intimate partner violence     Fear of current or ex partner: None     Emotionally abused: None Physically abused: None     Forced sexual activity: None   Other Topics Concern    None   Social History Narrative    None       Family History   Problem Relation Age of Onset    Arthritis Mother     High Blood Pressure Mother     Asthma Mother     Heart Disease Maternal Grandmother     Diabetes Paternal Grandmother     Heart Failure Paternal Grandmother        Review of Systems   All other systems reviewed and are negative. Objective:  Vitals:    10/08/20 1630   BP: 134/80   Site: Left Upper Arm   Position: Sitting   Cuff Size: Medium Adult   Pulse: 67   Resp: 16   Temp: 99 °F (37.2 °C)   TempSrc: Temporal   SpO2: 98%   Weight: 216 lb 8 oz (98.2 kg)   Height: 5' 2\" (1.575 m)          Physical Exam  HENT:      Head: Normocephalic and atraumatic. Eyes:      General:         Right eye: No discharge. Left eye: No discharge. Neck:      Trachea: No tracheal deviation. Cardiovascular:      Rate and Rhythm: Normal rate. Pulmonary:      Effort: Pulmonary effort is normal. No respiratory distress. Abdominal:      General: There is no distension. Palpations: Abdomen is soft. Tenderness: There is no abdominal tenderness. There is no guarding or rebound. Skin:     General: Skin is warm and dry. Comments: On the posterior right thigh is approximately 3 cm tense fluctuant fluid collection with surrounding erythema. It is tender. Neurological:      Mental Status: She is alert and oriented to person, place, and time. Office Procedure:    Diagnosis: Abscess    Location: Right posterior thigh    Surgeon: Mariam Velazquez MD    Specimen: Wound culture    Procedure:  After informed consent, the area was prepped in sterile fashion. 1% lidocaine with epinephrine was infiltrated around the lesion. Cruciate incision was made a large amount of purulent material was expressed. The wound was probed to break up any loculations. Wound was then irrigated.   It was then packed

## 2021-02-15 ENCOUNTER — OFFICE VISIT (OUTPATIENT)
Dept: FAMILY MEDICINE CLINIC | Age: 41
End: 2021-02-15
Payer: MEDICAID

## 2021-02-15 VITALS
SYSTOLIC BLOOD PRESSURE: 130 MMHG | RESPIRATION RATE: 18 BRPM | OXYGEN SATURATION: 99 % | TEMPERATURE: 97.4 F | DIASTOLIC BLOOD PRESSURE: 80 MMHG | HEART RATE: 94 BPM | WEIGHT: 219 LBS | HEIGHT: 62 IN | BODY MASS INDEX: 40.3 KG/M2

## 2021-02-15 DIAGNOSIS — Z72.0 TOBACCO ABUSE: ICD-10-CM

## 2021-02-15 DIAGNOSIS — J01.90 ACUTE NON-RECURRENT SINUSITIS, UNSPECIFIED LOCATION: ICD-10-CM

## 2021-02-15 DIAGNOSIS — H66.91 RIGHT OTITIS MEDIA, UNSPECIFIED OTITIS MEDIA TYPE: Primary | ICD-10-CM

## 2021-02-15 PROCEDURE — G8427 DOCREV CUR MEDS BY ELIG CLIN: HCPCS | Performed by: PHYSICIAN ASSISTANT

## 2021-02-15 PROCEDURE — 99214 OFFICE O/P EST MOD 30 MIN: CPT | Performed by: PHYSICIAN ASSISTANT

## 2021-02-15 PROCEDURE — G8417 CALC BMI ABV UP PARAM F/U: HCPCS | Performed by: PHYSICIAN ASSISTANT

## 2021-02-15 PROCEDURE — 96372 THER/PROPH/DIAG INJ SC/IM: CPT | Performed by: PHYSICIAN ASSISTANT

## 2021-02-15 PROCEDURE — 4004F PT TOBACCO SCREEN RCVD TLK: CPT | Performed by: PHYSICIAN ASSISTANT

## 2021-02-15 PROCEDURE — G8484 FLU IMMUNIZE NO ADMIN: HCPCS | Performed by: PHYSICIAN ASSISTANT

## 2021-02-15 RX ORDER — PREDNISONE 10 MG/1
TABLET ORAL
Qty: 18 TABLET | Refills: 0 | Status: SHIPPED
Start: 2021-02-15 | End: 2021-03-15 | Stop reason: ALTCHOICE

## 2021-02-15 RX ORDER — METHYLPREDNISOLONE ACETATE 80 MG/ML
60 INJECTION, SUSPENSION INTRA-ARTICULAR; INTRALESIONAL; INTRAMUSCULAR; SOFT TISSUE ONCE
Status: COMPLETED | OUTPATIENT
Start: 2021-02-15 | End: 2021-02-15

## 2021-02-15 RX ORDER — DOXYCYCLINE HYCLATE 100 MG
100 TABLET ORAL 2 TIMES DAILY
Qty: 20 TABLET | Refills: 0 | Status: SHIPPED
Start: 2021-02-15 | End: 2021-02-21 | Stop reason: ALTCHOICE

## 2021-02-15 RX ORDER — FLUCONAZOLE 150 MG/1
TABLET ORAL
Qty: 2 TABLET | Refills: 0 | Status: SHIPPED
Start: 2021-02-15 | End: 2021-03-15 | Stop reason: ALTCHOICE

## 2021-02-15 RX ADMIN — METHYLPREDNISOLONE ACETATE 60 MG: 80 INJECTION, SUSPENSION INTRA-ARTICULAR; INTRALESIONAL; INTRAMUSCULAR; SOFT TISSUE at 08:57

## 2021-02-15 NOTE — PROGRESS NOTES
2/15/21  Mildred Bejarano : 1980 Sex: female  Age 47 y.o. Subjective:  Chief Complaint   Patient presents with   Arjun Mccray         HPI:   Mildred Bejarano , 47 y.o. female presents to [facility] care for evaluation of right ear pain. The patient has been having this right ear pain ongoing for the last several days. The patient actually started taking clindamycin at home. The patient had this leftover from a previous prescription. The patient is not having any drainage or discharge of the right ear. She does have recurrent ear infections. The patient without having any significant left ear pain. Has had a little bit of nasal congestion. No chest pain, shortness of breath, lightheadedness, dizziness. Patient denies any vomiting or diarrhea. No loss of smell or taste. ROS:   Unless otherwise stated in this report the patient's positive and negative responses for review of systems for constitutional, eyes, ENT, cardiovascular, respiratory, gastrointestinal, neurological, , musculoskeletal, and integument systems and related systems to the presenting problem are either stated in the history of present illness or were not pertinent or were negative for the symptoms and/or complaints related to the presenting medical problem. Positives and pertinent negatives as per HPI. All others reviewed and are negative.       PMH:     Past Medical History:   Diagnosis Date    Abnormal Pap smear     Abnormal Pap smear of cervix     Abscess     Abscess     on legs in past, last on 10/1/16 no current abscess, no drainage    Anesthesia     pt wakes up \" freaking out \" per pt    Cancer ([address]     cervical    Cervical cancer ([address]     Gestational diabetes mellitus, antepartum 2012    no current issues, diet controlled    Hypotension        Past Surgical History:   Procedure Laterality Date     SECTION  2010    DILATION AND CURETTAGE      suction D&C  DILATION AND CURETTAGE OF UTERUS  10/2/2009    DILATION AND CURETTAGE OF UTERUS  2016    andrew novosure ablation    HYSTERECTOMY, TOTAL ABDOMINAL      [facility]    LEEP      OTHER SURGICAL HISTORY  2012    excision lower sinus tract abdomen    OTHER SURGICAL HISTORY  2012    [facility]    OTHER SURGICAL HISTORY  2001    SICK 14 D. HOSPITALIZED AFTER 1ST BABY    OTHER SURGICAL HISTORY  10/18/2017    [facility]       Family History   Problem Relation Age of Onset    Arthritis Mother     High Blood Pressure Mother     Asthma Mother     Heart Disease Maternal Grandmother     Diabetes Paternal Grandmother     Heart Failure Paternal Grandmother        Medications:     Current Outpatient Medications:     doxycycline hyclate (VIBRA-TABS) 100 MG tablet, Take 1 tablet by mouth 2 times daily for 10 days, Disp: 20 tablet, Rfl: 0    predniSONE (DELTASONE) 10 MG tablet, 3 tabs once daily for 3 days, 2 tabs once daily for 3 days, 1 tab once daily for 3 days, Disp: 18 tablet, Rfl: 0    fluconazole (DIFLUCAN) 150 MG tablet, Take 1 now and then another at the end of the course of the antibiotic, Disp: 2 tablet, Rfl: 0    Allergies: Allergies   Allergen Reactions    Cephalexin      Yeast infection    Pcn [Penicillins] Swelling     Yeast infection         Social History:     Social History     Tobacco Use    Smoking status: Current Every Day Smoker     Packs/day: 0.50     Years: 15.00     Pack years: 7.50     Types: Cigarettes     Last attempt to quit: 11/15/2014     Years since quittin.2    Smokeless tobacco: Never Used   Substance Use Topics    Alcohol use: Yes     Alcohol/week: 0.0 standard drinks     Comment: rare    Drug use: No       Patient lives at home.     Physical Exam:     Vitals:    02/15/21 0846   BP: 130/80   Pulse: 94   Resp: 18   Temp: 97.4 °F (36.3 °C)   SpO2: 99%   Weight: 219 lb (99.3 kg)   Height: 5' 2\" (1.575 m)       Exam:  Physical Exam Nurse's notes and vital signs reviewed. The patient is not hypoxic. General: Alert, no acute distress, patient resting comfortably Patient is not toxic or lethargic. Skin: Warm, intact, no pallor noted. There is no evidence of rash at this time. Head: Normocephalic, atraumatic. Eye: Normal conjunctiva  Ears, Nose, Throat: Right tympanic membrane erythematous, multiple air-fluid levels, no loss of landmarks, left tympanic membrane clear. No drainage or discharge noted. No pre- or post-auricular tenderness, erythema, or swelling noted. Minimal congestion. No facial erythema. Posterior oropharynx shows no erythema, tonsillar hypertrophy, asymmetry or peritonsillar abscess and no evidence of exudate. the uvula is midline. No trismus or drooling is noted. Moist mucous membranes. Cardio: Regular Rate and Rhythm  Respiratory: No acute distress, no rhonchi, wheezing or crackles noted. No stridor or retractions are noted. Musculoskeletal: No obvious deformity, no edema, no calf tenderness. No erythema. Neurological: A&O x4, normal speech  Psychiatric: Cooperative         Testing:           Medical Decision Making:     Vital signs reviewed    Past medical history reviewed. Allergies reviewed. Medications reviewed. Patient on arrival does not appear to be in any apparent distress or discomfort. The patient has been seen and evaluated. The patient does not appear to be toxic or lethargic. The patient will be treated with intramuscular injection methylprednisone. The patient will be given doxycycline, prednisone, and she requested Diflucan. The patient was educated on the proper dosage of motrin and tylenol and the appropriate intervals of each. The patient is to increase fluid intake over the next several days. The patient is to use OTC decongestant as needed. The patient is to return to express care or go directly to the emergency department should any of the signs or symptoms worsen. The patient is to followup with primary care physician in 2-3 days for repeat evaluation. The patient has no other questions or concerns at this time the patient will be discharged home. Clinical Impression:   Reagan Sapp was seen today for otalgia. Diagnoses and all orders for this visit:    Right otitis media, unspecified otitis media type    Acute non-recurrent sinusitis, unspecified location    Tobacco abuse    Other orders  -     doxycycline hyclate (VIBRA-TABS) 100 MG tablet; Take 1 tablet by mouth 2 times daily for 10 days  -     predniSONE (DELTASONE) 10 MG tablet; 3 tabs once daily for 3 days, 2 tabs once daily for 3 days, 1 tab once daily for 3 days  -     methylPREDNISolone acetate (DEPO-MEDROL) injection 60 mg  -     fluconazole (DIFLUCAN) 150 MG tablet; Take 1 now and then another at the end of the course of the antibiotic        The patient is to call for any concerns or return if any of the signs or symptoms worsen. The patient is to follow-up with PCP in the next 2-3 days for repeat evaluation repeat assessment or go directly to the emergency department.      SIGNATURE: Susannah Kenny III, PA-C Tobacco use can lead to tobacco/nicotine dependence and serious health problems. Quitting smoking greatly reduces the risk of developing smoking-related diseases. Lowered risk for lung cancer and many other types of cancer. Reduced risk for heart disease, stroke, and peripheral vascular disease (narrowing of the blood vessels outside your heart). Reduced heart disease risk within 1 to 2 years of quitting. Reduced respiratory symptoms, such as coughing, wheezing, and shortness of breath. While these symptoms may not disappear, they do not continue to progress at the same rate among people who quit compared with those who continue to smoke. Reduced risk of developing some lung diseases (such as chronic obstructive pulmonary disease, also known as COPD, one of the leading causes of death in the [place]). Tobacco/nicotine dependence is a condition that often requires repeated treatments, but there are helpful treatments and resources for quitting.

## 2021-02-21 ENCOUNTER — HOSPITAL ENCOUNTER (EMERGENCY)
Age: 41
Discharge: HOME | End: 2021-02-21
Payer: MEDICAID

## 2021-02-21 VITALS
RESPIRATION RATE: 16 BRPM | OXYGEN SATURATION: 97 % | HEIGHT: 62 IN | HEART RATE: 83 BPM | WEIGHT: 215 LBS | TEMPERATURE: 98.5 F | SYSTOLIC BLOOD PRESSURE: 154 MMHG | BODY MASS INDEX: 39.56 KG/M2 | DIASTOLIC BLOOD PRESSURE: 83 MMHG

## 2021-02-21 DIAGNOSIS — H92.02 OTALGIA OF LEFT EAR: ICD-10-CM

## 2021-02-21 DIAGNOSIS — H66.90 ACUTE OTITIS MEDIA, UNSPECIFIED OTITIS MEDIA TYPE: Primary | ICD-10-CM

## 2021-02-21 PROCEDURE — 6370000000 HC RX 637 (ALT 250 FOR IP): Performed by: NURSE PRACTITIONER

## 2021-02-21 PROCEDURE — 99284 EMERGENCY DEPT VISIT MOD MDM: CPT

## 2021-02-21 RX ORDER — ACETAMINOPHEN 500 MG
1000 TABLET ORAL ONCE
Status: COMPLETED | OUTPATIENT
Start: 2021-02-21 | End: 2021-02-21

## 2021-02-21 RX ORDER — IBUPROFEN 800 MG/1
800 TABLET ORAL EVERY 6 HOURS PRN
Qty: 20 TABLET | Refills: 0 | Status: SHIPPED | OUTPATIENT
Start: 2021-02-21 | End: 2021-03-15 | Stop reason: ALTCHOICE

## 2021-02-21 RX ORDER — LORATADINE AND PSEUDOEPHEDRINE SULFATE 5; 120 MG/1; MG/1
1 TABLET, EXTENDED RELEASE ORAL 2 TIMES DAILY
Qty: 14 TABLET | Refills: 0 | Status: SHIPPED | OUTPATIENT
Start: 2021-02-21 | End: 2021-02-28

## 2021-02-21 RX ORDER — CEFDINIR 300 MG/1
300 CAPSULE ORAL ONCE
Status: COMPLETED | OUTPATIENT
Start: 2021-02-21 | End: 2021-02-21

## 2021-02-21 RX ORDER — CEFDINIR 300 MG/1
300 CAPSULE ORAL 2 TIMES DAILY
Qty: 20 CAPSULE | Refills: 0 | Status: SHIPPED | OUTPATIENT
Start: 2021-02-21 | End: 2021-03-03

## 2021-02-21 RX ADMIN — ACETAMINOPHEN 1000 MG: 500 TABLET ORAL at 14:02

## 2021-02-21 RX ADMIN — CEFDINIR 300 MG: 300 CAPSULE ORAL at 14:02

## 2021-02-21 NOTE — ED PROVIDER NOTES
02/21/21. Imaging: All Radiology results interpreted by Radiologist unless otherwise noted. No orders to display     ED Course / Medical Decision Making     Medications   cefdinir (OMNICEF) capsule 300 mg (300 mg Oral Given 2/21/21 1402)   acetaminophen (TYLENOL) tablet 1,000 mg (1,000 mg Oral Given 2/21/21 1402)        Re-examination:  2/21/21       Time: 1426 no allergic reaction. Consult(s):   None    Procedure(s):   none    MDM:   This is a 57-year-old female who presents today with left ear pain and was previously treated at an urgent care with doxycycline and steroids and continues to have pain. Patient reports taking 2 of her child's amoxicillin and is not allergic to penicillin and has no mastoid tenderness. She is afebrile, nontoxic in appearance, hemodynamically stable with no signs of trismus or enlarged cervical lymph nodes. Patient instructed to stop taking doxycycline and will prescribe Omnicef and first dose given in the ED with no reaction. Patient was found to have an acute otitis on examination and will additionally give her decongestants and Motrin for symptomatic relief. Patient advised on signs and symptoms warranting immediate return to the ED for reevaluation at any time. Patient advised to follow-up with her PCP also given referral to ENT for reevaluation. Plan of Care/Counseling:  I reviewed today's visit with the patient in addition to providing specific details for the plan of care and counseling regarding the diagnosis and prognosis. Questions are answered at this time and are agreeable with the plan. Assessment      1. Acute otitis media, unspecified otitis media type    2. Otalgia of left ear      Plan   Discharge home.   Patient condition is good    New Medications     Discharge Medication List as of 2/21/2021  2:25 PM      START taking these medications    Details   cefdinir (OMNICEF) 300 MG capsule Take 1 capsule by mouth 2 times daily for 10 days, Disp-20 capsule, R-0Print      ibuprofen (ADVIL;MOTRIN) 800 MG tablet Take 1 tablet by mouth every 6 hours as needed for Pain, Disp-20 tablet, R-0Print      loratadine-pseudoephedrine (CLARITIN-D 12 HOUR) 5-120 MG per extended release tablet Take 1 tablet by mouth 2 times daily for 7 days, Disp-14 tablet, R-0Print           Electronically signed by ULISES Mandujano CNP   DD: 2/21/21  **This report was transcribed using voice recognition software. Every effort was made to ensure accuracy; however, inadvertent computerized transcription errors may be present.   END OF ED PROVIDER NOTE     ULISES Mandujano CNP  02/22/21 0105

## 2021-03-15 ENCOUNTER — HOSPITAL ENCOUNTER (EMERGENCY)
Age: 41
Discharge: HOME | End: 2021-03-15
Payer: MEDICAID

## 2021-03-15 ENCOUNTER — APPOINTMENT (OUTPATIENT)
Dept: CT IMAGING | Age: 41
End: 2021-03-15
Payer: MEDICAID

## 2021-03-15 ENCOUNTER — OFFICE VISIT (OUTPATIENT)
Dept: FAMILY MEDICINE CLINIC | Age: 41
End: 2021-03-15
Payer: MEDICAID

## 2021-03-15 VITALS
RESPIRATION RATE: 18 BRPM | TEMPERATURE: 97.7 F | OXYGEN SATURATION: 98 % | HEART RATE: 97 BPM | HEIGHT: 62 IN | DIASTOLIC BLOOD PRESSURE: 80 MMHG | BODY MASS INDEX: 39.75 KG/M2 | WEIGHT: 216 LBS | SYSTOLIC BLOOD PRESSURE: 124 MMHG

## 2021-03-15 VITALS
DIASTOLIC BLOOD PRESSURE: 87 MMHG | HEIGHT: 62 IN | OXYGEN SATURATION: 98 % | TEMPERATURE: 97.4 F | WEIGHT: 215 LBS | BODY MASS INDEX: 39.56 KG/M2 | SYSTOLIC BLOOD PRESSURE: 138 MMHG | RESPIRATION RATE: 14 BRPM | HEART RATE: 88 BPM

## 2021-03-15 DIAGNOSIS — R10.31 RIGHT LOWER QUADRANT ABDOMINAL PAIN: Primary | ICD-10-CM

## 2021-03-15 DIAGNOSIS — R10.9 FLANK PAIN: ICD-10-CM

## 2021-03-15 DIAGNOSIS — R11.0 NAUSEA: ICD-10-CM

## 2021-03-15 DIAGNOSIS — K57.32 DIVERTICULITIS OF COLON: Primary | ICD-10-CM

## 2021-03-15 LAB
ALBUMIN SERPL-MCNC: 3.9 G/DL (ref 3.5–5.2)
ALP BLD-CCNC: 85 U/L (ref 35–104)
ALT SERPL-CCNC: 37 U/L (ref 0–32)
ANION GAP SERPL CALCULATED.3IONS-SCNC: 11 MMOL/L (ref 7–16)
AST SERPL-CCNC: 15 U/L (ref 0–31)
BASOPHILS ABSOLUTE: 0.04 E9/L (ref 0–0.2)
BASOPHILS RELATIVE PERCENT: 0.3 % (ref 0–2)
BILIRUB SERPL-MCNC: 0.5 MG/DL (ref 0–1.2)
BILIRUBIN URINE: NEGATIVE
BILIRUBIN, POC: NEGATIVE
BLOOD URINE, POC: NEGATIVE
BLOOD, URINE: NEGATIVE
BUN BLDV-MCNC: 8 MG/DL (ref 6–20)
CALCIUM SERPL-MCNC: 9.4 MG/DL (ref 8.6–10.2)
CHLORIDE BLD-SCNC: 105 MMOL/L (ref 98–107)
CLARITY, POC: CLEAR
CLARITY: CLEAR
CO2: 24 MMOL/L (ref 22–29)
COLOR, POC: YELLOW
COLOR: ABNORMAL
CREAT SERPL-MCNC: 0.6 MG/DL (ref 0.5–1)
EOSINOPHILS ABSOLUTE: 0.19 E9/L (ref 0.05–0.5)
EOSINOPHILS RELATIVE PERCENT: 1.7 % (ref 0–6)
GFR AFRICAN AMERICAN: >60
GFR NON-AFRICAN AMERICAN: >60 ML/MIN/1.73
GLUCOSE BLD-MCNC: 98 MG/DL (ref 74–99)
GLUCOSE URINE, POC: NEGATIVE
GLUCOSE URINE: NEGATIVE MG/DL
HCT VFR BLD CALC: 46.7 % (ref 34–48)
HEMOGLOBIN: 15.7 G/DL (ref 11.5–15.5)
IMMATURE GRANULOCYTES #: 0.04 E9/L
IMMATURE GRANULOCYTES %: 0.3 % (ref 0–5)
KETONES, POC: NEGATIVE
KETONES, URINE: NEGATIVE MG/DL
LACTIC ACID: 1.4 MMOL/L (ref 0.5–2.2)
LEUKOCYTE EST, POC: NEGATIVE
LEUKOCYTE ESTERASE, URINE: NEGATIVE
LIPASE: 26 U/L (ref 13–60)
LYMPHOCYTES ABSOLUTE: 2.26 E9/L (ref 1.5–4)
LYMPHOCYTES RELATIVE PERCENT: 19.7 % (ref 20–42)
MCH RBC QN AUTO: 30.5 PG (ref 26–35)
MCHC RBC AUTO-ENTMCNC: 33.6 % (ref 32–34.5)
MCV RBC AUTO: 90.7 FL (ref 80–99.9)
MONOCYTES ABSOLUTE: 0.71 E9/L (ref 0.1–0.95)
MONOCYTES RELATIVE PERCENT: 6.2 % (ref 2–12)
NEUTROPHILS ABSOLUTE: 8.21 E9/L (ref 1.8–7.3)
NEUTROPHILS RELATIVE PERCENT: 71.8 % (ref 43–80)
NITRITE, POC: NEGATIVE
NITRITE, URINE: NEGATIVE
PDW BLD-RTO: 13.2 FL (ref 11.5–15)
PH UA: 5 (ref 5–9)
PH, POC: 5.5
PLATELET # BLD: 225 E9/L (ref 130–450)
PMV BLD AUTO: 11.6 FL (ref 7–12)
POTASSIUM SERPL-SCNC: 4.2 MMOL/L (ref 3.5–5)
PROTEIN UA: NEGATIVE MG/DL
PROTEIN, POC: NEGATIVE
RBC # BLD: 5.15 E12/L (ref 3.5–5.5)
SODIUM BLD-SCNC: 140 MMOL/L (ref 132–146)
SPECIFIC GRAVITY UA: >=1.03 (ref 1–1.03)
SPECIFIC GRAVITY, POC: >=1.03
TOTAL PROTEIN: 7.4 G/DL (ref 6.4–8.3)
UROBILINOGEN, POC: 0.2
UROBILINOGEN, URINE: 0.2 E.U./DL
WBC # BLD: 11.5 E9/L (ref 4.5–11.5)

## 2021-03-15 PROCEDURE — 83690 ASSAY OF LIPASE: CPT

## 2021-03-15 PROCEDURE — 81002 URINALYSIS NONAUTO W/O SCOPE: CPT | Performed by: PHYSICIAN ASSISTANT

## 2021-03-15 PROCEDURE — G8427 DOCREV CUR MEDS BY ELIG CLIN: HCPCS | Performed by: PHYSICIAN ASSISTANT

## 2021-03-15 PROCEDURE — 81003 URINALYSIS AUTO W/O SCOPE: CPT

## 2021-03-15 PROCEDURE — 74177 CT ABD & PELVIS W/CONTRAST: CPT

## 2021-03-15 PROCEDURE — G8484 FLU IMMUNIZE NO ADMIN: HCPCS | Performed by: PHYSICIAN ASSISTANT

## 2021-03-15 PROCEDURE — 99283 EMERGENCY DEPT VISIT LOW MDM: CPT

## 2021-03-15 PROCEDURE — G8417 CALC BMI ABV UP PARAM F/U: HCPCS | Performed by: PHYSICIAN ASSISTANT

## 2021-03-15 PROCEDURE — 2580000003 HC RX 258: Performed by: NURSE PRACTITIONER

## 2021-03-15 PROCEDURE — 80053 COMPREHEN METABOLIC PANEL: CPT

## 2021-03-15 PROCEDURE — 83605 ASSAY OF LACTIC ACID: CPT

## 2021-03-15 PROCEDURE — 4004F PT TOBACCO SCREEN RCVD TLK: CPT | Performed by: PHYSICIAN ASSISTANT

## 2021-03-15 PROCEDURE — 6360000004 HC RX CONTRAST MEDICATION: Performed by: RADIOLOGY

## 2021-03-15 PROCEDURE — 99214 OFFICE O/P EST MOD 30 MIN: CPT | Performed by: PHYSICIAN ASSISTANT

## 2021-03-15 PROCEDURE — 85025 COMPLETE CBC W/AUTO DIFF WBC: CPT

## 2021-03-15 RX ORDER — ONDANSETRON 4 MG/1
4 TABLET, ORALLY DISINTEGRATING ORAL EVERY 8 HOURS PRN
Qty: 10 TABLET | Refills: 0 | Status: SHIPPED | OUTPATIENT
Start: 2021-03-15 | End: 2021-09-16

## 2021-03-15 RX ORDER — 0.9 % SODIUM CHLORIDE 0.9 %
1000 INTRAVENOUS SOLUTION INTRAVENOUS ONCE
Status: COMPLETED | OUTPATIENT
Start: 2021-03-15 | End: 2021-03-15

## 2021-03-15 RX ORDER — AMOXICILLIN AND CLAVULANATE POTASSIUM 875; 125 MG/1; MG/1
1 TABLET, FILM COATED ORAL 2 TIMES DAILY
Qty: 20 TABLET | Refills: 0 | Status: SHIPPED | OUTPATIENT
Start: 2021-03-15 | End: 2021-03-25

## 2021-03-15 RX ORDER — FLUCONAZOLE 150 MG/1
150 TABLET ORAL ONCE
Qty: 1 TABLET | Refills: 0 | Status: SHIPPED | OUTPATIENT
Start: 2021-03-15 | End: 2021-03-15

## 2021-03-15 RX ORDER — METRONIDAZOLE 500 MG/1
500 TABLET ORAL 2 TIMES DAILY
Qty: 14 TABLET | Refills: 0 | Status: SHIPPED | OUTPATIENT
Start: 2021-03-15 | End: 2021-03-22

## 2021-03-15 RX ORDER — NAPROXEN 500 MG/1
500 TABLET ORAL 2 TIMES DAILY PRN
Qty: 28 TABLET | Refills: 0 | Status: SHIPPED | OUTPATIENT
Start: 2021-03-15 | End: 2021-09-16

## 2021-03-15 RX ADMIN — IOPAMIDOL 75 ML: 755 INJECTION, SOLUTION INTRAVENOUS at 14:09

## 2021-03-15 RX ADMIN — SODIUM CHLORIDE 1000 ML: 9 INJECTION, SOLUTION INTRAVENOUS at 13:19

## 2021-03-15 ASSESSMENT — PAIN SCALES - GENERAL: PAINLEVEL_OUTOF10: 7

## 2021-03-15 ASSESSMENT — PAIN DESCRIPTION - PAIN TYPE: TYPE: ACUTE PAIN

## 2021-03-15 NOTE — ED NOTES
Name: Imelda Buck  : 1980  MRN: [id number]    Date: 3/15/2021    Benefits of immediately proceeding with Radiology exam outweigh the risks and therefore the following is being waived:      [x] Pregnancy test    [] Protocol for Iodine allergy    [] MRI questionnaire    [] BUN/Creatinine        Rogelio George, APRN - [id number] [facility]  03/15/21 7399

## 2021-03-15 NOTE — ED PROVIDER NOTES
[facility]     HPI:  3/15/21,   Time: 12:37 PM EDT         Ninfa Mccurdy is a 47 y.o. female presenting to the ED for abdominal pain, beginning 2 days ago. The complaint has been persistent, moderate in severity, and worsened by nothing. Patient presents with complaints of right lower quadrant abdominal pain which she states began 2 days ago. States she was seen at the urgent care earlier today for the same complaint and was advised to come here for further evaluation and possibly a CAT scan. She does report a past surgical history of a hysterectomy however both ovaries were left in place. She does report a history of ovarian cyst.  She denies any dysuria. Denies any vaginal discharge. Denies any nausea, vomiting, diarrhea, chest pain or shortness of breath currently. States that she occasionally has pain radiating into the right upper quadrant and into the right CVA area. Denies any history of kidney stones. Pain medication was offered however declined at this time. ROS:   Pertinent positives and negatives are stated within HPI, all other systems reviewed and are negative.  --------------------------------------------- PAST HISTORY ---------------------------------------------  Past Medical History:  has a past medical history of Abnormal Pap smear, Abnormal Pap smear of cervix, Abscess, Abscess, Anesthesia, Cancer ([facility] [address] Cervical cancer ([facility] [address] Gestational diabetes mellitus, antepartum, and Hypotension. Past Surgical History:  has a past surgical history that includes LEEP; Dilation and curettage of uterus (10/2/2009);  section (2010); other surgical history (2012); Dilation & curettage; other surgical history (2012); other surgical history (); Dilation and curettage of uterus (2016); other surgical history (10/18/2017); and Hysterectomy, total abdominal.    Social History:  reports that she has been smoking cigarettes.  She has a 7.50 pack-year smoking history. She has never used smokeless tobacco. She reports current alcohol use. She reports that she does not use drugs. Family History: family history includes Arthritis in her mother; Asthma in her mother; Diabetes in her paternal grandmother; Heart Disease in her maternal grandmother; Heart Failure in her paternal grandmother; High Blood Pressure in her mother. The patients home medications have been reviewed.     Allergies: Cephalexin and Pcn [penicillins]    -------------------------------------------------- RESULTS -------------------------------------------------  All laboratory and radiology results have been personally reviewed by myself   LABS:  Results for orders placed or performed during the hospital encounter of 03/15/21   CBC Auto Differential   Result Value Ref Range    WBC 11.5 4.5 - 11.5 E9/L    RBC 5.15 3.50 - 5.50 E12/L    Hemoglobin 15.7 (H) 11.5 - 15.5 g/dL    Hematocrit 46.7 34.0 - 48.0 %    MCV 90.7 80.0 - 99.9 fL    MCH 30.5 26.0 - 35.0 pg    MCHC 33.6 32.0 - 34.5 %    RDW 13.2 11.5 - 15.0 fL    Platelets 957 133 - 483 E9/L    MPV 11.6 7.0 - 12.0 fL    Neutrophils % 71.8 43.0 - 80.0 %    Immature Granulocytes % 0.3 0.0 - 5.0 %    Lymphocytes % 19.7 (L) 20.0 - 42.0 %    Monocytes % 6.2 2.0 - 12.0 %    Eosinophils % 1.7 0.0 - 6.0 %    Basophils % 0.3 0.0 - 2.0 %    Neutrophils Absolute 8.21 (H) 1.80 - 7.30 E9/L    Immature Granulocytes # 0.04 E9/L    Lymphocytes Absolute 2.26 1.50 - 4.00 E9/L    Monocytes Absolute 0.71 0.10 - 0.95 E9/L    Eosinophils Absolute 0.19 0.05 - 0.50 E9/L    Basophils Absolute 0.04 0.00 - 0.20 E9/L   Comprehensive Metabolic Panel   Result Value Ref Range    Sodium 140 132 - 146 mmol/L    Potassium 4.2 3.5 - 5.0 mmol/L    Chloride 105 98 - 107 mmol/L    CO2 24 22 - 29 mmol/L    Anion Gap 11 7 - 16 mmol/L    Glucose 98 74 - 99 mg/dL    BUN 8 6 - 20 mg/dL    CREATININE 0.6 0.5 - 1.0 mg/dL    GFR Non-African American >60 >=60 mL/min/1.73    GFR African American >60 Calcium 9.4 8.6 - 10.2 mg/dL    Total Protein 7.4 6.4 - 8.3 g/dL    Albumin 3.9 3.5 - 5.2 g/dL    Total Bilirubin 0.5 0.0 - 1.2 mg/dL    Alkaline Phosphatase 85 35 - 104 U/L    ALT 37 (H) 0 - 32 U/L    AST 15 0 - 31 U/L   Lactic Acid, Plasma   Result Value Ref Range    Lactic Acid 1.4 0.5 - 2.2 mmol/L   Lipase   Result Value Ref Range    Lipase 26 13 - 60 U/L   Urinalysis   Result Value Ref Range    Color, UA DARK YELLOW (A) Straw/Yellow    Clarity, UA Clear Clear    Glucose, Ur Negative Negative mg/dL    Bilirubin Urine Negative Negative    Ketones, Urine Negative Negative mg/dL    Specific Gravity, UA >=1.030 1.005 - 1.030    Blood, Urine Negative Negative    pH, UA 5.0 5.0 - 9.0    Protein, UA Negative Negative mg/dL    Urobilinogen, Urine 0.2 <2.0 E.U./dL    Nitrite, Urine Negative Negative    Leukocyte Esterase, Urine Negative Negative       RADIOLOGY:  Interpreted by Radiologist.  CT ABDOMEN PELVIS W IV CONTRAST Additional Contrast? None   Final Result   1. Mild acute diverticulitis in the ascending colon. 2. Cholelithiasis.             ------------------------- NURSING NOTES AND VITALS REVIEWED ---------------------------   The nursing notes within the ED encounter and vital signs as below have been reviewed. BP (!) 165/94   Pulse 107   Temp 97.4 °F (36.3 °C) (Temporal)   Resp 16   Ht 5' 2\" (1.575 m)   Wt 215 lb (97.5 kg)   LMP 08/31/2017 (Exact Date)   SpO2 98%   BMI 39.32 kg/m²   Oxygen Saturation Interpretation: Normal      ---------------------------------------------------PHYSICAL EXAM--------------------------------------      Constitutional/General: Alert and oriented x3, well appearing, non toxic in NAD  Head: NC/AT  Eyes: PERRL, EOMI  Mouth: Oropharynx clear, handling secretions, no trismus  Neck: Supple, full ROM, no meningeal signs  Pulmonary: Lungs clear to auscultation bilaterally, no wheezes, rales, or rhonchi.  Not in respiratory distress  Cardiovascular:  Regular rate and rhythm, no murmurs, gallops, or rubs. 2+ distal pulses  Abdomen: Soft, soft and round abdomen non distended, tender on palpation to the right lower quadrant and into the right CVA area. Extremities: Moves all extremities x 4. Warm and well perfused  Skin: warm and dry without rash  Neurologic: GCS 15,  Psych: Normal Affect      ------------------------------ ED COURSE/MEDICAL DECISION MAKING----------------------  Medications   0.9 % sodium chloride bolus (1,000 mLs Intravenous New Bag 3/15/21 1319)   iopamidol (ISOVUE-370) 76 % injection 75 mL (75 mLs Intravenous Given 3/15/21 1409)         Medical Decision Making:    Time: 5675  Re-evaluation. Patients symptoms show no change  Repeat physical examination is not changed, patient resting comfortably reclining in recliner at this time she was informed of normal lab work and CAT scan findings revealing a diverticulitis of the ascending colon. The appendix is normal.  She is afebrile. Discussed further dietary restrictions and will be started on antibiotics, pain medication antiemetics and advised to follow-up with primary care physician if any change or worsening symptoms advised to return back to the emergency room. Counseling: The emergency provider has spoken with the patient and discussed todays results, in addition to providing specific details for the plan of care and counseling regarding the diagnosis and prognosis. Questions are answered at this time and they are agreeable with the plan.      --------------------------------- IMPRESSION AND DISPOSITION ---------------------------------    IMPRESSION  1.  Diverticulitis of colon        DISPOSITION  Disposition: Discharge to home  Patient condition is good                 ULISES Bañuelos - CNP  03/15/21 6844

## 2021-06-14 ENCOUNTER — HOSPITAL ENCOUNTER (EMERGENCY)
Age: 41
Discharge: HOME | End: 2021-06-14
Attending: EMERGENCY MEDICINE
Payer: MEDICAID

## 2021-06-14 VITALS
OXYGEN SATURATION: 98 % | BODY MASS INDEX: 39.56 KG/M2 | TEMPERATURE: 98 F | SYSTOLIC BLOOD PRESSURE: 137 MMHG | HEIGHT: 62 IN | RESPIRATION RATE: 18 BRPM | WEIGHT: 215 LBS | DIASTOLIC BLOOD PRESSURE: 65 MMHG | HEART RATE: 88 BPM

## 2021-06-14 DIAGNOSIS — L02.31 ABSCESS OF RIGHT BUTTOCK: Primary | ICD-10-CM

## 2021-06-14 PROCEDURE — 10060 I&D ABSCESS SIMPLE/SINGLE: CPT

## 2021-06-14 PROCEDURE — 99285 EMERGENCY DEPT VISIT HI MDM: CPT

## 2021-06-14 PROCEDURE — 6370000000 HC RX 637 (ALT 250 FOR IP): Performed by: EMERGENCY MEDICINE

## 2021-06-14 RX ORDER — HYDROCODONE BITARTRATE AND ACETAMINOPHEN 5; 325 MG/1; MG/1
1 TABLET ORAL EVERY 6 HOURS PRN
Qty: 4 TABLET | Refills: 0 | Status: SHIPPED | OUTPATIENT
Start: 2021-06-14 | End: 2021-06-16

## 2021-06-14 RX ORDER — FLUCONAZOLE 150 MG/1
150 TABLET ORAL ONCE
Status: COMPLETED | OUTPATIENT
Start: 2021-06-14 | End: 2021-06-14

## 2021-06-14 RX ORDER — LIDOCAINE HYDROCHLORIDE AND EPINEPHRINE 10; 10 MG/ML; UG/ML
20 INJECTION, SOLUTION INFILTRATION; PERINEURAL ONCE
Status: DISCONTINUED | OUTPATIENT
Start: 2021-06-14 | End: 2021-06-14 | Stop reason: HOSPADM

## 2021-06-14 RX ADMIN — FLUCONAZOLE 150 MG: 150 TABLET ORAL at 20:01

## 2021-06-14 ASSESSMENT — ENCOUNTER SYMPTOMS
DIARRHEA: 0
SHORTNESS OF BREATH: 0
VOMITING: 0
SINUS PRESSURE: 0
COUGH: 0
NAUSEA: 0
EYE PAIN: 0
EYE DISCHARGE: 0
EYE REDNESS: 0
BACK PAIN: 0
ABDOMINAL DISTENTION: 0
WHEEZING: 0
SORE THROAT: 0

## 2021-06-14 ASSESSMENT — PAIN DESCRIPTION - FREQUENCY: FREQUENCY: CONTINUOUS

## 2021-06-14 ASSESSMENT — PAIN DESCRIPTION - LOCATION
LOCATION: BUTTOCKS
LOCATION: BUTTOCKS

## 2021-06-14 ASSESSMENT — PAIN DESCRIPTION - PAIN TYPE
TYPE: ACUTE PAIN
TYPE: ACUTE PAIN

## 2021-06-14 ASSESSMENT — PAIN DESCRIPTION - ORIENTATION: ORIENTATION: RIGHT

## 2021-06-14 ASSESSMENT — PAIN DESCRIPTION - DESCRIPTORS: DESCRIPTORS: ACHING

## 2021-06-14 ASSESSMENT — PAIN SCALES - GENERAL
PAINLEVEL_OUTOF10: 10
PAINLEVEL_OUTOF10: 8

## 2021-06-15 NOTE — ED NOTES
Dr. Sjai Luke drained abcess to buttock. This RN at bedside. Drained moderate amount of pus and  Blood. Wound dressed with 4x4's. Pt tolerated well. Pt understands how to care for wound and incision sites as well as s/sx to return to ER.      Barbra Coker RN  06/14/21 2009

## 2021-06-15 NOTE — ED PROVIDER NOTES
32-year-old female presents to the emergency department with abscess on her buttock. She states history of abscesses has had drainage done by Dr. Sarah Bah in the past.  She states no fevers just felt very out of it a couple days ago but then has had improvement since then. She states use of doxycycline which really has not helped or worsened. She states no fevers chills or other complaints. The history is provided by the patient. Abscess  Location:  Pelvis  Pelvic abscess location:  R buttock  Abscess quality: draining and fluctuance    Duration:  2 weeks  Progression:  Unchanged  Chronicity:  New  Relieved by:  Nothing  Worsened by:  Nothing  Ineffective treatments:  None tried  Associated symptoms: no fever, no headaches, no nausea and no vomiting    Risk factors: hx of MRSA and prior abscess         Review of Systems   Constitutional: Negative for chills and fever. HENT: Negative for ear pain, sinus pressure and sore throat. Eyes: Negative for pain, discharge and redness. Respiratory: Negative for cough, shortness of breath and wheezing. Cardiovascular: Negative for chest pain. Gastrointestinal: Negative for abdominal distention, diarrhea, nausea and vomiting. Genitourinary: Negative for dysuria and frequency. Musculoskeletal: Negative for arthralgias and back pain. Skin: Negative for rash and wound. Neurological: Negative for weakness and headaches. Hematological: Negative for adenopathy. All other systems reviewed and are negative. Physical Exam  Constitutional:       Appearance: Normal appearance. HENT:      Head: Normocephalic and atraumatic. Mouth/Throat:      Mouth: Mucous membranes are moist.   Eyes:      Extraocular Movements: Extraocular movements intact. Pupils: Pupils are equal, round, and reactive to light. Cardiovascular:      Rate and Rhythm: Normal rate and regular rhythm. Pulses: Normal pulses. Heart sounds: Normal heart sounds. Pulmonary:      Effort: Pulmonary effort is normal.      Breath sounds: Normal breath sounds. Abdominal:      General: Abdomen is flat. Bowel sounds are normal. There is no distension. Palpations: Abdomen is soft. Tenderness: There is no guarding. Musculoskeletal:         General: Normal range of motion. Skin:     Findings: Abscess present. Neurological:      General: No focal deficit present. Mental Status: She is alert and oriented to person, place, and time. Procedures   PROCEDURE  6/14/21       Time: 7:15PM    INCISION AND DRAINAGE  Risks, benefits and alternatives (for applicable procedures below) described. Performed By: Myself: Lianne Morales DO. Indication: Abscess  Informed consent obtained: The patient provided verbal consent for this procedure. .  Prep: The skin was cleansed with povidone iodine and draped in a sterile fashion. Local Anesthesia:  obtained with Lidocaine 1% with epinephrine. Incision: Soft tissue abscess of Buttocks was Incised by scalpal and moderate, purulent fluid was drained. A wound culture was not obtained. The wound  was not irrigated and was not packed with iodoform gauze. The wound was then covered with a sterile dressing. Patient tolerated the procedure well. MDM  Number of Diagnoses or Management Options  Abscess of right buttock  Diagnosis management comments: Patient seen and examined. Concern for abscess. Patient is had no fevers. She did have some strange symptoms couple days ago but they have resolved at this point. Abscesses I indeed with significant purulent drainage removal.  She is recommended to follow-up with general surgeon.   She is to continue the antibiotic she is on and discharged with outpatient follow-up.             --------------------------------------------- PAST HISTORY ---------------------------------------------  Past Medical History:  has a past medical history of Abnormal Pap smear, Abnormal Pap smear of cervix, Abscess, Abscess, Anesthesia, Cancer ([facility] [address] Cervical cancer ([facility] [address] Gestational diabetes mellitus, antepartum, and Hypotension. Past Surgical History:  has a past surgical history that includes LEEP; Dilation and curettage of uterus (10/2/2009);  section (2010); other surgical history (2012); Dilation & curettage; other surgical history (2012); other surgical history (); Dilation and curettage of uterus (2016); other surgical history (10/18/2017); and Hysterectomy, total abdominal.    Social History:  reports that she has been smoking cigarettes. She has a 15.00 pack-year smoking history. She has never used smokeless tobacco. She reports current alcohol use. She reports that she does not use drugs. Family History: family history includes Arthritis in her mother; Asthma in her mother; Diabetes in her paternal grandmother; Heart Disease in her maternal grandmother; Heart Failure in her paternal grandmother; High Blood Pressure in her mother. The patients home medications have been reviewed. Allergies: Cephalexin and Pcn [penicillins]    -------------------------------------------------- RESULTS -------------------------------------------------  Labs:  No results found for this visit on 21. Radiology:  No orders to display       ------------------------- NURSING NOTES AND VITALS REVIEWED ---------------------------  Date / Time Roomed:  2021  6:18 PM  ED Bed Assignment:      The nursing notes within the ED encounter and vital signs as below have been reviewed.    /65   Pulse 88   Temp 98 °F (36.7 °C) (Oral)   Resp 18   Ht 5' 2\" (1.575 m)   Wt 215 lb (97.5 kg)   LMP 2017 (Exact Date)   SpO2 98%   BMI 39.32 kg/m²   Oxygen Saturation Interpretation: Normal      ------------------------------------------ PROGRESS NOTES ------------------------------------------  I have spoken with the patient and discussed todays results, in addition to providing specific details for the plan of care and counseling regarding the diagnosis and prognosis. Their questions are answered at this time and they are agreeable with the plan. I discussed at length with them reasons for immediate return here for re evaluation. They will followup with their primary care physician by calling their office tomorrow. --------------------------------- ADDITIONAL PROVIDER NOTES ---------------------------------  At this time the patient is without objective evidence of an acute process requiring hospitalization or inpatient management. They have remained hemodynamically stable throughout their entire ED visit and are stable for discharge with outpatient follow-up. The plan has been discussed in detail and they are aware of the specific conditions for emergent return, as well as the importance of follow-up. Discharge Medication List as of 6/14/2021  7:51 PM      START taking these medications    Details   HYDROcodone-acetaminophen (NORCO) 5-325 MG per tablet Take 1 tablet by mouth every 6 hours as needed for Pain for up to 4 doses. Intended supply: 3 days. Take lowest dose possible to manage pain, Disp-4 tablet, R-0Print             Diagnosis:  1. Abscess of right buttock        Disposition:  Patient's disposition: Discharge to home  Patient's condition is stable.        Abby Lan DO  06/14/21 1409

## 2021-07-31 ENCOUNTER — OFFICE VISIT (OUTPATIENT)
Dept: FAMILY MEDICINE CLINIC | Age: 41
End: 2021-07-31
Payer: MEDICAID

## 2021-07-31 VITALS
HEART RATE: 103 BPM | SYSTOLIC BLOOD PRESSURE: 124 MMHG | OXYGEN SATURATION: 99 % | TEMPERATURE: 97.9 F | RESPIRATION RATE: 16 BRPM | BODY MASS INDEX: 41.04 KG/M2 | WEIGHT: 223 LBS | DIASTOLIC BLOOD PRESSURE: 80 MMHG | HEIGHT: 62 IN

## 2021-07-31 DIAGNOSIS — H65.193 ACUTE MEE (MIDDLE EAR EFFUSION), BILATERAL: ICD-10-CM

## 2021-07-31 DIAGNOSIS — R09.81 NASAL CONGESTION: ICD-10-CM

## 2021-07-31 DIAGNOSIS — J02.9 ACUTE PHARYNGITIS, UNSPECIFIED ETIOLOGY: ICD-10-CM

## 2021-07-31 DIAGNOSIS — R07.0 PAIN IN THROAT: Primary | ICD-10-CM

## 2021-07-31 DIAGNOSIS — J01.90 ACUTE NON-RECURRENT SINUSITIS, UNSPECIFIED LOCATION: ICD-10-CM

## 2021-07-31 DIAGNOSIS — H92.03 OTALGIA, BILATERAL: ICD-10-CM

## 2021-07-31 LAB — S PYO AG THROAT QL: NORMAL

## 2021-07-31 PROCEDURE — G8417 CALC BMI ABV UP PARAM F/U: HCPCS | Performed by: PHYSICIAN ASSISTANT

## 2021-07-31 PROCEDURE — 4004F PT TOBACCO SCREEN RCVD TLK: CPT | Performed by: PHYSICIAN ASSISTANT

## 2021-07-31 PROCEDURE — 99213 OFFICE O/P EST LOW 20 MIN: CPT | Performed by: PHYSICIAN ASSISTANT

## 2021-07-31 PROCEDURE — G8427 DOCREV CUR MEDS BY ELIG CLIN: HCPCS | Performed by: PHYSICIAN ASSISTANT

## 2021-07-31 PROCEDURE — 87880 STREP A ASSAY W/OPTIC: CPT | Performed by: PHYSICIAN ASSISTANT

## 2021-07-31 RX ORDER — AMOXICILLIN AND CLAVULANATE POTASSIUM 875; 125 MG/1; MG/1
1 TABLET, FILM COATED ORAL 2 TIMES DAILY
Qty: 20 TABLET | Refills: 0 | Status: SHIPPED | OUTPATIENT
Start: 2021-07-31 | End: 2021-08-10

## 2021-07-31 RX ORDER — PREDNISONE 10 MG/1
TABLET ORAL
Qty: 18 TABLET | Refills: 0 | Status: SHIPPED
Start: 2021-07-31 | End: 2021-09-16

## 2021-07-31 RX ORDER — FLUCONAZOLE 150 MG/1
TABLET ORAL
Qty: 2 TABLET | Refills: 0 | Status: SHIPPED
Start: 2021-07-31 | End: 2021-09-16

## 2021-07-31 NOTE — PROGRESS NOTES
21  Nicky Juarez : 1980 Sex: female  Age 47 y.o. Subjective:  Chief Complaint   Patient presents with    Pharyngitis     sore throat 3 days          HPI:   Nicky Juarez , 47 y.o. female presents to express care for evaluation of sore throat    HPI  51-year-old female with a history of abscess, cervical cancer, hypertension presents to express care for evaluation of sore throat, ear pain, slight congestion. The patient has had the symptoms ongoing for the last 2 to 3 days. Seems to be getting worse. The patient is really not noted any fever. No chest pain, shortness of breath, back pain, flank pain. Seems to be doing well otherwise. No significant sick contacts        ROS:   Unless otherwise stated in this report the patient's positive and negative responses for review of systems for constitutional, eyes, ENT, cardiovascular, respiratory, gastrointestinal, neurological, , musculoskeletal, and integument systems and related systems to the presenting problem are either stated in the history of present illness or were not pertinent or were negative for the symptoms and/or complaints related to the presenting medical problem. Positives and pertinent negatives as per HPI. All others reviewed and are negative.       PMH:     Past Medical History:   Diagnosis Date    Abnormal Pap smear     Abnormal Pap smear of cervix     Abscess     Abscess     on legs in past, last on 10/1/16 no current abscess, no drainage    Anesthesia     pt wakes up \" freaking out \" per pt    Cancer ([address]     cervical    Cervical cancer ([address]     Gestational diabetes mellitus, antepartum 2012    no current issues, diet controlled    Hypotension        Past Surgical History:   Procedure Laterality Date     SECTION  2010    DILATION AND CURETTAGE      suction D&C    DILATION AND CURETTAGE OF UTERUS  10/2/2009    DILATION AND CURETTAGE OF UTERUS  2016    andrew novosure ablation   Charissa Gaston HYSTERECTOMY, TOTAL ABDOMINAL      [facility]    LEEP      OTHER SURGICAL HISTORY  2012    excision lower sinus tract abdomen    OTHER SURGICAL HISTORY  2012    [facility]    OTHER SURGICAL HISTORY  2001    SICK 14 D. HOSPITALIZED AFTER 1ST BABY    OTHER SURGICAL HISTORY  10/18/2017    [facility]       Family History   Problem Relation Age of Onset    Arthritis Mother     High Blood Pressure Mother     Asthma Mother     Heart Disease Maternal Grandmother     Diabetes Paternal Grandmother     Heart Failure Paternal Grandmother        Medications:     Current Outpatient Medications:     ondansetron (ZOFRAN ODT) 4 MG disintegrating tablet, Take 1 tablet by mouth every 8 hours as needed for Nausea or Vomiting, Disp: 10 tablet, Rfl: 0    naproxen (NAPROSYN) 500 MG tablet, Take 1 tablet by mouth 2 times daily as needed for Pain, Disp: 28 tablet, Rfl: 0    Allergies: Allergies   Allergen Reactions    Cephalexin      Yeast infection    Pcn [Penicillins] Swelling     Yeast infection         Social History:     Social History     Tobacco Use    Smoking status: Current Every Day Smoker     Packs/day: 1.00     Years: 15.00     Pack years: 15.00     Types: Cigarettes     Last attempt to quit: 11/15/2014     Years since quittin.7    Smokeless tobacco: Never Used   Vaping Use    Vaping Use: Never used   Substance Use Topics    Alcohol use: Yes     Alcohol/week: 0.0 standard drinks     Comment: rare    Drug use: No       Patient lives at home. Physical Exam:     Vitals:    21 1105   BP: 124/80   Pulse: 103   Resp: 16   Temp: 97.9 °F (36.6 °C)   SpO2: 99%   Weight: 223 lb (101.2 kg)   Height: 5' 2\" (1.575 m)       Exam:  Physical Exam  Nurse's notes and vital signs reviewed. The patient is not hypoxic. ? General: Alert, no acute distress, patient resting comfortably Patient is not toxic or lethargic. Skin: Warm, intact, no pallor noted. There is no evidence of rash at this time.   Head: Normocephalic, atraumatic  Eye: Normal conjunctiva  Ears, Nose, Throat: Right tympanic membrane minimal erythema, left tympanic membrane minimal erythema. No drainage or discharge noted. No pre- or post-auricular tenderness, erythema, or swelling noted. Slight congestion, rhinorrhea  Posterior oropharynx shows erythema and cobblestoning but no evidence of tonsillar hypertrophy, or exudate. the uvula is midline. No trismus or drooling is noted. Moist mucous membranes. Cardiovascular: Regular Rate and Rhythm  Respiratory: No acute distress, no rhonchi, wheezing or crackles noted. No stridor or retractions are noted. Neurological: A&O x4, normal speech  Psychiatric: Cooperative         Testing:     Results for orders placed or performed in visit on 07/31/21   POCT rapid strep A   Result Value Ref Range    Strep A Ag None Detected None Detected           Medical Decision Making:     Vital signs reviewed    Past medical history reviewed. Allergies reviewed. Medications reviewed. Patient on arrival does not appear to be in any apparent distress or discomfort. The patient has been seen and evaluated. The patient does not appear to be toxic or lethargic. The patient does have evidence of some erythema noted to both of the tympanic membranes with some air-fluid levels. The patient will be treated with Augmentin and prednisone. The patient's rapid strep was negative. The patient will also be given Diflucan. The patient is to return to express care or go directly to the emergency department should any of the signs or symptoms worsen. The patient is to followup with primary care physician in 2-3 days for repeat evaluation. The patient has no other questions or concerns at this time the patient will be discharged home. Clinical Impression:   Shyla Elena was seen today for pharyngitis.     Diagnoses and all orders for this visit:    Pain in throat  -     POCT rapid strep A    Acute non-recurrent

## 2021-09-16 ENCOUNTER — OFFICE VISIT (OUTPATIENT)
Dept: FAMILY MEDICINE CLINIC | Age: 41
End: 2021-09-16
Payer: MEDICAID

## 2021-09-16 VITALS
SYSTOLIC BLOOD PRESSURE: 130 MMHG | WEIGHT: 221 LBS | TEMPERATURE: 97.6 F | DIASTOLIC BLOOD PRESSURE: 84 MMHG | OXYGEN SATURATION: 99 % | HEIGHT: 62 IN | BODY MASS INDEX: 40.67 KG/M2 | HEART RATE: 94 BPM | RESPIRATION RATE: 18 BRPM

## 2021-09-16 DIAGNOSIS — Z20.822 EXPOSURE TO COVID-19 VIRUS: ICD-10-CM

## 2021-09-16 DIAGNOSIS — R05.9 COUGH: Primary | ICD-10-CM

## 2021-09-16 DIAGNOSIS — J06.9 ACUTE UPPER RESPIRATORY INFECTION, UNSPECIFIED: ICD-10-CM

## 2021-09-16 DIAGNOSIS — Z20.822 SUSPECTED COVID-19 VIRUS INFECTION: ICD-10-CM

## 2021-09-16 DIAGNOSIS — R09.81 NASAL CONGESTION: ICD-10-CM

## 2021-09-16 DIAGNOSIS — R09.82 POSTNASAL DRIP: ICD-10-CM

## 2021-09-16 PROCEDURE — 99213 OFFICE O/P EST LOW 20 MIN: CPT | Performed by: PHYSICIAN ASSISTANT

## 2021-09-16 PROCEDURE — 4004F PT TOBACCO SCREEN RCVD TLK: CPT | Performed by: PHYSICIAN ASSISTANT

## 2021-09-16 PROCEDURE — G8427 DOCREV CUR MEDS BY ELIG CLIN: HCPCS | Performed by: PHYSICIAN ASSISTANT

## 2021-09-16 PROCEDURE — G8417 CALC BMI ABV UP PARAM F/U: HCPCS | Performed by: PHYSICIAN ASSISTANT

## 2021-09-16 RX ORDER — BENZONATATE 100 MG/1
100 CAPSULE ORAL 3 TIMES DAILY PRN
Qty: 21 CAPSULE | Refills: 0 | Status: SHIPPED | OUTPATIENT
Start: 2021-09-16 | End: 2021-09-23

## 2021-09-16 RX ORDER — ALBUTEROL SULFATE 90 UG/1
2 AEROSOL, METERED RESPIRATORY (INHALATION) 4 TIMES DAILY PRN
Qty: 18 G | Refills: 0 | Status: SHIPPED
Start: 2021-09-16 | End: 2021-10-13

## 2021-09-16 RX ORDER — AZITHROMYCIN 250 MG/1
250 TABLET, FILM COATED ORAL SEE ADMIN INSTRUCTIONS
Qty: 6 TABLET | Refills: 0 | Status: SHIPPED | OUTPATIENT
Start: 2021-09-16 | End: 2021-09-21

## 2021-09-16 RX ORDER — PREDNISONE 20 MG/1
40 TABLET ORAL DAILY
Qty: 10 TABLET | Refills: 0 | Status: SHIPPED | OUTPATIENT
Start: 2021-09-16 | End: 2021-09-21

## 2021-09-16 NOTE — PROGRESS NOTES
21  Yung Lunsford : 1980 Sex: female  Age 46 y.o. Subjective:  Chief Complaint   Patient presents with    Cough     x4 days, sore throat    Drainage         HPI:   Yung Lunsford , 46 y.o. female presents to [facility] care for evaluation of cough, congestion, drainage, sore throat    HPI  44-year-old female presents to [facility] for evaluation cough, congestion, sore throat. The patient said the symptoms ongoing for about for 5 days now. On a Tuesday. The patient started with some congestion. The patient started to have some increased drainage that is now turned into a green color. The patient is been sneezing more. The patient has had a cough. The patient was recently out of town for a .  They were exposed to family member who had tested positive for COVID-19. The patient has not previously had Covid. The patient is vaccinated. She is here with her son who needs tested for return to work. ROS:   Unless otherwise stated in this report the patient's positive and negative responses for review of systems for constitutional, eyes, ENT, cardiovascular, respiratory, gastrointestinal, neurological, , musculoskeletal, and integument systems and related systems to the presenting problem are either stated in the history of present illness or were not pertinent or were negative for the symptoms and/or complaints related to the presenting medical problem. Positives and pertinent negatives as per HPI. All others reviewed and are negative.       PMH:     Past Medical History:   Diagnosis Date    Abnormal Pap smear     Abnormal Pap smear of cervix     Abscess     Abscess     on legs in past, last on 10/1/16 no current abscess, no drainage    Anesthesia     pt wakes up \" freaking out \" per pt    Cancer [facility])     cervical    Cervical cancer ([facility] [address]     Gestational diabetes mellitus, antepartum 2012    no current issues, diet controlled    Hypotension        Past Surgical History:   Procedure Laterality Date     SECTION  2010    DILATION AND CURETTAGE      suction D&C    DILATION AND CURETTAGE OF UTERUS  10/2/2009    DILATION AND CURETTAGE OF UTERUS  2016    andrew novosure ablation    HYSTERECTOMY, TOTAL ABDOMINAL      larh    LEEP      OTHER SURGICAL HISTORY  2012    excision lower sinus tract abdomen    OTHER SURGICAL HISTORY  2012    [facility]    OTHER SURGICAL HISTORY  2001    SICK 14 D. HOSPITALIZED AFTER 1ST BABY    OTHER SURGICAL HISTORY  10/18/2017    [facility]       Family History   Problem Relation Age of Onset    Arthritis Mother     High Blood Pressure Mother     Asthma Mother     Heart Disease Maternal Grandmother     Diabetes Paternal Grandmother     Heart Failure Paternal Grandmother        Medications:     Current Outpatient Medications:     predniSONE (DELTASONE) 20 MG tablet, Take 2 tablets by mouth daily for 5 days, Disp: 10 tablet, Rfl: 0    azithromycin (ZITHROMAX) 250 MG tablet, Take 1 tablet by mouth See Admin Instructions for 5 days 500mg on day 1 followed by 250mg on days 2 - 5, Disp: 6 tablet, Rfl: 0    benzonatate (TESSALON) 100 MG capsule, Take 1 capsule by mouth 3 times daily as needed for Cough, Disp: 21 capsule, Rfl: 0    albuterol sulfate  (90 Base) MCG/ACT inhaler, Inhale 2 puffs into the lungs 4 times daily as needed for Wheezing, Disp: 18 g, Rfl: 0    Allergies: Allergies   Allergen Reactions    Cephalexin      Yeast infection    Pcn [Penicillins] Swelling     Yeast infection         Social History:     Social History     Tobacco Use    Smoking status: Current Every Day Smoker     Packs/day: 1.00     Years: 15.00     Pack years: 15.00     Types: Cigarettes     Last attempt to quit: 11/15/2014     Years since quittin.8    Smokeless tobacco: Never Used   Vaping Use    Vaping Use: Never used   Substance Use Topics    Alcohol use:  Yes     Alcohol/week: 0.0 standard drinks     Comment: rare  Drug use: No       Patient lives at home. Physical Exam:     Vitals:    09/16/21 1529   BP: 130/84   Pulse: 94   Resp: 18   Temp: 97.6 °F (36.4 °C)   SpO2: 99%   Weight: 221 lb (100.2 kg)   Height: 5' 2\" (1.575 m)       Exam:  Physical Exam  Nurse's notes and vital signs reviewed. The patient is not hypoxic. ? General: Alert, no acute distress, patient resting comfortably Patient is not toxic or lethargic. Skin: Warm, intact, no pallor noted. There is no evidence of rash at this time. Head: Normocephalic, atraumatic  Eye: Normal conjunctiva  Ears, Nose, Throat: Right tympanic membrane clear, left tympanic membrane clear. No drainage or discharge noted. No pre- or post-auricular tenderness, erythema, or swelling noted. Nasal congestion, rhinorrhea  Posterior oropharynx shows no erythema, tonsillar hypertrophy, or exudate. the uvula is midline. No trismus or drooling is noted. Moist mucous membranes. Neck: No anterior/posterior lymphadenopathy noted. No erythema, no masses, no fluctuance or induration noted. No meningeal signs. Cardiovascular: Regular Rate and Rhythm  Respiratory: No acute distress, diminished lung sounds bilaterally, mild expiratory wheeze, no crackles. No stridor or retractions are noted. Neurological: A&O x4, normal speech  Psychiatric: Cooperative         Testing:     No results found for this visit on 09/16/21. Covid test is pending at this time    Medical Decision Making:     Vital signs reviewed    Past medical history reviewed. Allergies reviewed. Medications reviewed. Patient on arrival does not appear to be in any apparent distress or discomfort. The patient has been seen and evaluated. The patient does not appear to be toxic or lethargic. The patient will be treated with a azithromycin, Tessalon, albuterol inhaler, the patient will also be given prednisone. The patient will continue to monitor signs symptoms. Covid test is pending.   Start vitamin regimen. Quarantine, isolate. Call with any questions or concerns. The patient was educated on the proper dosage of motrin and tylenol and the appropriate intervals of each. The patient is to increase fluid intake over the next several days. The patient is to use OTC decongestant as needed. The patient is to return to express care or go directly to the emergency department should any of the signs or symptoms worsen. The patient is to followup with primary care physician in 2-3 days for repeat evaluation. The patient has no other questions or concerns at this time the patient will be discharged home. Clinical Impression:   Sarika Carter was seen today for cough and drainage. Diagnoses and all orders for this visit:    Cough  -     COVID-19 Ambulatory; Future  -     azithromycin (ZITHROMAX) 250 MG tablet; Take 1 tablet by mouth See Admin Instructions for 5 days 500mg on day 1 followed by 250mg on days 2 - 5    Exposure to COVID-19 virus    Suspected COVID-19 virus infection    Acute upper respiratory infection, unspecified    Nasal congestion    Postnasal drip    Other orders  -     predniSONE (DELTASONE) 20 MG tablet; Take 2 tablets by mouth daily for 5 days  -     benzonatate (TESSALON) 100 MG capsule; Take 1 capsule by mouth 3 times daily as needed for Cough  -     albuterol sulfate  (90 Base) MCG/ACT inhaler; Inhale 2 puffs into the lungs 4 times daily as needed for Wheezing        The patient is to call for any concerns or return if any of the signs or symptoms worsen. The patient is to follow-up with PCP in the next 2-3 days for repeat evaluation repeat assessment or go directly to the emergency department.      SIGNATURE: Thanh Tilley III, PA-C

## 2021-09-18 LAB
SARS-COV-2: NOT DETECTED
SOURCE: NORMAL

## 2021-10-11 ENCOUNTER — NURSE TRIAGE (OUTPATIENT)
Dept: OTHER | Facility: CLINIC | Age: 41
End: 2021-10-11

## 2021-10-11 ENCOUNTER — OFFICE VISIT (OUTPATIENT)
Dept: FAMILY MEDICINE CLINIC | Age: 41
End: 2021-10-11
Payer: MEDICAID

## 2021-10-11 VITALS
BODY MASS INDEX: 40.85 KG/M2 | DIASTOLIC BLOOD PRESSURE: 100 MMHG | OXYGEN SATURATION: 96 % | SYSTOLIC BLOOD PRESSURE: 158 MMHG | HEART RATE: 109 BPM | TEMPERATURE: 98.7 F | HEIGHT: 62 IN | WEIGHT: 222 LBS

## 2021-10-11 DIAGNOSIS — J69.0 ASPIRATION PNEUMONIA OF BOTH LUNGS, UNSPECIFIED ASPIRATION PNEUMONIA TYPE, UNSPECIFIED PART OF LUNG (HCC): ICD-10-CM

## 2021-10-11 DIAGNOSIS — I10 PRIMARY HYPERTENSION: Primary | ICD-10-CM

## 2021-10-11 DIAGNOSIS — F41.9 ANXIETY: ICD-10-CM

## 2021-10-11 PROCEDURE — G8484 FLU IMMUNIZE NO ADMIN: HCPCS | Performed by: FAMILY MEDICINE

## 2021-10-11 PROCEDURE — 4004F PT TOBACCO SCREEN RCVD TLK: CPT | Performed by: FAMILY MEDICINE

## 2021-10-11 PROCEDURE — 99213 OFFICE O/P EST LOW 20 MIN: CPT | Performed by: FAMILY MEDICINE

## 2021-10-11 PROCEDURE — G8427 DOCREV CUR MEDS BY ELIG CLIN: HCPCS | Performed by: FAMILY MEDICINE

## 2021-10-11 PROCEDURE — G8417 CALC BMI ABV UP PARAM F/U: HCPCS | Performed by: FAMILY MEDICINE

## 2021-10-11 RX ORDER — METOPROLOL SUCCINATE 50 MG/1
50 TABLET, EXTENDED RELEASE ORAL EVERY EVENING
Qty: 30 TABLET | Refills: 3 | Status: SHIPPED
Start: 2021-10-11 | End: 2022-02-18 | Stop reason: SDUPTHER

## 2021-10-11 RX ORDER — LEVOFLOXACIN 750 MG/1
750 TABLET ORAL DAILY
Qty: 5 TABLET | Refills: 0 | Status: SHIPPED | OUTPATIENT
Start: 2021-10-11 | End: 2021-10-16

## 2021-10-11 RX ORDER — FLUCONAZOLE 150 MG/1
150 TABLET ORAL
Qty: 2 TABLET | Refills: 0 | Status: SHIPPED | OUTPATIENT
Start: 2021-10-11 | End: 2021-10-17

## 2021-10-11 NOTE — TELEPHONE ENCOUNTER
Received call from Catalina at [facility] with [employer]. Brief description of triage:   High Blood pressure 183/132 at 1350, 104 heart rate    Triage indicates for patient to be seen in the office today, encouraged to go to the ED if no available appointments     Care advice provided, patient verbalizes understanding; denies any other questions or concerns; instructed to call back for any new or worsening symptoms. Writer provided warm transfer to Leiva at [facility] for appointment scheduling. Attention Provider: Thank you for allowing me to participate in the care of your patient. The patient was connected to triage in response to information provided to the ECC/PSC. Please do not respond through this encounter as the response is not directed to a shared pool. Reason for Disposition   Systolic BP >= 090 OR Diastolic >= 529    Answer Assessment - Initial Assessment Questions  1. BLOOD PRESSURE: \"What is the blood pressure? \" \"Did you take at least two measurements 5 minutes apart?\"      183/132  166/111    2. ONSET: \"When did you take your blood pressure?\"      1350 1409    3. HOW: \"How did you obtain the blood pressure? \" (e.g., visiting nurse, automatic home BP monitor)      Home BP monitor    4. HISTORY: \"Do you have a history of high blood pressure? \"     No    5. MEDICATIONS: N[place] Dang you taking any medications for blood pressure? \" \"Have you missed any doses recently? \"      No    6. OTHER SYMPTOMS: \"Do you have any symptoms? \" (e.g., headache, chest pain, blurred vision, difficulty breathing, weakness)      Foggy head, anxiety, eye twitching    7. PREGNANCY: \"Is there any chance you are pregnant? \" \"When was your last menstrual period? \"      No, hysterectomy    Protocols used: HIGH BLOOD PRESSURE-ADULT-[place]

## 2021-10-11 NOTE — PROGRESS NOTES
Kirt Mayer (:  1980) is a 46 y.o. female,Established patient, here for evaluation of the following chief complaint(s):  Hypertension         ASSESSMENT/PLAN:  1. Primary hypertension  -     Hemoglobin A1C; Future  -     Microalbumin / Creatinine Urine Ratio; Future  -     Lipid Panel; Future  -     Basic Metabolic Panel; Future  -     Hepatic Function Panel; Future  -     TSH without Reflex; Future  -     CBC Auto Differential; Future  -     Uric Acid; Future  -     T4, Free; Future  -     T3, FREE; Future  -     Urinalysis with Microscopic; Future  -     metoprolol succinate (TOPROL XL) 50 MG extended release tablet; Take 1 tablet by mouth every evening, Disp-30 tablet, R-3Normal  2. Anxiety  -     Hemoglobin A1C; Future  -     Microalbumin / Creatinine Urine Ratio; Future  -     Lipid Panel; Future  -     Basic Metabolic Panel; Future  -     Hepatic Function Panel; Future  -     TSH without Reflex; Future  -     CBC Auto Differential; Future  -     Uric Acid; Future  -     T4, Free; Future  -     T3, FREE; Future  -     Urinalysis with Microscopic; Future  3. Aspiration pneumonia of both lungs, unspecified aspiration pneumonia type, unspecified part of lung ([facility] [address]  -     Hemoglobin A1C; Future  -     Microalbumin / Creatinine Urine Ratio; Future  -     Lipid Panel; Future  -     Basic Metabolic Panel; Future  -     Hepatic Function Panel; Future  -     TSH without Reflex; Future  -     CBC Auto Differential; Future  -     Uric Acid; Future  -     T4, Free; Future  -     T3, FREE; Future  -     Urinalysis with Microscopic; Future  -     levoFLOXacin (LEVAQUIN) 750 MG tablet; Take 1 tablet by mouth daily for 5 days, Disp-5 tablet, R-0Normal  -     fluconazole (DIFLUCAN) 150 MG tablet; Take 1 tablet by mouth every 72 hours for 6 days, Disp-2 tablet, R-0Normal    At this time we will treat symptomatically. Patient will follow up with new PCP later on in the week.   Red flags discussed with patient if  these occur she is to go directly to the emergency department. No follow-ups on file. Subjective   SUBJECTIVE/OBJECTIVE:  HPI   Patient sent in by OB/GYN for evaluation of hypertension. Patient states that for the last several days she has noticed an increase with mild chest pain and fatigue. Denies any changes to medications. Denies any recent systemic illness prior to symptoms beginning. Has been checking blood pressures at home and it has been significantly and persistently elevated. Previous hysterectomy. Not taking any long-term medications currently other than the Ditropan. Only other issue was that she was kayaking on the [facility] when she fell in. Questionable aspiration pneumonia. Will cover for this also. Denies any fever but has had mild nonproductive cough. Review of Systems   Constitutional: Positive for fatigue. Negative for chills and fever. HENT: Negative for congestion, hearing loss, nosebleeds and sore throat. Eyes: Negative for photophobia. Respiratory: Positive for cough and chest tightness. Negative for shortness of breath. Cardiovascular: Negative for chest pain, palpitations and leg swelling. Gastrointestinal: Negative for abdominal pain, blood in stool, constipation, diarrhea, nausea and vomiting. Endocrine: Negative for polydipsia. Genitourinary: Negative for dysuria, frequency, hematuria and urgency. Musculoskeletal: Negative for back pain and myalgias. Skin: Negative. Neurological: Positive for headaches. Negative for dizziness, tremors and weakness. Hematological: Does not bruise/bleed easily. Psychiatric/Behavioral: Negative for hallucinations and suicidal ideas. All other systems reviewed and are negative.          Current Outpatient Medications:     metoprolol succinate (TOPROL XL) 50 MG extended release tablet, Take 1 tablet by mouth every evening, Disp: 30 tablet, Rfl: 3    levoFLOXacin (LEVAQUIN) 750 MG tablet, Take 1 tablet by mouth daily for 5 days, Disp: 5 tablet, Rfl: 0    fluconazole (DIFLUCAN) 150 MG tablet, Take 1 tablet by mouth every 72 hours for 6 days, Disp: 2 tablet, Rfl: 0    oxybutynin (DITROPAN XL) 5 MG extended release tablet, Take 1 tablet by mouth daily, Disp: 30 tablet, Rfl: 0    albuterol sulfate  (90 Base) MCG/ACT inhaler, Inhale 2 puffs into the lungs 4 times daily as needed for Wheezing (Patient not taking: Reported on 10/11/2021), Disp: 18 g, Rfl: 0   Patient Active Problem List   Diagnosis    Suspected damage to fetus from drugs,(exposure to general anesthesia) affecting management of mother, antepartum    Pregnancy with history of pre-term labor    H/O LEEP (loop electrosurgical excision procedure) of cervix complicating pregnancy    Current smoker    Previous  delivery, antepartum condition or complication    Obesity (BMI 30-39. 9)    Skin lesion    Other congenital or acquired abnormality of cervix, antepartum condition or complication    Gestational diabetes mellitus, antepartum    Short cervix affecting pregnancy    GDM, class A1    Abdominal abscess    Morbidly obese (HCC)    Anxiety    Primary hypertension     Past Medical History:   Diagnosis Date    Abnormal Pap smear     Abnormal Pap smear of cervix     Abscess     Abscess     on legs in past, last on 10/1/16 no current abscess, no drainage    Anesthesia     pt wakes up \" freaking out \" per pt    Cancer ([address]     cervical    Cervical cancer ([address]     Gestational diabetes mellitus, antepartum 2012    no current issues, diet controlled    Hypotension      Past Surgical History:   Procedure Laterality Date     SECTION  2010    DILATION AND CURETTAGE      suction D&C    DILATION AND CURETTAGE OF UTERUS  10/2/2009    DILATION AND CURETTAGE OF UTERUS  2016    andrew novosure ablation    HYSTERECTOMY, TOTAL ABDOMINAL      [facility]h    LEEP      OTHER SURGICAL HISTORY  2012    excision lower sinus tract abdomen    OTHER SURGICAL HISTORY  2012    [facility]    OTHER SURGICAL HISTORY  2001    SICK 14 D. HOSPITALIZED AFTER 1ST BABY    OTHER SURGICAL HISTORY  10/18/2017    [facility]     Social History     Socioeconomic History    Marital status:      Spouse name: Not on file    Number of children: Not on file    Years of education: Not on file    Highest education level: Not on file   Occupational History    Not on file   Tobacco Use    Smoking status: Current Every Day Smoker     Packs/day: 1.00     Years: 15.00     Pack years: 15.00     Types: Cigarettes     Last attempt to quit: 11/15/2014     Years since quittin.9    Smokeless tobacco: Never Used   Vaping Use    Vaping Use: Never used   Substance and Sexual Activity    Alcohol use: Yes     Alcohol/week: 0.0 standard drinks     Comment: rare    Drug use: No    Sexual activity: Yes     Partners: Male     Birth control/protection: Surgical     Comment: ESSURE   Other Topics Concern    Not on file   Social History Narrative    Not on file     Social Determinants of Health     Financial Resource Strain:     Difficulty of Paying Living Expenses:    Food Insecurity:     Worried About Running Out of Food in the Last Year:     [address] in the Last Year:    Transportation Needs:     Lack of Transportation (Medical):      Lack of Transportation (Non-Medical):    Physical Activity:     Days of Exercise per Week:     Minutes of Exercise per Session:    Stress:     Feeling of Stress :    Social Connections:     Frequency of Communication with Friends and Family:     Frequency of Social Gatherings with Friends and Family:     Attends Buddhism Services:     Active Member of Clubs or Organizations:     Attends Club or Organization Meetings:     Marital Status:    Intimate Partner Violence:     Fear of Current or Ex-Partner:     Emotionally Abused:     Physically Abused:     Sexually Abused:      Family History   Problem Relation Age of Onset    Arthritis Mother     High Blood Pressure Mother     Asthma Mother     Heart Disease Maternal Grandmother     Diabetes Paternal Grandmother     Heart Failure Paternal Grandmother       There are no preventive care reminders to display for this patient. There are no preventive care reminders to display for this patient. There are no preventive care reminders to display for this patient. Health Maintenance Due   Topic    DTaP/Tdap/Td vaccine (1 - Tdap)      Health Maintenance   Topic Date Due    Hepatitis C screen  Never done    Varicella vaccine (1 of 2 - 2-dose childhood series) Never done    Pneumococcal 0-64 years Vaccine (1 of 2 - PPSV23) Never done    HIV screen  Never done    DTaP/Tdap/Td vaccine (1 - Tdap) Never done    Flu vaccine (1) Never done    COVID-19 Vaccine  Completed    Hepatitis A vaccine  Aged Out    Hepatitis B vaccine  Aged Out    Hib vaccine  Aged Out    Meningococcal (ACWY) vaccine  Aged Out      There are no preventive care reminders to display for this patient. There are no preventive care reminders to display for this patient. BP (!) 158/100 (Site: Left Upper Arm)   Pulse 109   Temp 98.7 °F (37.1 °C)   Ht 5' 2\" (1.575 m)   Wt 222 lb (100.7 kg)   LMP 08/31/2017 (Exact Date)   SpO2 96%   BMI 40.60 kg/m²     Objective   Physical Exam  Vitals reviewed. Constitutional:       Appearance: She is obese. HENT:      Head: Normocephalic and atraumatic. Eyes:      General: No scleral icterus. Conjunctiva/sclera: Conjunctivae normal.      Pupils: Pupils are equal, round, and reactive to light. Neck:      Thyroid: No thyromegaly. Cardiovascular:      Rate and Rhythm: Normal rate and regular rhythm. Heart sounds: Normal heart sounds. No murmur heard. Pulmonary:      Effort: Pulmonary effort is normal.      Breath sounds: Wheezing present. No rales. Abdominal:      General: Bowel sounds are normal. There is no distension. Palpations: Abdomen is soft. Tenderness: There is no abdominal tenderness. Musculoskeletal:         General: Normal range of motion. Cervical back: Neck supple. Lymphadenopathy:      Cervical: No cervical adenopathy. Skin:     General: Skin is warm and dry. Findings: No erythema or rash. Neurological:      Mental Status: She is alert and oriented to person, place, and time. Cranial Nerves: No cranial nerve deficit. Psychiatric:         Judgment: Judgment normal.                  An electronic signature was used to authenticate this note.     --Kaden Conklin, DO

## 2021-10-12 ASSESSMENT — ENCOUNTER SYMPTOMS
CHEST TIGHTNESS: 1
ABDOMINAL PAIN: 0
SHORTNESS OF BREATH: 0
COUGH: 1
SORE THROAT: 0
BACK PAIN: 0
DIARRHEA: 0
NAUSEA: 0
VOMITING: 0
CONSTIPATION: 0
BLOOD IN STOOL: 0
PHOTOPHOBIA: 0

## 2021-10-13 ENCOUNTER — OFFICE VISIT (OUTPATIENT)
Dept: PRIMARY CARE CLINIC | Age: 41
End: 2021-10-13
Payer: MEDICAID

## 2021-10-13 VITALS
SYSTOLIC BLOOD PRESSURE: 134 MMHG | RESPIRATION RATE: 20 BRPM | WEIGHT: 222 LBS | DIASTOLIC BLOOD PRESSURE: 86 MMHG | OXYGEN SATURATION: 98 % | TEMPERATURE: 97.8 F | HEART RATE: 93 BPM | HEIGHT: 62 IN | BODY MASS INDEX: 40.85 KG/M2

## 2021-10-13 DIAGNOSIS — E66.01 CLASS 3 SEVERE OBESITY DUE TO EXCESS CALORIES WITH SERIOUS COMORBIDITY AND BODY MASS INDEX (BMI) OF 40.0 TO 44.9 IN ADULT (HCC): ICD-10-CM

## 2021-10-13 DIAGNOSIS — Z76.89 ESTABLISHING CARE WITH NEW DOCTOR, ENCOUNTER FOR: ICD-10-CM

## 2021-10-13 DIAGNOSIS — K57.90 DIVERTICULOSIS: Primary | ICD-10-CM

## 2021-10-13 DIAGNOSIS — F41.9 ANXIETY: ICD-10-CM

## 2021-10-13 DIAGNOSIS — R32 URINARY INCONTINENCE, UNSPECIFIED TYPE: ICD-10-CM

## 2021-10-13 DIAGNOSIS — F17.200 CURRENT SMOKER: ICD-10-CM

## 2021-10-13 DIAGNOSIS — I10 PRIMARY HYPERTENSION: ICD-10-CM

## 2021-10-13 DIAGNOSIS — Z90.710 HISTORY OF HYSTERECTOMY: ICD-10-CM

## 2021-10-13 PROCEDURE — 99214 OFFICE O/P EST MOD 30 MIN: CPT | Performed by: FAMILY MEDICINE

## 2021-10-13 PROCEDURE — G8484 FLU IMMUNIZE NO ADMIN: HCPCS | Performed by: FAMILY MEDICINE

## 2021-10-13 PROCEDURE — G8417 CALC BMI ABV UP PARAM F/U: HCPCS | Performed by: FAMILY MEDICINE

## 2021-10-13 PROCEDURE — 4004F PT TOBACCO SCREEN RCVD TLK: CPT | Performed by: FAMILY MEDICINE

## 2021-10-13 PROCEDURE — G8427 DOCREV CUR MEDS BY ELIG CLIN: HCPCS | Performed by: FAMILY MEDICINE

## 2021-10-13 SDOH — ECONOMIC STABILITY: FOOD INSECURITY: WITHIN THE PAST 12 MONTHS, YOU WORRIED THAT YOUR FOOD WOULD RUN OUT BEFORE YOU GOT MONEY TO BUY MORE.: NEVER TRUE

## 2021-10-13 SDOH — ECONOMIC STABILITY: FOOD INSECURITY: WITHIN THE PAST 12 MONTHS, THE FOOD YOU BOUGHT JUST DIDN'T LAST AND YOU DIDN'T HAVE MONEY TO GET MORE.: NEVER TRUE

## 2021-10-13 ASSESSMENT — PATIENT HEALTH QUESTIONNAIRE - PHQ9
SUM OF ALL RESPONSES TO PHQ9 QUESTIONS 1 & 2: 2
2. FEELING DOWN, DEPRESSED OR HOPELESS: 2
SUM OF ALL RESPONSES TO PHQ QUESTIONS 1-9: 2
1. LITTLE INTEREST OR PLEASURE IN DOING THINGS: 0
SUM OF ALL RESPONSES TO PHQ QUESTIONS 1-9: 2
SUM OF ALL RESPONSES TO PHQ QUESTIONS 1-9: 2

## 2021-10-13 ASSESSMENT — ENCOUNTER SYMPTOMS
SHORTNESS OF BREATH: 0
BACK PAIN: 0
BLOOD IN STOOL: 0
VOMITING: 0
CONSTIPATION: 1
EYE REDNESS: 0
DIARRHEA: 0
SORE THROAT: 0
NAUSEA: 0
ABDOMINAL PAIN: 0
RHINORRHEA: 0
WHEEZING: 0
PHOTOPHOBIA: 0
COUGH: 0

## 2021-10-13 ASSESSMENT — SOCIAL DETERMINANTS OF HEALTH (SDOH): HOW HARD IS IT FOR YOU TO PAY FOR THE VERY BASICS LIKE FOOD, HOUSING, MEDICAL CARE, AND HEATING?: NOT HARD AT ALL

## 2021-10-13 NOTE — PROGRESS NOTES
10/13/2021    Chief Complaint   Patient presents with   [facility] New Doctor    Hypertension     has been running really high over past week     HPI  Rob Matthews (:  1980) is a 46 y.o. female, here for evaluation of the following medical concerns:    Patient is a 82-year-old female with a past medical history of hypertension, anxiety, concerns for recent aspiration pneumonia, tobacco use, and urinary issues who presents today to establish care with myself. Patient was recently seen in a walk-in clinic and started on Levaquin and fluconazole in regards to concerns of aspiration pneumonia. Diverticulosis: Patient reports this was diagnosed in the ER. Patient was treated in the past for this issue with antibiotics. Reports that the symptoms resolved. Patient plans to restart her previous diet. Not sure of the exact trigger. Patient reports no usual issues with constipation. Was advised to see GI but has been reluctant to do such. Hx of hysterectomy due to CA in . Still follows with Ob/Gyn regularly. Urinary incontinence: Patient reports new medication is not helping much. May be causing constipation now. Hx of of Abscess and MRSA. HTN: New diagnosis. Patient recently placed on Toprol 50 mg extended release. Patient reports he is tolerating his medication well. Blood pressures below her goal of 140/90. No side effects. History of panic attacks. Reports a lot of worrying. Reports that she was previously diagnosed with bipolar disorder but reports the medications she was placed on did not help. No SI/HI. Patient is not interested in any medications as she has been on extensive regimen in the past without benefit. Obesity: BMI 40.60. Tobacco use: 1 ppd. Patient had an extensive amount of lab work recently performed: T3 3.0. Free T4 1.1. Uric acid 5.8. CBC shows an elevated BMP normal besides elevated glucose of 117.   Hemoglobin of 16.8 and elevated hematocrit of 49.6. TSH 1.0. Hepatic function panel normal.  Total cholesterol 177, triglycerides 178, HDL 32, . A1c 5.6%. Urine microalbumin negative. Urine analysis showed rare bacteria, mucus, and glucose. Review of Systems   Constitutional: Negative for chills and fever. HENT: Negative for congestion, hearing loss, postnasal drip, rhinorrhea, sneezing, sore throat and tinnitus. Eyes: Negative for photophobia and redness. Respiratory: Negative for cough, shortness of breath and wheezing. Cardiovascular: Negative for chest pain, palpitations and leg swelling. Gastrointestinal: Positive for constipation. Negative for abdominal pain, blood in stool, diarrhea, nausea and vomiting. Endocrine: Negative for polydipsia and polyuria. Genitourinary: Negative for difficulty urinating, dysuria and hematuria. Musculoskeletal: Negative for arthralgias and back pain. Skin: Negative for rash. Neurological: Negative for dizziness, weakness, light-headedness, numbness and headaches. Psychiatric/Behavioral: The patient is nervous/anxious. Prior to Visit Medications    Medication Sig Taking?  Authorizing Provider   metoprolol succinate (TOPROL XL) 50 MG extended release tablet Take 1 tablet by mouth every evening Yes Silas Conklin, DO   levoFLOXacin (LEVAQUIN) 750 MG tablet Take 1 tablet by mouth daily for 5 days Yes Silas Conklin, DO   fluconazole (DIFLUCAN) 150 MG tablet Take 1 tablet by mouth every 72 hours for 6 days Yes Silas Conklin, DO   oxybutynin (DITROPAN XL) 5 MG extended release tablet Take 1 tablet by mouth daily Yes Anton Boothe, DO        Allergies   Allergen Reactions    Pcn [Penicillins] Shortness Of Breath and Swelling     Yeast infection      Cephalexin      Yeast infection       Past Medical History:   Diagnosis Date    Abnormal Pap smear     Abnormal Pap smear of cervix     Abscess     Abscess     on legs in past, last on 10/1/16 no current abscess, no drainage    Anesthesia     pt wakes up \" freaking out \" per pt    Cancer [facility])     cervical    Cerv[facility] cancer ([facility] [address]     Gestational diabetes mellitus, antepartum 2012    no current issues, diet controlled    Hypotension         Menstrual History:  OB History        5    Para   4    Term   1       3    AB   1    Living   4       SAB        TAB        Ectopic   1    Molar        Multiple        Live Births   4                 Past Surgical History:   Procedure Laterality Date     SECTION  2010    DILATION AND CURETTAGE      suction D&C    DILATION AND CURETTAGE OF UTERUS  10/2/2009    DILATION AND CURETTAGE OF UTERUS  2016    andrew novosure ablation    HYSTERECTOMY, TOTAL ABDOMINAL      larh    LEEP      OTHER SURGICAL HISTORY  2012    excision lower sinus tract abdomen    OTHER SURGICAL HISTORY  2012    [facility]    OTHER SURGICAL HISTORY      SICK 16 D.  HOSPITALIZED AFTER 1ST BABY    OTHER SURGICAL HISTORY  10/18/2017    [facility]       Family History   Problem Relation Age of Onset    Arthritis Mother     High Blood Pressure Mother     Asthma Mother     Heart Disease Maternal Grandmother     Diabetes Paternal Grandmother     Heart Failure Paternal Grandmother        Immunization History   Administered Date(s) Administered    COVID-19, J&J, PF, 0.5 mL 2021    Rho (D) Immune Globulin 2012, 2012       Health Maintenance   Topic Date Due    Hepatitis C screen  Never done    Varicella vaccine (1 of 2 - 2-dose childhood series) Never done    Pneumococcal 0-64 years Vaccine (1 of 2 - PPSV23) Never done    HIV screen  Never done    DTaP/Tdap/Td vaccine (1 - Tdap) Never done    Flu vaccine (1) Never done    Potassium monitoring  10/11/2022    Creatinine monitoring  10/11/2022    Diabetes screen  10/11/2024    COVID-19 Vaccine  Completed    Hepatitis A vaccine  Aged Out    Hepatitis B vaccine  Aged Out    Hib vaccine Aged Out    Meningococcal (ACWY) vaccine  Aged Out       Social History     Socioeconomic History    Marital status:      Spouse name: Not on file    Number of children: Not on file    Years of education: Not on file    Highest education level: Not on file   Occupational History    Not on file   Tobacco Use    Smoking status: Current Every Day Smoker     Packs/day: 1.00     Years: 15.00     Pack years: 15.00     Types: Cigarettes     Last attempt to quit: 11/15/2014     Years since quittin.9    Smokeless tobacco: Never Used   Vaping Use    Vaping Use: Never used   Substance and Sexual Activity    Alcohol use: Yes     Alcohol/week: 0.0 standard drinks     Comment: rare    Drug use: No    Sexual activity: Yes     Partners: Male     Birth control/protection: Surgical     Comment: ESSURE   Other Topics Concern    Not on file   Social History Narrative    Not on file     Social Determinants of Health     Financial Resource Strain: Low Risk     Difficulty of Paying Living Expenses: Not hard at all   Food Insecurity: No Food Insecurity    Worried About [address] [employer] in the Last Year: Never true    [facility] in the Last Year: Never true   Transportation Needs:     Lack of Transportation (Medical):      Lack of Transportation (Non-Medical):    Physical Activity:     Days of Exercise per Week:     Minutes of Exercise per Session:    Stress:     Feeling of Stress :    Social Connections:     Frequency of Communication with Friends and Family:     Frequency of Social Gatherings with Friends and Family:     Attends [employer] Services:     Active Member of Clubs or Organizations:     Attends Club or Organization Meetings:     Marital Status:    Intimate Partner Violence:     Fear of Current or Ex-Partner:     Emotionally Abused:     Physically Abused:     Sexually Abused:            Vitals:    10/13/21 1505   BP: 134/86   Pulse: 93   Resp: 20   Temp: 97.8 °F (36.6 °C) TempSrc: Temporal   SpO2: 98%   Weight: 222 lb (100.7 kg)   Height: 5' 2\" (1.575 m)       Estimated body mass index is 40.6 kg/m² as calculated from the following:    Height as of this encounter: 5' 2\" (1.575 m). Weight as of this encounter: 222 lb (100.7 kg). Physical Exam  Vitals and nursing note reviewed. Constitutional:       Appearance: She is well-developed. HENT:      Head: Normocephalic. Right Ear: Tympanic membrane and external ear normal.      Left Ear: Tympanic membrane and external ear normal.   Eyes:      Extraocular Movements: Extraocular movements intact. Conjunctiva/sclera: Conjunctivae normal.      Pupils: Pupils are equal, round, and reactive to light. Neck:      Trachea: Trachea normal.   Cardiovascular:      Rate and Rhythm: Normal rate and regular rhythm. Pulses:           Radial pulses are 2+ on the right side and 2+ on the left side. Posterior tibial pulses are 2+ on the right side and 2+ on the left side. Heart sounds: Normal heart sounds. No murmur heard. Pulmonary:      Effort: Pulmonary effort is normal. No respiratory distress. Breath sounds: Normal breath sounds. No decreased breath sounds, wheezing or rales. Abdominal:      General: Bowel sounds are normal. There is no distension. Palpations: Abdomen is soft. Tenderness: There is no abdominal tenderness. There is no rebound. Musculoskeletal:         General: Normal range of motion. Cervical back: Neck supple. Right lower leg: No edema. Left lower leg: No edema. Skin:     General: Skin is warm and dry. Findings: No rash. Neurological:      Mental Status: She is alert and oriented to person, place, and time. Cranial Nerves: No cranial nerve deficit. Sensory: No sensory deficit. Deep Tendon Reflexes:      Reflex Scores:       Patellar reflexes are 2+ on the right side and 2+ on the left side.   Psychiatric:         Mood and Affect: Mood normal.         Behavior: Behavior normal.         Patient Active Problem List    Diagnosis Date Noted    Urinary incontinence 10/13/2021    History of hysterectomy 10/13/2021    Anxiety 10/11/2021    Primary hypertension 10/11/2021    Morbidly obese ([facility] [address] 2020    Abdominal abscess 2013    Short cervix affecting pregnancy 2013    GDM, class A1 2013    Gestational diabetes mellitus, antepartum 2012    Other congenital or acquired abnormality of cervix, antepartum condition or complication     Skin lesion 10/31/2012    Obesity (BMI 30-39.9) 10/19/2012    H/O LEEP (loop electrosurgical excision procedure) of cervix complicating pregnancy     Current smoker 2012     Overview Note:     replace inactive diagnosis      Previous  delivery, antepartum condition or complication     Pregnancy with history of pre-term labor 2012    Suspected damage to fetus from drugs,(exposure to general anesthesia) affecting management of mother, antepartum 2012         ASSESSMENT/PLAN:    Patient had an extensive amount of lab work recently performed: T3 3.0. Free T4 1.1. Uric acid 5.8. CBC shows an elevated BMP normal besides elevated glucose of 117. Hemoglobin of 16.8 and elevated hematocrit of 49.6. TSH 1.0. Hepatic function panel normal.  Total cholesterol 177, triglycerides 178, HDL 32, . A1c 5.6%. Urine microalbumin negative. Urine analysis showed rare bacteria, mucus, and glucose. Germania Amado was seen today for established new doctor and hypertension. Diagnoses and all orders for this visit:    Diverticulosis  -     External Referral To Gastroenterology  Symptoms have resolved. Found on CT scan. Patient also had cholelithiasis. Patient was previously referred to GI but has been reluctant to do such.     Class 3 severe obesity due to excess calories with serious comorbidity and body mass index (BMI) of 40.0 to 44.9 in adult [facility])  -     Yaquelin Edge MD, [facility], [facility]  Patient is motivated in regards to losing weight. Referral to weight loss specialist.  Lifestyle modification reviewed and advised. Primary hypertension  Blood pressure is better controlled today and below her goal of 140/90. Patient reports she is tolerating her blood pressure medication well without side effects. Continue current dosage. We will follow-up this issue in 1 month. Anxiety  Ongoing issue. Previously possibly diagnosed with bipolar disorder. Patient has been on multiple medications and previously followed with psychiatry. Reports worsening issues when placed on medication. No SI/HI. Patient is not interested in having this issue addressed. Current smoker  1 pack/day. Precontemplative. Tobacco cessation advised. History of hysterectomy  Secondary to cancer. Following with OB/GYN. Urinary incontinence, unspecified type  Patient is currently on a medication regards to such. May be having constipation-like symptoms from this medication. Patient to follow-up with OB/GYN in regards to this issue. Establishing care with new doctor, encounter for  Chart review and updated at length. Health Maintenance to be reviewed at a future appointment. Return in about 4 weeks (around 11/10/2021) for Follow-up Appointment From Today's Visit.       Educational materials and/or home exercises printed for patient's review and were included in patient instructions on his/her After Visit Summary and given to patient at the end of visit.       Counseled regarding above diagnosis, including possible risks and complications,  especially if left uncontrolled.     Counseled regarding the possible side effects, risks, benefits and alternatives to treatment; patient and/or guardianverbalizes understanding, agrees, feels comfortable with and wishes to proceed with above treatment plan.     Advised patient to call with any new medication issues, and read all Rx info from pharmacy to assure aware of all possible risks and side effects of medication before taking.     Reviewed age and gender appropriate health screening exams and vaccinations. Advised patient regarding importance of keeping up withrecommended health maintenance and to schedule as soon as possible if overdue, as this is important in assessing for undiagnosed pathology, especially cancer, as well as protecting against potentially harmful/lifethreatening disease.       Patient and/or guardian verbalizes understanding and agrees with abovecounseling, assessment and plan.     All questions answered. An  electronic signature was used to authenticatethis note.     --Ivanna Rubalcava MD on 10/13/21 at 1:04 PM EDT

## 2021-11-17 ENCOUNTER — OFFICE VISIT (OUTPATIENT)
Dept: PRIMARY CARE CLINIC | Age: 41
End: 2021-11-17
Payer: MEDICAID

## 2021-11-17 VITALS
TEMPERATURE: 97.4 F | HEART RATE: 97 BPM | SYSTOLIC BLOOD PRESSURE: 152 MMHG | OXYGEN SATURATION: 99 % | BODY MASS INDEX: 41.04 KG/M2 | HEIGHT: 62 IN | DIASTOLIC BLOOD PRESSURE: 100 MMHG | WEIGHT: 223 LBS

## 2021-11-17 DIAGNOSIS — E66.01 CLASS 3 SEVERE OBESITY DUE TO EXCESS CALORIES WITH SERIOUS COMORBIDITY AND BODY MASS INDEX (BMI) OF 40.0 TO 44.9 IN ADULT (HCC): ICD-10-CM

## 2021-11-17 DIAGNOSIS — R32 URINARY INCONTINENCE, UNSPECIFIED TYPE: ICD-10-CM

## 2021-11-17 DIAGNOSIS — I10 PRIMARY HYPERTENSION: Primary | ICD-10-CM

## 2021-11-17 DIAGNOSIS — F41.9 ANXIETY: ICD-10-CM

## 2021-11-17 DIAGNOSIS — K57.90 DIVERTICULOSIS: ICD-10-CM

## 2021-11-17 PROCEDURE — 4004F PT TOBACCO SCREEN RCVD TLK: CPT | Performed by: FAMILY MEDICINE

## 2021-11-17 PROCEDURE — G8417 CALC BMI ABV UP PARAM F/U: HCPCS | Performed by: FAMILY MEDICINE

## 2021-11-17 PROCEDURE — G8484 FLU IMMUNIZE NO ADMIN: HCPCS | Performed by: FAMILY MEDICINE

## 2021-11-17 PROCEDURE — G8427 DOCREV CUR MEDS BY ELIG CLIN: HCPCS | Performed by: FAMILY MEDICINE

## 2021-11-17 PROCEDURE — 99214 OFFICE O/P EST MOD 30 MIN: CPT | Performed by: FAMILY MEDICINE

## 2021-11-17 RX ORDER — HYDROXYZINE HYDROCHLORIDE 25 MG/1
25 TABLET, FILM COATED ORAL EVERY 8 HOURS PRN
Qty: 30 TABLET | Refills: 0 | Status: SHIPPED | OUTPATIENT
Start: 2021-11-17 | End: 2021-11-27

## 2021-11-17 RX ORDER — LOSARTAN POTASSIUM 50 MG/1
50 TABLET ORAL DAILY
Qty: 30 TABLET | Refills: 1 | Status: SHIPPED
Start: 2021-11-17 | End: 2022-01-12

## 2021-11-17 ASSESSMENT — ENCOUNTER SYMPTOMS
CONSTIPATION: 0
SORE THROAT: 0
NAUSEA: 0
RHINORRHEA: 0
WHEEZING: 0
VOMITING: 0
ABDOMINAL PAIN: 0
DIARRHEA: 0
SHORTNESS OF BREATH: 0

## 2021-11-17 NOTE — PROGRESS NOTES
2021     Chief Complaint   Patient presents with    Hypertension     pt has been keeping track of her bp and states it has been all over the place   HPI  Elda Juares (:  1980) is a 46 y.o. female, here for evaluation of the following medical concerns:    Patient is a 62-year-old female with a past medical history of hypertension, anxiety, concerns for possible aspiration pneumonia, tobacco use disorder, tobacco use disorder, obesity, diverticulosis, history of hysterectomy due to cancer in , history of abscess/MRSA and urinary issues. Patient presents today to follow-up for establishment office appointment. At the patient's last office appointment she was referred to Dr. Tara Brito for weight loss concerns and gastroenterology. Patient did see OB/GYN in regards to her urinary issues. Patient was increased on oxybutynin. Patient was also advised on at home exercises to help with such issue. Patient was notified that she may need a mesh sling in the future. Patient presents primarily to follow-up her blood pressure. Patient's blood pressure has been significantly above her goal of 140/90. Patient does report some headaches, mental fogginess, chest discomfort with elevated blood pressure. Patient has been taking only metoprolol 50 mg daily. Patient reports some issues with her anxiety. Patient is under a lot of stress due to family issues and work. Patient has a possible diagnosis of bipolar disorder. No longer following with psychiatry or psychology. Review of Systems   Constitutional: Negative for chills and fever. HENT: Negative for congestion, rhinorrhea and sore throat. Respiratory: Negative for shortness of breath and wheezing. Cardiovascular: Negative for chest pain and leg swelling. Gastrointestinal: Negative for abdominal pain, constipation, diarrhea, nausea and vomiting. Skin: Negative for rash.    Neurological: Positive for light-headedness and headaches. Psychiatric/Behavioral: The patient is nervous/anxious. Past Medical History:   Diagnosis Date    Abnormal Pap smear     Abnormal Pap smear of cervix     Abscess     Abscess     on legs in past, last on 10/1/16 no current abscess, no drainage    Anesthesia     pt wakes up \" freaking out \" per pt    Cancer [facility])     cervical    Cervical cancer ([facility] [address]     Gestational diabetes mellitus, antepartum 2012    no current issues, diet controlled    Hypotension        Prior to Visit Medications    Medication Sig Taking? Authorizing Provider   losartan (COZAAR) 50 MG tablet Take 1 tablet by mouth daily Yes Angelique Lynn MD   hydrOXYzine (ATARAX) 25 MG tablet Take 1 tablet by mouth every 8 hours as needed for Anxiety Yes Angelique Lynn MD   oxybutynin (DITROPAN XL) 10 MG extended release tablet Take 1 tablet by mouth daily Yes Elvis Goldberg, DO   metoprolol succinate (TOPROL XL) 50 MG extended release tablet Take 1 tablet by mouth every evening Yes Silas Conklin,    oxybutynin (DITROPAN XL) 5 MG extended release tablet Take 1 tablet by mouth daily Yes Elvis Goldberg,         Allergies   Allergen Reactions    Pcn [Penicillins] Shortness Of Breath and Swelling     Yeast infection      Cephalexin      Yeast infection       Social History     Tobacco Use    Smoking status: Current Every Day Smoker     Packs/day: 1.00     Years: 15.00     Pack years: 15.00     Types: Cigarettes     Last attempt to quit: 11/15/2014     Years since quittin.0    Smokeless tobacco: Never Used   Substance Use Topics    Alcohol use: Yes     Alcohol/week: 0.0 standard drinks     Comment: rare           Vitals:    21 1453   BP: (!) 152/100   Pulse: 97   Temp: 97.4 °F (36.3 °C)   SpO2: 99%   Weight: 223 lb (101.2 kg)   Height: 5' 2\" (1.575 m)     Estimated body mass index is 40.79 kg/m² as calculated from the following:    Height as of this encounter: 5' 2\" (1.575 m).     Weight as of this encounter: 223 lb (101.2 kg). Physical Exam  Constitutional:       Appearance: She is well-developed. HENT:      Head: Normocephalic. Eyes:      Extraocular Movements: Extraocular movements intact. Conjunctiva/sclera: Conjunctivae normal.   Cardiovascular:      Rate and Rhythm: Normal rate and regular rhythm. Heart sounds: Normal heart sounds. No murmur heard. Pulmonary:      Effort: Pulmonary effort is normal.      Breath sounds: Normal breath sounds. No wheezing or rales. Abdominal:      General: Bowel sounds are normal.      Palpations: Abdomen is soft. Tenderness: There is no abdominal tenderness. Musculoskeletal:      Right lower leg: No edema. Left lower leg: No edema. Neurological:      General: No focal deficit present. Mental Status: She is alert. Comments: Cranial nerves grossly intact   Psychiatric:         Mood and Affect: Mood normal.         Judgment: Judgment normal.         ASSESSMENT/PLAN:  Roderick Hyatt was seen today for hypertension. Diagnoses and all orders for this visit:    Primary hypertension  -     losartan (COZAAR) 50 MG tablet; Take 1 tablet by mouth daily  Pressure elevated. Goal is to get blood pressure less than 140/90. We will add on 25 mg losartan daily. Patient to contact us in 2 weeks to notify us of how her blood pressure is doing. If blood pressures above 140/90 consistently will increase her losartan to 50 mg daily. Continue metoprolol. Patient to call with any side effects. Side effects reviewed at length during appointment. Anxiety  -     hydrOXYzine (ATARAX) 25 MG tablet; Take 1 tablet by mouth every 8 hours as needed for Anxiety  Offered patient alternative medications and/or referral to psychiatry/psychology. Patient would like to hold off given her other medical issues and scheduled referrals. Patient denies any SI/HI. For panic attacks and severe anxiety will prescribe the above medication. Side effects reviewed. Patient to call with any issues. Diverticulosis  Patient to follow-up with GI. No recent issues. Reports looser more frequent stools. Class 3 severe obesity due to excess calories with serious comorbidity and body mass index (BMI) of 40.0 to 44.9 in adult [facility])  No recent or significant change in weight. Lifestyle modifications advised. Patient to follow-up with weight loss specialist.    Urinary incontinence, unspecified type  Slightly improved with the increase of her oxybutynin. Patient to follow-up with OB/GYN for this issue. Patient to follow-up in approximately 4 to 6 weeks for above issues. Call in 2 weeks to update us on how she is doing. We will need labs at next appointment. Return in about 5 weeks (around 12/22/2021) for Follow-up Appointment From Today's Visit. An [employer] was used to authenticate this note.     --Armand Andersen MD on 11/17/21 at 2:54 PM EST

## 2021-12-15 ENCOUNTER — OFFICE VISIT (OUTPATIENT)
Dept: PRIMARY CARE CLINIC | Age: 41
End: 2021-12-15
Payer: MEDICAID

## 2021-12-15 VITALS
WEIGHT: 223 LBS | RESPIRATION RATE: 20 BRPM | OXYGEN SATURATION: 98 % | DIASTOLIC BLOOD PRESSURE: 88 MMHG | BODY MASS INDEX: 41.04 KG/M2 | HEIGHT: 62 IN | SYSTOLIC BLOOD PRESSURE: 128 MMHG | TEMPERATURE: 97.7 F | HEART RATE: 107 BPM

## 2021-12-15 DIAGNOSIS — G47.00 INSOMNIA, UNSPECIFIED TYPE: ICD-10-CM

## 2021-12-15 DIAGNOSIS — E66.01 CLASS 3 SEVERE OBESITY DUE TO EXCESS CALORIES WITH SERIOUS COMORBIDITY AND BODY MASS INDEX (BMI) OF 40.0 TO 44.9 IN ADULT (HCC): ICD-10-CM

## 2021-12-15 DIAGNOSIS — F41.9 ANXIETY: ICD-10-CM

## 2021-12-15 DIAGNOSIS — I10 PRIMARY HYPERTENSION: Primary | ICD-10-CM

## 2021-12-15 PROCEDURE — G8484 FLU IMMUNIZE NO ADMIN: HCPCS | Performed by: FAMILY MEDICINE

## 2021-12-15 PROCEDURE — 99214 OFFICE O/P EST MOD 30 MIN: CPT | Performed by: FAMILY MEDICINE

## 2021-12-15 PROCEDURE — 4004F PT TOBACCO SCREEN RCVD TLK: CPT | Performed by: FAMILY MEDICINE

## 2021-12-15 PROCEDURE — G8417 CALC BMI ABV UP PARAM F/U: HCPCS | Performed by: FAMILY MEDICINE

## 2021-12-15 PROCEDURE — G8427 DOCREV CUR MEDS BY ELIG CLIN: HCPCS | Performed by: FAMILY MEDICINE

## 2021-12-15 RX ORDER — HYDROXYZINE HYDROCHLORIDE 10 MG/1
20 TABLET, FILM COATED ORAL 3 TIMES DAILY PRN
Qty: 90 TABLET | Refills: 1 | Status: SHIPPED | OUTPATIENT
Start: 2021-12-15 | End: 2022-01-14

## 2021-12-15 ASSESSMENT — ENCOUNTER SYMPTOMS
SORE THROAT: 0
NAUSEA: 0
CONSTIPATION: 0
DIARRHEA: 0
WHEEZING: 0
RHINORRHEA: 0
ABDOMINAL PAIN: 0
SHORTNESS OF BREATH: 0
VOMITING: 0

## 2021-12-15 NOTE — PROGRESS NOTES
12/15/2021     Chief Complaint   Patient presents with    Hypertension     HPI  Ria Martinez (:  1980) is a 46 y.o. female, here for evaluation of the following medical concerns:    Patient is a 28-year-old female with a past medical history of primary hypertension and anxiety along with diverticulosis and obesity, urinary incontinence who presents today to follow-up these chronic issues. At the patient's last office appointment her blood pressure was elevated and she was started on losartan 50 mg daily. In regards to the patient's anxiety she was prescribed hydroxyzine. Patient has only been taking half dose of her losartan. Patient is not regularly taking metoprolol. Blood pressure today is well controlled. Patient is using hydroxyzine as prescribed at night mainly. Patient reports that her insomnia is much improved and her anxiety is also controlled on this medication. At times will take a dose during the day. 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 Patient denies any SI/HI. Reports she has made lifestyle modifications but has been unable to lose any weight. Review of Systems   Constitutional: Negative for chills and fever. HENT: Negative for congestion, rhinorrhea and sore throat. Respiratory: Negative for shortness of breath and wheezing. Cardiovascular: Negative for chest pain and leg swelling. Gastrointestinal: Negative for abdominal pain, constipation, diarrhea, nausea and vomiting. Skin: Negative for rash. Neurological: Negative for light-headedness and headaches. Psychiatric/Behavioral: Negative for sleep disturbance and suicidal ideas. The patient is nervous/anxious.         Past Medical History:   Diagnosis Date    Abnormal Pap smear     Abnormal Pap smear of cervix     Abscess     Abscess     on legs in past, last on 10/1/16 no current abscess, no drainage    Anesthesia     pt wakes up \" freaking out \" per pt    Cancer [facility])     cervical    Cervical cancer ([address]     Gestational diabetes mellitus, antepartum 2012    no current issues, diet controlled    Hypotension        Prior to Visit Medications    Medication Sig Taking? Authorizing Provider   hydrOXYzine (ATARAX) 10 MG tablet Take 2 tablets by mouth 3 times daily as needed for Anxiety Yes Leighton Saldana MD   losartan (COZAAR) 50 MG tablet Take 1 tablet by mouth daily  Patient taking differently: Take 25 mg by mouth nightly  Yes Leighton Saldana MD   oxybutynin (DITROPAN XL) 10 MG extended release tablet Take 1 tablet by mouth daily Yes Gevena Mention, DO   metoprolol succinate (TOPROL XL) 50 MG extended release tablet Take 1 tablet by mouth every evening Yes Silas Conklin, DO   oxybutynin (DITROPAN XL) 5 MG extended release tablet Take 1 tablet by mouth daily Yes Gevena Mention, DO        Allergies   Allergen Reactions    Pcn [Penicillins] Shortness Of Breath and Swelling     Yeast infection      Cephalexin      Yeast infection       Social History     Tobacco Use    Smoking status: Current Every Day Smoker     Packs/day: 1.00     Years: 15.00     Pack years: 15.00     Types: Cigarettes     Last attempt to quit: 11/15/2014     Years since quittin.0    Smokeless tobacco: Never Used   Substance Use Topics    Alcohol use: Yes     Alcohol/week: 0.0 standard drinks     Comment: rare           Vitals:    12/15/21 1435   BP: 128/88   Pulse: 107   Resp: 20   Temp: 97.7 °F (36.5 °C)   TempSrc: Temporal   SpO2: 98%   Weight: 223 lb (101.2 kg)   Height: 5' 2\" (1.575 m)     Estimated body mass index is 40.79 kg/m² as calculated from the following:    Height as of this encounter: 5' 2\" (1.575 m). Weight as of this encounter: 223 lb (101.2 kg). Physical Exam  Constitutional:       Appearance: She is well-developed. HENT:      Head: Normocephalic. Eyes:      Extraocular Movements: Extraocular movements intact. Conjunctiva/sclera: Conjunctivae normal.   Cardiovascular:      Rate and Rhythm: Normal rate and regular rhythm. Heart sounds: Normal heart sounds. No murmur heard. Pulmonary:      Effort: Pulmonary effort is normal.      Breath sounds: Normal breath sounds. No wheezing or rales. Abdominal:      General: Bowel sounds are normal.      Palpations: Abdomen is soft. Tenderness: There is no abdominal tenderness. Musculoskeletal:      Right lower leg: No edema. Left lower leg: No edema. Neurological:      General: No focal deficit present. Mental Status: She is alert. Comments: Cranial nerves grossly intact   Psychiatric:         Mood and Affect: Mood normal.         Judgment: Judgment normal.         ASSESSMENT/PLAN:  Molly Carson was seen today for hypertension. Diagnoses and all orders for this visit:    Primary hypertension  -     BASIC METABOLIC PANEL; Future  -     CBC Auto Differential; Future  Patient is to continue 25 mg losartan daily. Increase to 50 mg daily if blood pressure is above 140/90 consistently. Patient may hold metoprolol or continue based on heart rate and blood pressure. Patient to call with changes in medications and her blood pressure every few weeks. Check labs. Anxiety  -     hydrOXYzine (ATARAX) 10 MG tablet; Take 2 tablets by mouth 3 times daily as needed for Anxiety  Stable. Patient has been undergoing additional stress recently due to being a care provider for someone on hospice. No SI/HI. Refill hydroxyzine. Tolerating well. Not interested in any additional medications at this time. Class 3 severe obesity due to excess calories with serious comorbidity and body mass index (BMI) of 40.0 to 44.9 in [facility])  Lifestyle modifications reviewed and advised. Patient has not lost any significant bout of weight since her last appointment.   Patient to follow-up with weight loss specialist.    Insomnia, unspecified type  -     hydrOXYzine (ATARAX) 10 MG tablet; Take 2 tablets by mouth 3 times daily as needed for Anxiety  Stable. Improved. Secondary to anxiety. Return in about 3 months (around 3/15/2022). An [employer] Coupleignature was used to authenticate this note.     --Sarahi Sim MD on 12/15/21 at 1:49 PM EST

## 2021-12-27 ENCOUNTER — OFFICE VISIT (OUTPATIENT)
Dept: BARIATRICS/WEIGHT MGMT | Age: 41
End: 2021-12-27
Payer: MEDICAID

## 2021-12-27 VITALS
DIASTOLIC BLOOD PRESSURE: 104 MMHG | HEIGHT: 63 IN | TEMPERATURE: 98.1 F | WEIGHT: 225.6 LBS | HEART RATE: 80 BPM | SYSTOLIC BLOOD PRESSURE: 149 MMHG | BODY MASS INDEX: 39.97 KG/M2

## 2021-12-27 DIAGNOSIS — E27.8 ADRENAL NODULE (HCC): ICD-10-CM

## 2021-12-27 DIAGNOSIS — I10 PRIMARY HYPERTENSION: Primary | ICD-10-CM

## 2021-12-27 DIAGNOSIS — E66.9 OBESITY (BMI 30-39.9): ICD-10-CM

## 2021-12-27 PROBLEM — E27.9 ADRENAL NODULE (HCC): Status: ACTIVE | Noted: 2021-12-27

## 2021-12-27 PROBLEM — E66.812 CLASS 2 OBESITY DUE TO EXCESS CALORIES WITHOUT SERIOUS COMORBIDITY WITH BODY MASS INDEX (BMI) OF 39.0 TO 39.9 IN ADULT: Status: ACTIVE | Noted: 2020-09-16

## 2021-12-27 PROBLEM — E66.09 CLASS 2 OBESITY DUE TO EXCESS CALORIES WITHOUT SERIOUS COMORBIDITY WITH BODY MASS INDEX (BMI) OF 39.0 TO 39.9 IN ADULT: Status: ACTIVE | Noted: 2020-09-16

## 2021-12-27 PROBLEM — N39.3 STRESS INCONTINENCE: Status: ACTIVE | Noted: 2021-12-27

## 2021-12-27 PROCEDURE — 4004F PT TOBACCO SCREEN RCVD TLK: CPT | Performed by: INTERNAL MEDICINE

## 2021-12-27 PROCEDURE — G8428 CUR MEDS NOT DOCUMENT: HCPCS | Performed by: INTERNAL MEDICINE

## 2021-12-27 PROCEDURE — 99202 OFFICE O/P NEW SF 15 MIN: CPT

## 2021-12-27 PROCEDURE — G8484 FLU IMMUNIZE NO ADMIN: HCPCS | Performed by: INTERNAL MEDICINE

## 2021-12-27 PROCEDURE — G8417 CALC BMI ABV UP PARAM F/U: HCPCS | Performed by: INTERNAL MEDICINE

## 2021-12-27 PROCEDURE — 99205 OFFICE O/P NEW HI 60 MIN: CPT | Performed by: INTERNAL MEDICINE

## 2021-12-27 RX ORDER — VENLAFAXINE HYDROCHLORIDE 37.5 MG/1
37.5 CAPSULE, EXTENDED RELEASE ORAL DAILY
Qty: 90 CAPSULE | Refills: 1 | Status: SHIPPED
Start: 2021-12-27 | End: 2022-01-31 | Stop reason: DRUGHIGH

## 2021-12-27 RX ORDER — OXYBUTYNIN CHLORIDE 10 MG/1
10 TABLET, EXTENDED RELEASE ORAL DAILY
COMMUNITY

## 2021-12-27 NOTE — PATIENT INSTRUCTIONS
Breakfast -     one high protein shake                            + 20 grams of fiber. Do this by either eating 12 tablespoons of the original, plain Fiber One cereal every day or 4 tablespoons of wheat dextrin powder (Benefiber or a generic brand) every day. Work up to this amount slowly by starting with only one-eighth to one-fourth of the target amount and then adding another one-eighth to one-fourth every one or two weeks until reaching the target. Lunch -           one high protein shake                           + one a fat snack item     Dinner -          one frozen meal                           + one snack item     Shake options (<200 khai, >25 grams/protein) :  Nectar, Pure Protein, Premier, Boost Max, Ensure Max, BeneProtein and [employer] (which is lactose-free) are milk-based options; Nectar, Premier Protein Clear, IsoPure Protein Drink, and Protein 2 O are water-based options; (Premier Protein Clear, the water-based option, comes in a 20 oz bottle with 20 grams of protein and 90 calories. So you have to drink three each day which increases the cost.)  (Disclaimer: Dietary supplements rarely have their listed ingredients and the amount of each verified by a third party other. Sometimes they give verification for their claims to be GMO and gluten free and to be organic.  However, even such verifications as these may still be untrustworthy.)     Fat snack options (<150 khai, >11 grams of fat): 22 almonds, 1 1/2 tablespoon of a oil-based dressing or 4 tablespoons of [place] dressing on a bed of salad greens, 1 1/2 tablespoons of peanut butter     Snack options (<100 khai, no sweets): fruit, low fat/high protein [place] yogurt, mozzarella cheese stick, nuts, salad with dressing, peanut butter, chips/crackers/pretzels     Frozen meal options (<350 khai):  Weight Watchers Smart Ones, Lean Cuisine, Healthy Choice, Anai's, Viridiana's     Food substitutes for the shakes:  4 oz of baked, grilled or broiled chicken, turkey or fish, 10 egg whites     Take a multivitamin daily     Walk 30 min every day    Stop Ditropan     Start taking venlafaxine (Effexor) XR 37.5 mg, one tablet every day.  If after 7d the appetite is not suppressed, then increase to two tablets daily    Check the BP 2-3x/day and send the readings to me every day by My Chart    Have the ordered blood work performed at 8:00 am

## 2021-12-27 NOTE — PROGRESS NOTES
Refill    oxybutynin (DITROPAN-XL) 10 MG extended release tablet Take 10 mg by mouth daily      hydrOXYzine (ATARAX) 10 MG tablet Take 2 tablets by mouth 3 times daily as needed for Anxiety 90 tablet 1    losartan (COZAAR) 50 MG tablet Take 1 tablet by mouth daily (Patient taking differently: Take 25 mg by mouth nightly ) 30 tablet 1    metoprolol succinate (TOPROL XL) 50 MG extended release tablet Take 1 tablet by mouth every evening 30 tablet 3     No current facility-administered medications for this visit. ROS -  Card - no CP  GI - no N/V/D/C    PE -  Gen : BP (!) 149/104 (Site: Left Upper Arm, Position: Sitting, Cuff Size: Large Adult)   Pulse 80   Temp 98.1 °F (36.7 °C) (Temporal)   Ht 5' 3.25\" (1.607 m)   Wt 225 lb 9.6 oz (102.3 kg)   LMP 08/31/2017 (Exact Date)   BMI 39.65 kg/m²    WN, WD, NAD  Heart:  RRR w/o MGR, no carotid bruits, + LE edema b/l  Lung: Nml resp effort, CTA b/l  Psych: Normal mood   Full affect  Neuro: Moves all ext well  ______________________    HISTORY & ASSESSMENT/PLAN -     Problem 1  - HTN   HPI   - Diagnosed 10/2021       Home BP readings: 140's-150's/90's      Obesity is worsening her BP conts      Regimen: Cozaar 50 mg, one-half tablet daily, Metoprolol XL 50 mg daily      First noted to have elevated BP 10/11/21. This occurred after having a 5d course of prednisone 9/16 - 9/21 and a few days before beginning oxybutynin 10/18/21      Both of these drugs can cause HTN.  Will therefore stop the oxybutynin and observe (she states it is not helping her stress incontinence anyway)      No episodes of flushing or sweating  Assessment  - Uncontrolled  Plan   - Stop oxybutynin      Monitor BP 2-3x/day      Text me the readings every night for the next week    Problem 2  - Obesity   HPI   - See above Background for description    Weight  Date    225.6 lbs 12/27/21  DEN = 1985 khai/d = 13,895 khai/wk  Wt effect of HR foods = [employer] 375 khai/wk + Sweets 420 + SSB 3,640 = 4,435 khai/wk = 633 khai/d = 32% DEN = 63 lbs/yr  Opted for a VLCD for its simplicity  Venlafaxine trialed as an appetite suppressant to hopefully also treat her anxiety  Assessment  - Uncontrolled  Plan   -   Patient Instructions     Breakfast -     one high protein shake                            + 20 grams of fiber. Do this by either eating 12 tablespoons of the original, plain Fiber One cereal every day or 4 tablespoons of wheat dextrin powder (Benefiber or a generic brand) every day. Work up to this amount slowly by starting with only one-eighth to one-fourth of the target amount and then adding another one-eighth to one-fourth every one or two weeks until reaching the target. Lunch -           one high protein shake                           + one a fat snack item     Dinner -          one frozen meal                           + one snack item     Shake options (<200 khai, >25 grams/protein) :  Nectar, Pure Protein, Premier, Boost Max, Ensure Max, BeneProtein and [employer] (which is lactose-free) are milk-based options; Nectar, Premier Protein Clear, IsoPure Protein Drink, and Protein 2 O are water-based options; (Premier Protein Clear, the water-based option, comes in a 20 oz bottle with 20 grams of protein and 90 calories. So you have to drink three each day which increases the cost.)  (Disclaimer: Dietary supplements rarely have their listed ingredients and the amount of each verified by a third party other. Sometimes they give verification for their claims to be GMO and gluten free and to be organic.  However, even such verifications as these may still be untrustworthy.)     Fat snack options (<150 khai, >11 grams of fat): 22 almonds, 1 1/2 tablespoon of a oil-based dressing or 4 tablespoons of [place] dressing on a bed of salad greens, 1 1/2 tablespoons of peanut butter     Snack options (<100 khai, no sweets): fruit, low fat/high protein [place] yogurt, mozzarella cheese stick, nuts, salad with dressing, peanut butter, chips/crackers/pretzels     Frozen meal options (<350 khai):  Weight Watchers Smart Ones, [employer] Cuisine, Healthy Choice, Anai's, Viridiana's     Food substitutes for the shakes:  4 oz of baked, grilled or broiled chicken, turkey or fish, 10 egg whites     Take a multivitamin daily     Walk 30 min every day    Start taking venlafaxine (Effexor) XR 37.5 mg, one tablet every day. If after 7d the appetite is not suppressed, then increase to two tablets daily    3. Other - New onset severe htn; rule drug effect and PHA. Check the BP 2-3x/day and send the readings to me every day by My Chart.  Have the ordered blood work performed at 8:00 am    Total time spent on encounter: 65 min    Lisa Pelayo MD  Endocrinology/Obesity  12/27/21

## 2021-12-28 DIAGNOSIS — I10 PRIMARY HYPERTENSION: ICD-10-CM

## 2021-12-28 LAB — CORTISOL TOTAL: 18.36 MCG/DL (ref 2.68–18.4)

## 2021-12-31 LAB
ADRENOCORTICOTROPIC HORMONE: 55.9 PG/ML (ref 7.2–63.3)
ALDOSTERONE: 7.2 NG/DL

## 2022-01-01 LAB — RENIN ACTIVITY: 2.2 NG/ML/HR

## 2022-01-11 DIAGNOSIS — I10 PRIMARY HYPERTENSION: ICD-10-CM

## 2022-01-12 RX ORDER — LOSARTAN POTASSIUM 50 MG/1
25 TABLET ORAL NIGHTLY
Qty: 60 TABLET | Refills: 1 | Status: SHIPPED | OUTPATIENT
Start: 2022-01-12

## 2022-01-31 ENCOUNTER — OFFICE VISIT (OUTPATIENT)
Dept: BARIATRICS/WEIGHT MGMT | Age: 42
End: 2022-01-31
Payer: MEDICAID

## 2022-01-31 VITALS
HEIGHT: 63 IN | SYSTOLIC BLOOD PRESSURE: 159 MMHG | HEART RATE: 83 BPM | WEIGHT: 211.8 LBS | TEMPERATURE: 97.7 F | BODY MASS INDEX: 37.53 KG/M2 | DIASTOLIC BLOOD PRESSURE: 93 MMHG

## 2022-01-31 DIAGNOSIS — E66.9 OBESITY (BMI 30-39.9): ICD-10-CM

## 2022-01-31 DIAGNOSIS — F41.9 ANXIETY: ICD-10-CM

## 2022-01-31 DIAGNOSIS — I10 PRIMARY HYPERTENSION: Primary | ICD-10-CM

## 2022-01-31 PROCEDURE — 99214 OFFICE O/P EST MOD 30 MIN: CPT | Performed by: INTERNAL MEDICINE

## 2022-01-31 PROCEDURE — 4004F PT TOBACCO SCREEN RCVD TLK: CPT | Performed by: INTERNAL MEDICINE

## 2022-01-31 PROCEDURE — 99211 OFF/OP EST MAY X REQ PHY/QHP: CPT | Performed by: INTERNAL MEDICINE

## 2022-01-31 PROCEDURE — G8417 CALC BMI ABV UP PARAM F/U: HCPCS | Performed by: INTERNAL MEDICINE

## 2022-01-31 PROCEDURE — G8428 CUR MEDS NOT DOCUMENT: HCPCS | Performed by: INTERNAL MEDICINE

## 2022-01-31 PROCEDURE — G8484 FLU IMMUNIZE NO ADMIN: HCPCS | Performed by: INTERNAL MEDICINE

## 2022-01-31 RX ORDER — VENLAFAXINE HYDROCHLORIDE 75 MG/1
CAPSULE, EXTENDED RELEASE ORAL
Qty: 60 CAPSULE | Refills: 2 | Status: SHIPPED
Start: 2022-01-31 | End: 2022-04-19 | Stop reason: SDUPTHER

## 2022-01-31 NOTE — PROGRESS NOTES
CC -   HTN, Obesity    BACKGROUND -   Last visit: 12/27/21  First visit: 12/27/21     Obesity   Began in childhood  Initial BMI 40.0, Wt 225.6 lbs, Ht 5' 3.5\"  HS Grad wt 175 lbs   Lowest   wt 142 lbs (age 24; due to a very stressful pregnancy and very hectic post-partum lifestyle)  Highest  wt 248 lbs  Pattern of wt gain: grad with a rapid gain after quitting smoking  Wt change past yr: +10 lbs  Most wt lost: 30 lbs (liquid MR + eating less/right)  Other diets attempted: Keto, Large quantity of water, Calorie counting    Desire to lose weight: 10/10  Prob posed by appetite: 7/10    Initial Diet:    Number of meals per day - 1    Number of snacks per day - 4    Meal volume - 12\" plate, sometimes seconds    Fast food/convenience store - 1-2x/week    Restaurants (not fast food) - 1x/week   Sweets - 2d/week (1 reg sized [employer])   Chips - 1d/week   Crackers/pretzels - 0d/week   Nuts - 0d/week   Peanut Butter - 0d/week   Popcorn - 0d/week   Dried fruit - 0d/week   Whole fruit - 3-5d/week (1 serving)   Breakfast cereal - 1-2d/week (Honey Bunches of Oats, Strawberry Oatmeal)   Granola/Protein/Energy bar - 0d/week   Sugar sweetened beverages - 12 oz reg soda/day, 2 cups coffee/day each with 4 Tbsps flavored creamer + 1 tablespoon sugar, 4 oz of fruit juice/wk   Protein - No supplements   Fiber - No supplements     Exercise:    Gym membership - [employer]    Walking - none    Running - none    Resistance - none    Aerobic class - none    ______________________    [facility] -  Past Medical History:   Diagnosis Date    Abnormal Pap smear     Abscess     on legs in past, last on 10/1/16 no current abscess, no drainage    Adrenal nodule ([facility])     Anesthesia     pt wakes up \" freaking out \" per pt    Cervical cancer ([address]     Class 2 obesity due to excess calories without serious comorbidity with body mass index (BMI) of 39.0 to 39.9 in adult     Gestational diabetes mellitus, antepartum 12/26/2012    no current issues, diet controlled    Primary hypertension     Stress incontinence      Current Outpatient Medications   Medication Sig Dispense Refill    losartan (COZAAR) 50 MG tablet Take 0.5 tablets by mouth nightly 60 tablet 1    oxybutynin (DITROPAN-XL) 10 MG extended release tablet Take 10 mg by mouth daily      venlafaxine (EFFEXOR XR) 37.5 MG extended release capsule Take 1 capsule by mouth daily 90 capsule 1    metoprolol succinate (TOPROL XL) 50 MG extended release tablet Take 1 tablet by mouth every evening 30 tablet 3     No current facility-administered medications for this visit. ROS -  Card - no CP  GI - no N/V/D/C    PE -  Gen : BP (!) 159/93 (Site: Left Upper Arm, Position: Sitting, Cuff Size: Large Adult)   Pulse 83   Temp 97.7 °F (36.5 °C) (Temporal)   Ht 5' 3.25\" (1.607 m)   Wt 211 lb 12.8 oz (96.1 kg)   LMP 08/31/2017 (Exact Date)   BMI 37.22 kg/m²    WN, WD, NAD  Heart:  RRR w/o MGR, no carotid bruits, + LE edema b/l  Lung: Nml resp effort, CTA b/l  Psych: Normal mood   Full affect  Neuro: Moves all ext well  ______________________    HISTORY & ASSESSMENT/PLAN -     Problem 1  - HTN   HPI   - Diagnosed 10/2021       Home BP readings: 110-120's/70-80's      Regimen: Cozaar 50 mg, one-half tablet daily, Metoprolol XL 50 mg daily      First noted to have elevated BP 10/11/21. This occurred after having a 5d course of prednisone 9/16 - 9/21 and a few days before beginning oxybutynin 10/18/21      Both of these drugs can cause HTN.  I therefore stopped the oxybutynin; (it was not helping her stress incontinence; once stopping it, it did not worsen)  Assessment  - Improved; the improvement may be from her dietary changes instead of stopping oxybutynin  Plan   -   Cont with present regimen:  Cozaar 50 mg, one-half tablet daily, Metoprolol XL 50 mg daily  Cont with wt reduction per the plan below    Problem 2  - Obesity   HPI   - See above Background for description    Weight  Date    225.6 lbs 12/27/21 (started her diet 1/1/22 home 227.2 lbs)    211.8 lbs 01/31/22 (home 211.8 lbs)  Total wt loss to date: 13.8 lbs  DEN = 1985 khia/d = 13,895 khai/wk  Avg daily energy variance:   12/27/21 - 01/31/22 = 13.8 lbs(48,300cal)/30d = - 1,610 khai/d deficit  Wt effect of HR foods = [employer] 375 khai/wk + Sweets 420 + SSB 3,640 = 4,435 khai/wk = 633 khai/d = 32% DEN = 63 lbs/yr  Opted for a VLCD for its simplicity  Venlafaxine started as an appetite suppressant with the hope it will also treat her anxiety  Update:   VLCD - 7d/wk  Protein shake - Premier  Fiber - Fiber One cereal, 1/2 cup, with strawberry oatmeal  Meal - LC, 4 oz chicken with a cup of vegetables  Taking venlafaxine XR 37.5 mg, two tablets every morning, appetite suppression 10/10 before afternoon, 5/10 afternoon to bed  Assessment  - Improved; cont same plan; moving one of the venlafaxine tablets to the afternoon may help with evening appetite suppression  Plan   -   Patient Instructions     Breakfast -     one high protein shake                            + 20 grams of fiber. Do this by either eating 12 tablespoons of the original, plain Fiber One cereal every day or 4 tablespoons of wheat dextrin powder (Benefiber or a generic brand) every day. Work up to this amount slowly by starting with only one-eighth to one-fourth of the target amount and then adding another one-eighth to one-fourth every one or two weeks until reaching the target. Lunch -           one high protein shake                           + one a fat snack item     Dinner -          one frozen meal                           + one snack item     Shake options (<200 khai, >25 grams/protein) :  Nectar, Pure Protein, Premier, Boost Max, Ensure Max, BeneProtein and [employer] (which is lactose-free) are milk-based options;  Nectar, Premier Protein Clear, IsoPure Protein Drink, and Protein 2 O are water-based options; (Premier Protein Clear, the water-based option, comes in a 20 oz bottle with 20 grams of protein and 90 calories. So you have to drink three each day which increases the cost.)  (Disclaimer: Dietary supplements rarely have their listed ingredients and the amount of each verified by a third party other. Sometimes they give verification for their claims to be GMO and gluten free and to be organic. However, even such verifications as these may still be untrustworthy.)     Fat snack options (<150 khai, >11 grams of fat): 22 almonds, 1 1/2 tablespoon of a oil-based dressing or 4 tablespoons of [place] dressing on a bed of salad greens, 1 1/2 tablespoons of peanut butter     Snack options (<100 khai, no sweets): fruit, low fat/high protein [place] yogurt, mozzarella cheese stick, nuts, salad with dressing, peanut butter, chips/crackers/pretzels     Frozen meal options (<350 khai):  Weight Watchers Smart Ones, [employer] Cuisine, Healthy Choice, Anai's, Viridiana's     Food substitutes for the shakes:  4 oz of baked, grilled or broiled chicken, turkey or fish, 10 egg whites     Take a multivitamin daily     Walk 30 min every day    Trial taking Effexor XR 37.5 mg, one tablet twice daily for 3 days; if no improvement in appetites suppression, then Increase to a 75mg tablet, one tablet twice daily.     3. Other - New onset severe htn; labs ordered to r/o PHA; all were nml    Medication management : venlafaxine    Joana Ott MD  Endocrinology/Obesity  1/31/22

## 2022-02-06 NOTE — PATIENT INSTRUCTIONS
Breakfast -     one high protein shake                            + 20 grams of fiber. Do this by either eating 12 tablespoons of the original, plain Fiber One cereal every day or 4 tablespoons of wheat dextrin powder (Benefiber or a generic brand) every day. Work up to this amount slowly by starting with only one-eighth to one-fourth of the target amount and then adding another one-eighth to one-fourth every one or two weeks until reaching the target. Lunch -           one high protein shake                           + one a fat snack item     Dinner -          one frozen meal                           + one snack item     Shake options (<200 khai, >25 grams/protein) :  Nectar, Pure Protein, Premier, Boost Max, Ensure Max, BeneProtein and [employer] (which is lactose-free) are milk-based options; Nectar, Premier Protein Clear, IsoPure Protein Drink, and Protein 2 O are water-based options; (Premier Protein Clear, the water-based option, comes in a 20 oz bottle with 20 grams of protein and 90 calories. So you have to drink three each day which increases the cost.)  (Disclaimer: Dietary supplements rarely have their listed ingredients and the amount of each verified by a third party other. Sometimes they give verification for their claims to be GMO and gluten free and to be organic.  However, even such verifications as these may still be untrustworthy.)     Fat snack options (<150 khai, >11 grams of fat): 22 almonds, 1 1/2 tablespoon of a oil-based dressing or 4 tablespoons of [place] dressing on a bed of salad greens, 1 1/2 tablespoons of peanut butter     Snack options (<100 khai, no sweets): fruit, low fat/high protein [place] yogurt, mozzarella cheese stick, nuts, salad with dressing, peanut butter, chips/crackers/pretzels     Frozen meal options (<350 khai):  Weight Watchers Smart Ones, Lean Cuisine, Healthy Choice, Anai's, Viridiana's     Food substitutes for the shakes:  4 oz of baked, grilled or broiled chicken, turkey or fish, 10 egg whites     Take a multivitamin daily     Walk 30 min every day    Trial taking Effexor XR 37.5 mg, one tablet twice daily for 3 days; if no improvement in appetites suppression, then Increase to a 75mg tablet, one tablet twice daily.

## 2022-02-16 ENCOUNTER — NURSE TRIAGE (OUTPATIENT)
Dept: OTHER | Facility: CLINIC | Age: 42
End: 2022-02-16

## 2022-02-16 NOTE — TELEPHONE ENCOUNTER
Received call from Jonathan Merrill at [facility] with [employer]. Subjective: Caller states \"For 2.5 weeks when I cough or clear my throat I get pain on  right hand side of abdomen out of nowhere. It burns for 1 minute then goes away. \"     Current Symptoms: Right sided abdominal pain, lump/knot on abdomen size of nickel    Onset: 2.5 weeks     Associated Symptoms: NA    Pain Severity: 6/10; burning, stabbing; intermittent    Temperature: Denies fever and feeling feverish/chills     What has been tried: Nothing     LMP: Does not have uterus Pregnant: No    Recommended disposition: See PCP within 24 hours. Advised caller if unable to get an appointment in the suggested time frame to go to an [facility] or walk-in clinic, caller agreeable. Care advice provided, patient verbalizes understanding; denies any other questions or concerns; instructed to call back for any new or worsening symptoms. Patient/Caller agrees with recommended disposition; writer provided warm transfer to [employer] at [facility] for appointment scheduling. Attention Provider: Thank you for allowing me to participate in the care of your patient. The patient was connected to triage in response to information provided to the ECC/PSC. Please do not respond through this encounter as the response is not directed to a shared pool.     Reason for Disposition   [1] MODERATE pain (e.g., interferes with normal activities) AND [2] pain comes and goes (cramps) AND [3] present > 24 hours  (Exception: pain with Vomiting or Diarrhea - see that Guideline)    Protocols used: ABDOMINAL PAIN - [facility]

## 2022-02-17 ENCOUNTER — OFFICE VISIT (OUTPATIENT)
Dept: PRIMARY CARE CLINIC | Age: 42
End: 2022-02-17
Payer: MEDICAID

## 2022-02-17 VITALS
TEMPERATURE: 97.5 F | OXYGEN SATURATION: 96 % | BODY MASS INDEX: 35.97 KG/M2 | SYSTOLIC BLOOD PRESSURE: 126 MMHG | DIASTOLIC BLOOD PRESSURE: 86 MMHG | RESPIRATION RATE: 20 BRPM | WEIGHT: 203 LBS | HEIGHT: 63 IN | HEART RATE: 105 BPM

## 2022-02-17 DIAGNOSIS — R10.33 PERIUMBILICAL PAIN: Primary | ICD-10-CM

## 2022-02-17 DIAGNOSIS — R05.9 COUGH: ICD-10-CM

## 2022-02-17 PROCEDURE — 4004F PT TOBACCO SCREEN RCVD TLK: CPT | Performed by: FAMILY MEDICINE

## 2022-02-17 PROCEDURE — 99213 OFFICE O/P EST LOW 20 MIN: CPT | Performed by: FAMILY MEDICINE

## 2022-02-17 PROCEDURE — G8417 CALC BMI ABV UP PARAM F/U: HCPCS | Performed by: FAMILY MEDICINE

## 2022-02-17 PROCEDURE — G8484 FLU IMMUNIZE NO ADMIN: HCPCS | Performed by: FAMILY MEDICINE

## 2022-02-17 PROCEDURE — G8427 DOCREV CUR MEDS BY ELIG CLIN: HCPCS | Performed by: FAMILY MEDICINE

## 2022-02-17 ASSESSMENT — ENCOUNTER SYMPTOMS
WHEEZING: 0
NAUSEA: 0
RHINORRHEA: 0
VOMITING: 0
DIARRHEA: 0
ABDOMINAL PAIN: 1
SORE THROAT: 0
CONSTIPATION: 0
SHORTNESS OF BREATH: 0

## 2022-02-17 NOTE — PROGRESS NOTES
2022     Chief Complaint   Patient presents with    Abdominal Pain     notices a lump, when she coughs feels like someone is taking a hot poker to lower right abdomen x 3 weeks     HPI  Natalee Saleh (:  1980) is a 46 y.o. female, here for evaluation of the following medical concerns:    Patient is a 62-year-old female who presents today for an acute office appointment in regards to right-sided abdominal pain and cough that has been ongoing for approximately 2-1/2 weeks. Patient reports that when she starts to cough or clear her throat she will occasionally get a pain in the right side of her abdomen. This lasted for approximately 1 minute. Pain is more of a burning sensation. It is intermittent stabbing at times. No fevers or chills. Having a BM once a day. Review of Systems   Constitutional: Negative for chills and fever. HENT: Negative for congestion, rhinorrhea and sore throat. Respiratory: Negative for shortness of breath and wheezing. Cardiovascular: Negative for chest pain and leg swelling. Gastrointestinal: Positive for abdominal pain. Negative for constipation, diarrhea, nausea and vomiting. Skin: Negative for rash. Neurological: Negative for light-headedness and headaches. Past Medical History:   Diagnosis Date    Abnormal Pap smear     Abscess     on legs in past, last on 10/1/16 no current abscess, no drainage    Adrenal nodule (HCC)     Anesthesia     pt wakes up \" freaking out \" per pt    Cervical cancer ([facility] [address]     Class 2 obesity due to excess calories without serious comorbidity with body mass index (BMI) of 39.0 to 39.9 in adult     Gestational diabetes mellitus, antepartum 2012    no current issues, diet controlled    Primary hypertension     Stress incontinence        Prior to Visit Medications    Medication Sig Taking?  Authorizing Provider   venlafaxine (EFFEXOR XR) 75 MG extended release capsule Take one capsule twice daily Yes Rosalina Hernandez Momo Grey MD   losartan (COZAAR) 50 MG tablet Take 0.5 tablets by mouth nightly Yes Ruth Barboza MD   oxybutynin (DITROPAN-XL) 10 MG extended release tablet Take 10 mg by mouth daily Yes Historical Provider, MD   metoprolol succinate (TOPROL XL) 50 MG extended release tablet Take 1 tablet by mouth every evening Yes Silas Conklin, DO        Allergies   Allergen Reactions    Pcn [Penicillins] Shortness Of Breath and Swelling     Yeast infection      Cephalexin      Yeast infection       Social History     Tobacco Use    Smoking status: Current Every Day Smoker     Packs/day: 1.00     Years: 15.00     Pack years: 15.00     Types: Cigarettes    Smokeless tobacco: Never Used   Substance Use Topics    Alcohol use: Yes     Alcohol/week: 0.0 standard drinks     Comment: rare           Vitals:    02/17/22 1349   BP: 126/86   Pulse: 105   Resp: 20   Temp: 97.5 °F (36.4 °C)   TempSrc: Temporal   SpO2: 96%   Weight: 203 lb (92.1 kg)   Height: 5' 3.25\" (1.607 m)     Estimated body mass index is 35.68 kg/m² as calculated from the following:    Height as of this encounter: 5' 3.25\" (1.607 m). Weight as of this encounter: 203 lb (92.1 kg). Physical Exam  Constitutional:       Appearance: She is well-developed. HENT:      Head: Normocephalic. Eyes:      Extraocular Movements: Extraocular movements intact. Conjunctiva/sclera: Conjunctivae normal.   Cardiovascular:      Rate and Rhythm: Normal rate and regular rhythm. Heart sounds: Normal heart sounds. No murmur heard. Pulmonary:      Effort: Pulmonary effort is normal.      Breath sounds: Normal breath sounds. No wheezing or rales. Abdominal:      General: Bowel sounds are normal.      Palpations: Abdomen is soft. Comments: Quarter sized lump/mass as noted. Only palpable when standing. Neurological:      General: No focal deficit present. Mental Status: She is alert.       Comments: Cranial nerves grossly intact   Psychiatric: Mood and Affect: Mood normal.         Judgment: Judgment normal.         ASSESSMENT/PLAN:  Yola Reed was seen today for abdominal pain. Diagnoses and all orders for this visit:    Periumbilical pain  -     US SOFT TISSUE LIMITED AREA; Future    Cough    Small palpable mass on exam.  This is the area concerning for pain during coughing. Concerns for possible lipoma or other soft tissue mass. Will check ultrasound. Patient does have a history of diverticulosis. If ultrasound is normal will order CT scan of abdomen and pelvis. To ER with new or worsening symptoms. Return if symptoms worsen or fail to improve. An [employer] was used to authenticate this note.     --Jewel Conte MD on 2/17/22 at 8:58 AM EST

## 2022-02-18 DIAGNOSIS — I10 PRIMARY HYPERTENSION: ICD-10-CM

## 2022-02-18 RX ORDER — METOPROLOL SUCCINATE 50 MG/1
50 TABLET, EXTENDED RELEASE ORAL EVERY EVENING
Qty: 30 TABLET | Refills: 3 | Status: SHIPPED
Start: 2022-02-18 | End: 2022-06-22

## 2022-02-28 DIAGNOSIS — R10.33 PERIUMBILICAL ABDOMINAL PAIN: ICD-10-CM

## 2022-02-28 DIAGNOSIS — R19.05 PERIUMBILICAL MASS: Primary | ICD-10-CM

## 2022-03-15 ENCOUNTER — OFFICE VISIT (OUTPATIENT)
Dept: BARIATRICS/WEIGHT MGMT | Age: 42
End: 2022-03-15
Payer: MEDICAID

## 2022-03-15 VITALS
WEIGHT: 198.2 LBS | TEMPERATURE: 97.9 F | SYSTOLIC BLOOD PRESSURE: 140 MMHG | DIASTOLIC BLOOD PRESSURE: 89 MMHG | BODY MASS INDEX: 35.12 KG/M2 | HEIGHT: 63 IN | HEART RATE: 85 BPM

## 2022-03-15 DIAGNOSIS — G89.29 CHRONIC LEFT-SIDED THORACIC BACK PAIN: Primary | ICD-10-CM

## 2022-03-15 DIAGNOSIS — E66.09 CLASS 2 OBESITY DUE TO EXCESS CALORIES WITHOUT SERIOUS COMORBIDITY WITH BODY MASS INDEX (BMI) OF 39.0 TO 39.9 IN ADULT: ICD-10-CM

## 2022-03-15 DIAGNOSIS — M54.6 CHRONIC LEFT-SIDED THORACIC BACK PAIN: Primary | ICD-10-CM

## 2022-03-15 PROCEDURE — G8417 CALC BMI ABV UP PARAM F/U: HCPCS | Performed by: INTERNAL MEDICINE

## 2022-03-15 PROCEDURE — 99211 OFF/OP EST MAY X REQ PHY/QHP: CPT

## 2022-03-15 PROCEDURE — 4004F PT TOBACCO SCREEN RCVD TLK: CPT | Performed by: INTERNAL MEDICINE

## 2022-03-15 PROCEDURE — G8484 FLU IMMUNIZE NO ADMIN: HCPCS | Performed by: INTERNAL MEDICINE

## 2022-03-15 PROCEDURE — 99215 OFFICE O/P EST HI 40 MIN: CPT | Performed by: INTERNAL MEDICINE

## 2022-03-15 PROCEDURE — G8428 CUR MEDS NOT DOCUMENT: HCPCS | Performed by: INTERNAL MEDICINE

## 2022-03-15 NOTE — PATIENT INSTRUCTIONS
Breakfast -     one high protein shake                            + 20 grams of fiber. Do this by either eating 12 tablespoons of the original, plain Fiber One cereal every day or 4 tablespoons of wheat dextrin powder (Benefiber or a generic brand) every day. Work up to this amount slowly by starting with only one-eighth to one-fourth of the target amount and then adding another one-eighth to one-fourth every one or two weeks until reaching the target. Lunch -           one high protein shake                           + one a fat snack item     Dinner -          one frozen meal                           + one snack item     Shake options (<200 khai, >25 grams/protein) :  Nectar, Pure Protein, Premier, Boost Max, Ensure Max, BeneProtein and [employer] (which is lactose-free) are milk-based options; Nectar, Premier Protein Clear, IsoPure Protein Drink, and Protein 2 O are water-based options; (Premier Protein Clear, the water-based option, comes in a 20 oz bottle with 20 grams of protein and 90 calories. So you have to drink three each day which increases the cost.)  (Disclaimer: Dietary supplements rarely have their listed ingredients and the amount of each verified by a third party other. Sometimes they give verification for their claims to be GMO and gluten free and to be organic.  However, even such verifications as these may still be untrustworthy.)     Fat snack options (<150 khai, >11 grams of fat): 22 almonds, 1 1/2 tablespoon of a oil-based dressing or 4 tablespoons of [place] dressing on a bed of salad greens, 1 1/2 tablespoons of peanut butter     Snack options (<100 khai, no sweets): fruit, low fat/high protein [place] yogurt, mozzarella cheese stick, nuts, salad with dressing, peanut butter, chips/crackers/pretzels     Frozen meal options (<350 khai):  Weight Watchers Smart Ones, Lean Cuisine, Healthy Choice, Anai's, Viridiana's     Food substitutes for the shakes:  4 oz of baked, grilled or broiled chicken, turkey or fish, 10 egg whites     Take a multivitamin daily     Walk 30 min every day    Trial taking Effexor XR 75 mg, both tablets at noon

## 2022-03-15 NOTE — PROGRESS NOTES
CC -   HTN, Obesity    BACKGROUND -   Last visit: 01/31/22  First visit: 12/27/21     Obesity   Began in childhood  Initial BMI 40.0, Wt 225.6 lbs, Ht 5' 3.5\"  HS Grad wt 175 lbs   Lowest   wt 142 lbs (age 24; due to a very stressful pregnancy and very hectic post-partum lifestyle)  Highest  wt 248 lbs  Pattern of wt gain: grad with a rapid gain after quitting smoking  Wt change past yr: +10 lbs  Most wt lost: 30 lbs (liquid MR + eating less/right)  Other diets attempted: Keto, Large quantity of water, Calorie counting    Desire to lose weight: 10/10  Prob posed by appetite: 7/10    Initial Diet:    Number of meals per day - 1    Number of snacks per day - 4    Meal volume - 12\" plate, sometimes seconds    Fast food/convenience store - 1-2x/week    Restaurants (not fast food) - 1x/week   Sweets - 2d/week (1 reg sized [employer])   Chips - 1d/week   Crackers/pretzels - 0d/week   Nuts - 0d/week   Peanut Butter - 0d/week   Popcorn - 0d/week   Dried fruit - 0d/week   Whole fruit - 3-5d/week (1 serving)   Breakfast cereal - 1-2d/week (Honey Bunches of Oats, Strawberry Oatmeal)   Granola/Protein/Energy bar - 0d/week   Sugar sweetened beverages - 12 oz reg soda/day, 2 cups coffee/day each with 4 Tbsps flavored creamer + 1 tablespoon sugar, 4 oz of fruit juice/wk   Protein - No supplements   Fiber - No supplements     Exercise:    Gym membership - [employer]    Walking - none    Running - none    Resistance - none    Aerobic class - none    ______________________    [facility] -  Past Medical History:   Diagnosis Date    Abnormal Pap smear     Abscess     on legs in past, last on 10/1/16 no current abscess, no drainage    Adrenal nodule (HCC)     Anesthesia     pt wakes up \" freaking out \" per pt    Cervical cancer ([address]     Class 2 obesity due to excess calories without serious comorbidity with body mass index (BMI) of 39.0 to 39.9 in adult     Gestational diabetes mellitus, antepartum 12/26/2012    no current issues, diet controlled    Primary hypertension     Stress incontinence      Current Outpatient Medications   Medication Sig Dispense Refill    metoprolol succinate (TOPROL XL) 50 MG extended release tablet TAKE 1 TABLET BY MOUTH EVERY EVENING 30 tablet 3    venlafaxine (EFFEXOR XR) 75 MG extended release capsule Take one capsule twice daily 60 capsule 2    losartan (COZAAR) 50 MG tablet Take 0.5 tablets by mouth nightly 60 tablet 1    oxybutynin (DITROPAN-XL) 10 MG extended release tablet Take 10 mg by mouth daily       No current facility-administered medications for this visit. PE -  Gen : BP (!) 140/89 (Site: Left Upper Arm, Position: Sitting, Cuff Size: Large Adult)   Pulse 85   Temp 97.9 °F (36.6 °C) (Temporal)   Ht 5' 3.25\" (1.607 m)   Wt 198 lb 3.2 oz (89.9 kg)   LMP 08/31/2017 (Exact Date)   BMI 34.83 kg/m²    WN, WD, NAD  Heart:  RRR w/o MGR, no carotid bruits, + LE edema b/l  Lung: Nml resp effort, CTA b/l  Psych: Normal mood   Full affect  Neuro: Moves all ext well  ______________________    HISTORY & ASSESSMENT/PLAN -     Problem 1  - HTN   HPI   - Diagnosed 10/2021       Home BP readings: 120's/70's      Regimen: Off medications      First noted to have elevated BP 10/11/21. This occurred after having a 5d course of prednisone 9/16 - 9/21 and a few days before beginning oxybutynin 10/18/21      Both of these drugs can cause HTN.  I therefore stopped the oxybutynin; (it was not helping her stress incontinence; once stopping it, it did not worsen)      The improvement may be from her dietary changes instead of stopping oxybutynin  Assessment  - Improved  Plan   -   Cont with wt reduction per the plan below    Problem 2  - Obesity   HPI   - See above Background for description    Weight  Date    225.6 lbs 12/27/21 (started her diet 1/1/22 home 227.2 lbs)    211.8 lbs 01/31/22 home 211.8 lbs    198.2 lbs 03/15/22 home 197.2 lbs  Total wt loss to date: 27.4 lbs  DEN = 1985 khai/d = 70,773 khai/wk  Avg daily energy variance:   12/27/21 - 01/31/22 = 13.8 lbs(48,300cal)/30d = - 1,610 khai/d deficit   01/31/22 - 03/15/22 = - 14.6 lbs (51,100cal)/43d =  1,188 khai/d defict  Total cumulative energy variance = 99,400 khai/73d = -  1,361 khai/d deficit  Wt effect of HR foods = Restaurants 375 khai/wk + Sweets 420 + SSB 3,640 = 4,435 khai/wk = 633 khai/d = 32% DEN = 63 lbs/yr  Opted for a VLCD for its simplicity  Venlafaxine started as an appetite suppressant with the hope it will also treat her anxiety  Update:   VLCD - 7d/wk  Protein - Premier (only one) (+ chicken breast with dinner)  Fiber -    Fiber One cereal, 1/2 cup, with strawberry oatmeal  Fat - 1 snack container of PB (250 khai, 21 g fat)  Meal -     LC, 4 oz chicken with a cup of vegetables  Appetite suppressant -  venlafaxine XR 75 mg, one in am and one at noon, appetite suppression 8/10 before afternoon, 5/10 afternoon to bed  Contraception - +Hysterectomy  Assessment  - Improved; cont same plan; moving both of the venlafaxine tablets to the afternoon in hopes of improving late afternoon and evening appetite suppression; reduce snacks  Plan   -   Patient Instructions     Breakfast -     one high protein shake                            + 20 grams of fiber. Do this by either eating 12 tablespoons of the original, plain Fiber One cereal every day or 4 tablespoons of wheat dextrin powder (Benefiber or a generic brand) every day. Work up to this amount slowly by starting with only one-eighth to one-fourth of the target amount and then adding another one-eighth to one-fourth every one or two weeks until reaching the target.      Lunch -           one high protein shake                           + one a fat snack item     Dinner -          one frozen meal                           + one snack item     Shake options (<200 khai, >25 grams/protein) :  Nectar, Pure Protein, Premier, Boost Max, Ensure Max, BeneProtein and [employer] (which is lactose-free) are milk-based options; Nectar, Premier Protein Clear, IsoPure Protein Drink, and Protein 2 O are water-based options; (Premier Protein Clear, the water-based option, comes in a 20 oz bottle with 20 grams of protein and 90 calories. So you have to drink three each day which increases the cost.)  (Disclaimer: Dietary supplements rarely have their listed ingredients and the amount of each verified by a third party other. Sometimes they give verification for their claims to be GMO and gluten free and to be organic. However, even such verifications as these may still be untrustworthy.)     Fat snack options (<150 khai, >11 grams of fat): 22 almonds, 1 1/2 tablespoon of a oil-based dressing or 4 tablespoons of [place] dressing on a bed of salad greens, 1 1/2 tablespoons of peanut butter     Snack options (<100 kahi, no sweets): fruit, low fat/high protein [place] yogurt, mozzarella cheese stick, nuts, salad with dressing, peanut butter, chips/crackers/pretzels     Frozen meal options (<350 khai):  Weight Watchers Smart Ones, [employer] Cuisine, Healthy Choice, Anai's, Viridiana's     Food substitutes for the shakes:  4 oz of baked, grilled or broiled chicken, turkey or fish, 10 egg whites     Take a multivitamin daily     Walk 30 min every day    Trial taking Effexor XR 75 mg, both tablets at noon    3.  Other - New onset severe htn; labs ordered to r/o PHA; all were nml    Medication management : venlafaxine  Time spent on the encounter: 40 min    Ruddy Watson MD  Endocrinology/Obesity  3/15/22

## 2022-03-20 ENCOUNTER — OFFICE VISIT (OUTPATIENT)
Dept: FAMILY MEDICINE CLINIC | Age: 42
End: 2022-03-20
Payer: MEDICAID

## 2022-03-20 VITALS
TEMPERATURE: 97.8 F | BODY MASS INDEX: 35.08 KG/M2 | SYSTOLIC BLOOD PRESSURE: 130 MMHG | HEART RATE: 89 BPM | RESPIRATION RATE: 18 BRPM | WEIGHT: 198 LBS | HEIGHT: 63 IN | DIASTOLIC BLOOD PRESSURE: 80 MMHG | OXYGEN SATURATION: 100 %

## 2022-03-20 DIAGNOSIS — M54.9 MID BACK PAIN ON LEFT SIDE: Primary | ICD-10-CM

## 2022-03-20 DIAGNOSIS — M79.18 MUSCULOSKELETAL PAIN: ICD-10-CM

## 2022-03-20 PROCEDURE — 99213 OFFICE O/P EST LOW 20 MIN: CPT | Performed by: NURSE PRACTITIONER

## 2022-03-20 PROCEDURE — G8417 CALC BMI ABV UP PARAM F/U: HCPCS | Performed by: NURSE PRACTITIONER

## 2022-03-20 PROCEDURE — G8427 DOCREV CUR MEDS BY ELIG CLIN: HCPCS | Performed by: NURSE PRACTITIONER

## 2022-03-20 PROCEDURE — 4004F PT TOBACCO SCREEN RCVD TLK: CPT | Performed by: NURSE PRACTITIONER

## 2022-03-20 PROCEDURE — 96372 THER/PROPH/DIAG INJ SC/IM: CPT | Performed by: NURSE PRACTITIONER

## 2022-03-20 PROCEDURE — G8484 FLU IMMUNIZE NO ADMIN: HCPCS | Performed by: NURSE PRACTITIONER

## 2022-03-20 RX ORDER — KETOROLAC TROMETHAMINE 30 MG/ML
30 INJECTION, SOLUTION INTRAMUSCULAR; INTRAVENOUS ONCE
Status: COMPLETED | OUTPATIENT
Start: 2022-03-20 | End: 2022-03-20

## 2022-03-20 RX ORDER — METHYLPREDNISOLONE 4 MG/1
TABLET ORAL
Qty: 1 KIT | Refills: 0 | Status: SHIPPED
Start: 2022-03-20 | End: 2022-05-08 | Stop reason: ALTCHOICE

## 2022-03-20 RX ORDER — CYCLOBENZAPRINE HCL 10 MG
10 TABLET ORAL 3 TIMES DAILY PRN
Qty: 15 TABLET | Refills: 0 | Status: SHIPPED | OUTPATIENT
Start: 2022-03-20

## 2022-03-20 RX ORDER — TRIAMCINOLONE ACETONIDE 40 MG/ML
40 INJECTION, SUSPENSION INTRA-ARTICULAR; INTRAMUSCULAR ONCE
Status: COMPLETED | OUTPATIENT
Start: 2022-03-20 | End: 2022-03-20

## 2022-03-20 RX ADMIN — KETOROLAC TROMETHAMINE 30 MG: 30 INJECTION, SOLUTION INTRAMUSCULAR; INTRAVENOUS at 10:12

## 2022-03-20 RX ADMIN — TRIAMCINOLONE ACETONIDE 40 MG: 40 INJECTION, SUSPENSION INTRA-ARTICULAR; INTRAMUSCULAR at 10:13

## 2022-03-20 NOTE — PATIENT INSTRUCTIONS

## 2022-03-20 NOTE — PROGRESS NOTES
3/20/22  Buddy Hansen : 1980 Sex: female  Age 46 y.o. Subjective:  Chief Complaint   Patient presents with    Back Pain     x 3 weeks, gradually getting worse       HPI:   Buddy Hansen , 46 y.o. female presents to the clinic for evaluation of left mid back pain x 3 weeks. There patient also reports associated muscle spasms radiating around left rib area. The patient states she has been referred to chiropractic 5 days ago by Dr. Machelle Spain for same symptoms. The patient denies injury and denies history of back problems. Pt states the pain is worse with movement and improves with rest. The patient has taken ibuprofen for symptoms. The patient reports unchanged symptoms over time. Pt denies any radiating neck / arm / shoulder pain, bowel/bladder incontinence, loss of sensation or strength of extremities. The patient also denies headache, fever, chest pain, abdominal pain, shortness of breath, and nausea / vomiting / diarrhea. ROS:   Unless otherwise stated in this report the patient's positive and negative responses for review of systems for constitutional, eyes, ENT, cardiovascular, respiratory, gastrointestinal, neurological, , musculoskeletal, and integument systems and related systems to the presenting problem are either stated in the history of present illness or were not pertinent or were negative for the symptoms and/or complaints related to the presenting medical problem. Positives and pertinent negatives as per HPI. All others reviewed and are negative.       PMH:     Past Medical History:   Diagnosis Date    Abnormal Pap smear     Abscess     on legs in past, last on 10/1/16 no current abscess, no drainage    Adrenal nodule ([address]     Anesthesia     pt wakes up \" freaking out \" per pt    Cervical cancer ([address]     Chronic left-sided thoracic back pain     Class 2 obesity due to excess calories without serious comorbidity with body mass index (BMI) of 39.0 to 39.9 in adult     Gestational diabetes mellitus, antepartum 2012    no current issues, diet controlled    Primary hypertension     Stress incontinence        Past Surgical History:   Procedure Laterality Date     SECTION  2010    DILATION AND CURETTAGE      suction D&C    DILATION AND CURETTAGE OF UTERUS  10/2/2009    DILATION AND CURETTAGE OF UTERUS  2016    andrew novosure ablation    HYSTERECTOMY, TOTAL ABDOMINAL      larh    LEEP      OTHER SURGICAL HISTORY  2012    excision lower sinus tract abdomen    OTHER SURGICAL HISTORY  2012    [facility]    OTHER SURGICAL HISTORY  2001    SICK 14 D. HOSPITALIZED AFTER 1ST BABY    OTHER SURGICAL HISTORY  10/18/2017    [facility]       Family History   Problem Relation Age of Onset    Arthritis Mother     High Blood Pressure Mother     Asthma Mother     Heart Disease Maternal Grandmother     Diabetes Paternal Grandmother     Heart Failure Paternal Grandmother        Medications:     Current Outpatient Medications:     venlafaxine (EFFEXOR XR) 75 MG extended release capsule, Take one capsule twice daily, Disp: 60 capsule, Rfl: 2    metoprolol succinate (TOPROL XL) 50 MG extended release tablet, TAKE 1 TABLET BY MOUTH EVERY EVENING (Patient not taking: Reported on 3/20/2022), Disp: 30 tablet, Rfl: 3    losartan (COZAAR) 50 MG tablet, Take 0.5 tablets by mouth nightly (Patient not taking: Reported on 3/20/2022), Disp: 60 tablet, Rfl: 1    oxybutynin (DITROPAN-XL) 10 MG extended release tablet, Take 10 mg by mouth daily (Patient not taking: Reported on 3/20/2022), Disp: , Rfl:     Allergies:      Allergies   Allergen Reactions    Pcn [Penicillins] Shortness Of Breath and Swelling     Yeast infection      Cephalexin      Yeast infection       Social History:     Social History     Tobacco Use    Smoking status: Current Every Day Smoker     Packs/day: 1.00     Years: 15.00     Pack years: 15.00     Types: Cigarettes    Smokeless tobacco: Never Used   Vaping Use    Vaping Use: Never used   Substance Use Topics    Alcohol use: Yes     Alcohol/week: 0.0 standard drinks     Comment: rare    Drug use: No       Patient lives at home. Physical Exam:     Vitals:    03/20/22 0950   BP: 130/80   Pulse: 89   Resp: 18   Temp: 97.8 °F (36.6 °C)   TempSrc: Temporal   SpO2: 100%   Weight: 198 lb (89.8 kg)   Height: 5' 2.5\" (1.588 m)       Physical Exam (PE)   Constitutional: Alert, development consistent with age. HENT:      Head: Normocephalic. Right Ear: External ear normal.      Left Ear: External ear normal.      Nose: Normal.      Mouth/Throat:     Mouth: Mucous membranes are moist.      Pharynx: Oropharynx is clear. Eyes: Pupils: Pupils are equal, round, and reactive to light. Neck: Normal ROM. Supple. Cardiovascular: Heart RRR without pathologic murmurs or gallops. Pulmonary: Respiratory effort normal.  Normal breath sounds. Abdomen: Soft, nontender, normal bowel sounds. Back: Tenderness: TTP over left lower scapula area, around left rib with no appreciable midline tenderness. Swelling: No edema. Skin: No bruising, redness, abrasions, or rashes. Distal Function:              Motor deficit: Strength 5/5 in UE / LE's bilaterally. Sensory deficit: Distal sensation intact. Pulse deficit: Distal pulses 2+ and bounding. Gait:  No antalgia noted. Skin:  Normal turgor. Warm, dry, without visible rash. Neurological:  Alert and oriented. Motor functions intact. Psychiatric: Mood and Affect: Mood normal. Behavior: Behavior normal.    Testing:   (All laboratory and radiology results have been personally reviewed by myself)  Labs:  No results found for this visit on 03/20/22. Imaging: All Radiology results interpreted by Radiologist unless otherwise noted.   No orders to display       Assessment / Plan:   The patient's vitals, allergies, medications, and past medical history have been reviewed. There are no diagnoses linked to this encounter.    - Disposition: Home    - Educational material printed for patient's review and were included in patient instructions. After Visit Summary was given to patient at the end of visit. - Patient declined x-ray today. - Discussed symptomatic treatments including Tylenol prn pain, rest, ice, and/or moist heat for additional relief. Patient is directed to follow-up with PCP in 1 week if symptoms persist. Discussed red flag symptoms with the patient today. Patient is directed to go to the ED immediately with any worsening symptoms or red flag symptoms including: fever, severe or worsening back pain, paresthesias, weakness, or GI/ incontinence. SIGNATURE: ULISES Haynes-CNP    *NOTE: This report was transcribed using voice recognition software. Every effort was made to ensure accuracy; however, inadvertent computerized transcription errors may be present.

## 2022-03-21 ENCOUNTER — HOSPITAL ENCOUNTER (OUTPATIENT)
Dept: CT IMAGING | Age: 42
Discharge: HOME | End: 2022-03-23
Payer: MEDICAID

## 2022-03-21 ENCOUNTER — HOSPITAL ENCOUNTER (OUTPATIENT)
Age: 42
Discharge: HOME | End: 2022-03-21
Payer: MEDICAID

## 2022-03-21 DIAGNOSIS — R10.33 PERIUMBILICAL ABDOMINAL PAIN: ICD-10-CM

## 2022-03-21 DIAGNOSIS — R19.05 PERIUMBILICAL MASS: ICD-10-CM

## 2022-03-21 LAB
ANION GAP SERPL CALCULATED.3IONS-SCNC: 8 MMOL/L (ref 7–16)
BUN BLDV-MCNC: 10 MG/DL (ref 6–20)
CALCIUM SERPL-MCNC: 9.3 MG/DL (ref 8.6–10.2)
CHLORIDE BLD-SCNC: 103 MMOL/L (ref 98–107)
CO2: 24 MMOL/L (ref 22–29)
CREAT SERPL-MCNC: 0.6 MG/DL (ref 0.5–1)
GFR AFRICAN AMERICAN: >60
GFR NON-AFRICAN AMERICAN: >60 ML/MIN/1.73
GLUCOSE BLD-MCNC: 114 MG/DL (ref 74–99)
POTASSIUM SERPL-SCNC: 4.5 MMOL/L (ref 3.5–5)
SODIUM BLD-SCNC: 135 MMOL/L (ref 132–146)

## 2022-03-21 PROCEDURE — 2580000003 HC RX 258: Performed by: RADIOLOGY

## 2022-03-21 PROCEDURE — 74177 CT ABD & PELVIS W/CONTRAST: CPT

## 2022-03-21 PROCEDURE — 80048 BASIC METABOLIC PNL TOTAL CA: CPT

## 2022-03-21 PROCEDURE — 36415 COLL VENOUS BLD VENIPUNCTURE: CPT

## 2022-03-21 PROCEDURE — 6360000004 HC RX CONTRAST MEDICATION: Performed by: RADIOLOGY

## 2022-03-21 RX ORDER — SODIUM CHLORIDE 0.9 % (FLUSH) 0.9 %
10 SYRINGE (ML) INJECTION PRN
Status: COMPLETED | OUTPATIENT
Start: 2022-03-21 | End: 2022-03-21

## 2022-03-21 RX ADMIN — IOPAMIDOL 75 ML: 755 INJECTION, SOLUTION INTRAVENOUS at 11:35

## 2022-03-21 RX ADMIN — IOHEXOL 50 ML: 240 INJECTION, SOLUTION INTRATHECAL; INTRAVASCULAR; INTRAVENOUS; ORAL at 10:20

## 2022-03-21 RX ADMIN — SODIUM CHLORIDE, PRESERVATIVE FREE 10 ML: 5 INJECTION INTRAVENOUS at 11:33

## 2022-03-22 ENCOUNTER — OFFICE VISIT (OUTPATIENT)
Dept: CHIROPRACTIC MEDICINE | Age: 42
End: 2022-03-22
Payer: MEDICAID

## 2022-03-22 VITALS
WEIGHT: 198 LBS | SYSTOLIC BLOOD PRESSURE: 125 MMHG | DIASTOLIC BLOOD PRESSURE: 87 MMHG | TEMPERATURE: 97.8 F | HEIGHT: 64 IN | BODY MASS INDEX: 33.8 KG/M2 | OXYGEN SATURATION: 98 % | HEART RATE: 92 BPM

## 2022-03-22 DIAGNOSIS — M54.6 ACUTE LEFT-SIDED THORACIC BACK PAIN: ICD-10-CM

## 2022-03-22 DIAGNOSIS — M99.02 SEGMENTAL AND SOMATIC DYSFUNCTION OF THORACIC REGION: Primary | ICD-10-CM

## 2022-03-22 PROCEDURE — 98940 CHIROPRACT MANJ 1-2 REGIONS: CPT | Performed by: CHIROPRACTOR

## 2022-03-22 PROCEDURE — 99213 OFFICE O/P EST LOW 20 MIN: CPT | Performed by: CHIROPRACTOR

## 2022-03-22 NOTE — PROGRESS NOTES
Patient is here for back pain. Patient states she has lost 30lbs in the last 2 months. Patient states pain for 3 weeks. Patient states she lost weight by eating healthy and walking.  Carmen Mojica MD  Electronically signed by Edison Almanza LPN on 7/78/1637 at 1:99 AM

## 2022-03-22 NOTE — PROGRESS NOTES
3/22/22  Violette Alvarado : 1980 Sex: female  Age: 46 y.o. Chief Complaint   Patient presents with    Back Pain       HPI:   Back Pain  Patient presents for evaluation of left sided thoracic pain. I last saw Violette Alvarado <3 years ago. The current symptoms have been present for 3 weeks and include hot poker type pain, wraps to left rib cage. Had some anterior bruising in costosternal region per her reporting. Initial inciting event: none. Symptoms are worse - no temporal factor. Aggravating factors identifiable by the patient are laying in bed, sleeping, rolling over, twisting. . Alleviating factors identifiable by the patient are flexeril, prednisone taper. Treatments initiated by the patient: went through PCP and [facility] care, had abdominal CT. .         Current Outpatient Medications:     cyclobenzaprine (FLEXERIL) 10 MG tablet, Take 1 tablet by mouth 3 times daily as needed for Muscle spasms, Disp: 15 tablet, Rfl: 0    methylPREDNISolone (MEDROL DOSEPACK) 4 MG tablet, Take by mouth., Disp: 1 kit, Rfl: 0    metoprolol succinate (TOPROL XL) 50 MG extended release tablet, TAKE 1 TABLET BY MOUTH EVERY EVENING, Disp: 30 tablet, Rfl: 3    venlafaxine (EFFEXOR XR) 75 MG extended release capsule, Take one capsule twice daily, Disp: 60 capsule, Rfl: 2    losartan (COZAAR) 50 MG tablet, Take 0.5 tablets by mouth nightly, Disp: 60 tablet, Rfl: 1    oxybutynin (DITROPAN-XL) 10 MG extended release tablet, Take 10 mg by mouth daily , Disp: , Rfl:     Exam:   Vitals:    22 0936   BP: 125/87   Pulse: 92   Temp: 97.8 °F (36.6 °C)   SpO2: 98%       Appearance: alert, well appearing, and in no distress, oriented to person, place, and time and overweight. Ambulates without difficulty. No antalgia or list.      Mild limitation of active range of motion in the lumbar spine throughout. In the thoracic spine, left rotation is well-preserved and pain-free.   Right rotation is moderately restricted with endrange pain identified in the left thoracic region. Reflexes are +2/5 and symmetrical at the Patella and Achilles. Manual muscle testing reveals: 5/5 strength to the LE indicator muscles. Sensation to light touch is WNL to the distal lower extremity dermatomes. Posterior tibial pulses 2/4 B/L. Plantar response reveals down-going toes b/l. Seated SLRs are negative bilaterally. Kemps testing negative bilaterally. Resisted chest expansion and excursion are unremarkable. She does have tenderness of the costotransverse joints on the left at T7-9. No true midline pain. Taut and Tender fibers thoracic paraspinals B/L  Joint fixation at: T7-9      Ridge Vazquez was seen today for back pain. Diagnoses and all orders for this visit:    Segmental and somatic dysfunction of thoracic region        Treatment Plan: Reexamination performed today due to new issues. She is already been started on medications. She has had a CT scan performed. I will start her in some conservative care today. We will see how she responds, consider some physical therapy if no better. I will plan on seeing her 2 times per week for 4 visits total, then reevaluate. Today, with her consent I started mechanically assisted manipulation to the affected thoracic segments which she tolerated well. Then introduced some doorway stretches for the latissimus, cat-camel stretches emphasizing thoracic flexion and extension and finally left thoracic rotations x10, 2-3 times per day. All tolerated well I will see her back on Thursday for care.       Seen By:  Nilda Lentz DC

## 2022-04-19 ENCOUNTER — OFFICE VISIT (OUTPATIENT)
Dept: BARIATRICS/WEIGHT MGMT | Age: 42
End: 2022-04-19
Payer: MEDICAID

## 2022-04-19 VITALS
WEIGHT: 192.4 LBS | TEMPERATURE: 97.2 F | BODY MASS INDEX: 34.09 KG/M2 | HEART RATE: 99 BPM | HEIGHT: 63 IN | DIASTOLIC BLOOD PRESSURE: 92 MMHG | SYSTOLIC BLOOD PRESSURE: 146 MMHG

## 2022-04-19 DIAGNOSIS — F41.9 ANXIETY: ICD-10-CM

## 2022-04-19 DIAGNOSIS — E66.9 OBESITY (BMI 30-39.9): ICD-10-CM

## 2022-04-19 DIAGNOSIS — I10 PRIMARY HYPERTENSION: Primary | ICD-10-CM

## 2022-04-19 PROCEDURE — 99214 OFFICE O/P EST MOD 30 MIN: CPT | Performed by: INTERNAL MEDICINE

## 2022-04-19 PROCEDURE — G8417 CALC BMI ABV UP PARAM F/U: HCPCS | Performed by: INTERNAL MEDICINE

## 2022-04-19 PROCEDURE — 4004F PT TOBACCO SCREEN RCVD TLK: CPT | Performed by: INTERNAL MEDICINE

## 2022-04-19 PROCEDURE — 99211 OFF/OP EST MAY X REQ PHY/QHP: CPT

## 2022-04-19 PROCEDURE — G8428 CUR MEDS NOT DOCUMENT: HCPCS | Performed by: INTERNAL MEDICINE

## 2022-04-19 RX ORDER — VENLAFAXINE HYDROCHLORIDE 75 MG/1
CAPSULE, EXTENDED RELEASE ORAL
Qty: 180 CAPSULE | Refills: 1 | Status: SHIPPED | OUTPATIENT
Start: 2022-04-19

## 2022-04-19 NOTE — PATIENT INSTRUCTIONS
Breakfast -     one high protein shake                            + 20 grams of fiber. Do this by either eating 12 tablespoons of the original, plain Fiber One cereal every day or 4 tablespoons of wheat dextrin powder (Benefiber or a generic brand) every day. Work up to this amount slowly by starting with only one-eighth to one-fourth of the target amount and then adding another one-eighth to one-fourth every one or two weeks until reaching the target. Lunch -           one high protein shake                           + one a fat snack item     Dinner -          one frozen meal                           + one snack item     Shake options (<200 khai, >25 grams/protein) :  Nectar, Pure Protein, Premier, Boost Max, Ensure Max, BeneProtein and [employer] (which is lactose-free) are milk-based options; Nectar, Premier Protein Clear, IsoPure Protein Drink, and Protein 2 O are water-based options; (Premier Protein Clear, the water-based option, comes in a 20 oz bottle with 20 grams of protein and 90 calories. So you have to drink three each day which increases the cost.)  (Disclaimer: Dietary supplements rarely have their listed ingredients and the amount of each verified by a third party other. Sometimes they give verification for their claims to be GMO and gluten free and to be organic.  However, even such verifications as these may still be untrustworthy.)     Fat snack options (<150 khai, >11 grams of fat): 22 almonds, 1 1/2 tablespoon of a oil-based dressing or 4 tablespoons of [place] dressing on a bed of salad greens, 1 1/2 tablespoons of peanut butter     Snack options (<100 khai, no sweets): fruit, low fat/high protein [place] yogurt, mozzarella cheese stick, nuts, salad with dressing, peanut butter, chips/crackers/pretzels     Frozen meal options (<350 khai):  Weight Watchers Smart Ones, Lean Cuisine, Healthy Choice, Anai's, Viridiana's     Food substitutes for the shakes:  4 oz of baked, grilled or broiled chicken, turkey or fish, 10 egg whites     Take a multivitamin daily     Walk 30 min every day    Cont to take venlafaxine ER 75mg, one tablet twice daily

## 2022-04-19 NOTE — PROGRESS NOTES
CC -   HTN, Obesity    BACKGROUND -   Last visit: 03/15/22  First visit: 12/27/21     Obesity   Began in childhood  Initial BMI 40.0, Wt 225.6 lbs, Ht 5' 3.5\"  HS Grad wt 175 lbs   Lowest   wt 142 lbs (age 24; due to a very stressful pregnancy and very hectic post-partum lifestyle)  Highest  wt 248 lbs  Pattern of wt gain: grad with a rapid gain after quitting smoking  Wt change past yr: +10 lbs  Most wt lost: 30 lbs (liquid MR + eating less/right)  Other diets attempted: Keto, Large quantity of water, Calorie counting    Desire to lose weight: 10/10  Prob posed by appetite: 7/10    Initial Diet:    Number of meals per day - 1    Number of snacks per day - 4    Meal volume - 12\" plate, sometimes seconds    Fast food/convenience store - 1-2x/week    Restaurants (not fast food) - 1x/week   Sweets - 2d/week (1 reg sized [employer])   Chips - 1d/week   Crackers/pretzels - 0d/week   Nuts - 0d/week   Peanut Butter - 0d/week   Popcorn - 0d/week   Dried fruit - 0d/week   Whole fruit - 3-5d/week (1 serving)   Breakfast cereal - 1-2d/week (Honey Bunches of Oats, Strawberry Oatmeal)   Granola/Protein/Energy bar - 0d/week   Sugar sweetened beverages - 12 oz reg soda/day, 2 cups coffee/day each with 4 Tbsps flavored creamer + 1 tablespoon sugar, 4 oz of fruit juice/wk   Protein - No supplements   Fiber - No supplements     Exercise:    Gym membership - [employer]    Walking - none    Running - none    Resistance - none    Aerobic class - none    ______________________    [facility] -  Past Medical History:   Diagnosis Date    Abnormal Pap smear     Abscess     on legs in past, last on 10/1/16 no current abscess, no drainage    Adrenal nodule (HCC)     Anesthesia     pt wakes up \" freaking out \" per pt    Cervical cancer ([address]     Chronic left-sided thoracic back pain     Class 2 obesity due to excess calories without serious comorbidity with body mass index (BMI) of 39.0 to 39.9 in adult     Gestational diabetes mellitus, antepartum 12/26/2012    no current issues, diet controlled    Primary hypertension     Stress incontinence      Current Outpatient Medications   Medication Sig Dispense Refill    cyclobenzaprine (FLEXERIL) 10 MG tablet Take 1 tablet by mouth 3 times daily as needed for Muscle spasms 15 tablet 0    methylPREDNISolone (MEDROL DOSEPACK) 4 MG tablet Take by mouth. 1 kit 0    metoprolol succinate (TOPROL XL) 50 MG extended release tablet TAKE 1 TABLET BY MOUTH EVERY EVENING 30 tablet 3    venlafaxine (EFFEXOR XR) 75 MG extended release capsule Take one capsule twice daily 60 capsule 2    losartan (COZAAR) 50 MG tablet Take 0.5 tablets by mouth nightly 60 tablet 1    oxybutynin (DITROPAN-XL) 10 MG extended release tablet Take 10 mg by mouth daily        No current facility-administered medications for this visit. PE -  Gen : BP (!) 151/99 (Site: Right Upper Arm, Position: Sitting, Cuff Size: Large Adult)   Pulse 99   Temp 97.2 °F (36.2 °C) (Temporal)   Ht 5' 3.25\" (1.607 m)   Wt 192 lb 6.4 oz (87.3 kg)   LMP 08/31/2017 (Exact Date)   BMI 33.81 kg/m²     Repeat /92   WN, WD, NAD  Heart:  RRR w/o MGR, no carotid bruits, + LE edema b/l  Lung: Nml resp effort, CTA b/l  Psych: Normal mood   Full affect  Neuro: Moves all ext well  ______________________    HISTORY & ASSESSMENT/PLAN -     Problem 1  - HTN   HPI   - Diagnosed 10/2021       Home BP readings: upper 110's/80's      Regimen: Off medications      First noted to have elevated BP 10/11/21. This occurred after having a 5d course of prednisone 9/16 - 9/21 and a few days before beginning oxybutynin 10/18/21      Both of these drugs can cause HTN. I therefore stopped the oxybutynin; (it was not helping her stress incontinence; once stopping it, it did not worsen)      The improvement in her BP, however, may be from her dietary changes instead of stopping oxybutynin       Today, her BP is high.  However, her home readings have been normal. There is therefore no need to start an anti-hypertensive agent  Assessment  - Controlled per home readings  Plan   -   Cont with wt reduction per the plan below    Problem 2  - Obesity   HPI   - See above Background for description    Weight  Date    225.6 lbs 12/27/21 (started her diet 1/1/22 home 227.2 lbs)    211.8 lbs 01/31/22 home 211.8 lbs    198.2 lbs 03/15/22 home 197.2 lbs    192.4 lbs 04/19/22 home 190.8 lbs  Total wt loss to date: 33.2 lbs  DEN = 1985 khai/d = 13,895 khai/wk  Avg daily energy variance:   12/27/21 - 01/31/22 = 13.8 lbs(48,300cal)/30d = - 1,610 khai/d deficit   01/31/22 - 03/15/22 = - 14.6 lbs (51,100cal)/43d = - 1,188 khai/d defict   03/15/22 - 04/19/22 = -  6.4 lbs (22,400 khai)/35d =  -     64 khai/d deficit  Total cumulative energy variance = 99,400 khai/73d = -  1,361 khai/d deficit  Wt effect of HR foods = Restaurants 375 khai/wk + Sweets 420 + SSB 3,640 = 4,435 khai/wk = 633 khai/d = 32% DEN = 63 lbs/yr  Opted for a VLCD for its simplicity  Venlafaxine started as an appetite suppressant with the hope it will also treat her anxiety  Update:   VLCD - 7d/wk except for adding a 100-150 khai snack for breakfast  Protein - Premier (only one) (+ chicken breast with dinner)  Fiber -    none  Fat - 1/2 snack container of PB (125 khai, 21 g fat)  Meal -     LC, 4 oz chicken with a cup of vegetables  Appetite suppressant -  venlafaxine XR, one 37.5 mg tab in am and 75 mg tab at noon, appetite suppression 7/10 before afternoon, 7-8/10 afternoon to bed; side effects - none (not helping with her anxiety)  Did not experiment with moving the timing of venlafaxine  Exercise - swims with her children 1x/wk, not yet walking  Contraception - +Hysterectomy  Assessment  - Improved; cont same plan; increase venlafaxine XR to 75 mg twice daily  Plan   -   Patient Instructions     Breakfast -     one high protein shake                            + 20 grams of fiber.  Do this by either eating 12 tablespoons of the original, plain Fiber One cereal every day or 4 tablespoons of wheat dextrin powder (Benefiber or a generic brand) every day. Work up to this amount slowly by starting with only one-eighth to one-fourth of the target amount and then adding another one-eighth to one-fourth every one or two weeks until reaching the target. Lunch -           one high protein shake                           + one a fat snack item     Dinner -          one frozen meal                           + one snack item     Shake options (<200 khai, >25 grams/protein) :  Nectar, Pure Protein, Premier, Boost Max, Ensure Max, BeneProtein and [place] [employer] (which is lactose-free) are milk-based options; Nectar, Premier Protein Clear, IsoPure Protein Drink, and Protein 2 O are water-based options; (Premier Protein Clear, the water-based option, comes in a 20 oz bottle with 20 grams of protein and 90 calories. So you have to drink three each day which increases the cost.)  (Disclaimer: Dietary supplements rarely have their listed ingredients and the amount of each verified by a third party other. Sometimes they give verification for their claims to be GMO and gluten free and to be organic. However, even such verifications as these may still be untrustworthy.)     Fat snack options (<150 khai, >11 grams of fat): 22 almonds, 1 1/2 tablespoon of a oil-based dressing or 4 tablespoons of [place] dressing on a bed of salad greens, 1 1/2 tablespoons of peanut butter     Snack options (<100 khai, no sweets): fruit, low fat/high protein [place] yogurt, mozzarella cheese stick, nuts, salad with dressing, peanut butter, chips/crackers/pretzels     Frozen meal options (<350 khai):  Weight Watchers Smart Ones, [employer] Cuisine, Healthy Choice, Anai's, Viridiana's     Food substitutes for the shakes:  4 oz of baked, grilled or broiled chicken, turkey or fish, 10 egg whites     Take a multivitamin daily     Walk 30 min every day    Cont to take venlafaxine ER 75mg, one tablet twice daily    3. Other - New onset severe htn; labs ordered to r/o PHA; all were nml    Follow up:  See me back in 4-6 weeks    Medication management : venlafaxine      Sveta De La Rosa MD  Endocrinology/Obesity  4/19/22

## 2022-05-02 ENCOUNTER — TELEPHONE (OUTPATIENT)
Dept: PRIMARY CARE CLINIC | Age: 42
End: 2022-05-02

## 2022-05-02 NOTE — TELEPHONE ENCOUNTER
Patient having symptoms of UTI. Slight burning sensations and lower abdominal cramps- had hysterectomy. Asking if you could be willing to call in a script for her. States that she never gets these and knows exactly what it is. Really hard for patient to come in due to her work schedule.  Doesn't get out of work today until 6pm.

## 2022-05-03 NOTE — TELEPHONE ENCOUNTER
Patient advised, she will do evist since she is unable to do express due to schedule.  She has been taking cranberry and AZO pills

## 2022-05-08 ENCOUNTER — OFFICE VISIT (OUTPATIENT)
Dept: FAMILY MEDICINE CLINIC | Age: 42
End: 2022-05-08
Payer: MEDICAID

## 2022-05-08 VITALS
TEMPERATURE: 97.9 F | RESPIRATION RATE: 18 BRPM | BODY MASS INDEX: 34.78 KG/M2 | SYSTOLIC BLOOD PRESSURE: 114 MMHG | OXYGEN SATURATION: 99 % | HEART RATE: 83 BPM | HEIGHT: 62 IN | DIASTOLIC BLOOD PRESSURE: 74 MMHG | WEIGHT: 189 LBS

## 2022-05-08 DIAGNOSIS — N30.01 ACUTE CYSTITIS WITH HEMATURIA: ICD-10-CM

## 2022-05-08 DIAGNOSIS — R30.0 DYSURIA: ICD-10-CM

## 2022-05-08 DIAGNOSIS — R30.0 DYSURIA: Primary | ICD-10-CM

## 2022-05-08 LAB
BILIRUBIN, POC: ABNORMAL
BLOOD URINE, POC: ABNORMAL
CLARITY, POC: ABNORMAL
COLOR, POC: ABNORMAL
GLUCOSE URINE, POC: ABNORMAL
KETONES, POC: NEGATIVE
LEUKOCYTE EST, POC: ABNORMAL
NITRITE, POC: POSITIVE
PH, POC: 5.5
PROTEIN, POC: ABNORMAL
SPECIFIC GRAVITY, POC: 1.03
UROBILINOGEN, POC: 1

## 2022-05-08 PROCEDURE — 99213 OFFICE O/P EST LOW 20 MIN: CPT | Performed by: STUDENT IN AN ORGANIZED HEALTH CARE EDUCATION/TRAINING PROGRAM

## 2022-05-08 PROCEDURE — 4004F PT TOBACCO SCREEN RCVD TLK: CPT | Performed by: STUDENT IN AN ORGANIZED HEALTH CARE EDUCATION/TRAINING PROGRAM

## 2022-05-08 PROCEDURE — 81002 URINALYSIS NONAUTO W/O SCOPE: CPT | Performed by: STUDENT IN AN ORGANIZED HEALTH CARE EDUCATION/TRAINING PROGRAM

## 2022-05-08 PROCEDURE — G8417 CALC BMI ABV UP PARAM F/U: HCPCS | Performed by: STUDENT IN AN ORGANIZED HEALTH CARE EDUCATION/TRAINING PROGRAM

## 2022-05-08 PROCEDURE — G8427 DOCREV CUR MEDS BY ELIG CLIN: HCPCS | Performed by: STUDENT IN AN ORGANIZED HEALTH CARE EDUCATION/TRAINING PROGRAM

## 2022-05-08 RX ORDER — NITROFURANTOIN 25; 75 MG/1; MG/1
100 CAPSULE ORAL 2 TIMES DAILY
Qty: 20 CAPSULE | Refills: 0 | Status: SHIPPED | OUTPATIENT
Start: 2022-05-08 | End: 2022-05-18

## 2022-05-08 NOTE — PROGRESS NOTES
Chief Complaint:   Dysuria      History of Present Illness   Source of history provided by:  patient. Misha Buckley is a 46 y.o. old female who has a past medical history of:   Past Medical History:   Diagnosis Date    Abnormal Pap smear     Abscess     on legs in past, last on 10/1/16 no current abscess, no drainage    [facility]     Anesthesia     pt wakes up \" freaking out \" per pt    Cervical cancer ([facility]     Chronic left-sided thoracic back pain     Class 2 obesity due to excess calories without serious comorbidity with body mass index (BMI) of 39.0 to 39.9 in adult     Gestational diabetes mellitus, antepartum 2012    no current issues, diet controlled    Primary hypertension     Stress incontinence     Presents to the walk in clinic with complaints of dysuria x 10 days. Reports associated frequency, urgency, and suprapubic pressure. Denies gross hematuria. There is associated flank pain. Denies any fever, chills, vaginal discharge, vaginal bleeding, possibility of pregnancy, vomiting, diarrhea, or lethargy. Patient's last menstrual period was 2017 (exact date). ROS    Unless otherwise stated in this report or unable to obtain because of the patient's clinical or mental status as evidenced by the medical record, this patients's positive and negative responses for Review of Systems, constitutional, psych, eyes, ENT, cardiovascular, respiratory, gastrointestinal, neurological, genitourinary, musculoskeletal, integument systems and systems related to the presenting problem are either stated in the preceding or were not pertinent or were negative for the symptoms and/or complaints related to the medical problem.     Past Surgical history:   Past Surgical History:   Procedure Laterality Date     SECTION  2010    DILATION AND CURETTAGE      suction D&C    DILATION AND CURETTAGE OF UTERUS  10/2/2009    DILATION AND CURETTAGE OF UTERUS  2016 andrew novosure ablation    HYSTERECTOMY, TOTAL ABDOMINAL      larh    LEEP      OTHER SURGICAL HISTORY  6/25/2012    excision lower sinus tract abdomen    OTHER SURGICAL HISTORY  06/25/2012    [facility]    OTHER SURGICAL HISTORY  2001    SICK 14 D. HOSPITALIZED AFTER 1ST BABY    OTHER SURGICAL HISTORY  10/18/2017    [facility]     Social History:  reports that she has been smoking cigarettes. She has a 15.00 pack-year smoking history. She has never used smokeless tobacco. She reports current alcohol use. She reports that she does not use drugs. Family History: family history includes Arthritis in her mother; Asthma in her mother; Diabetes in her paternal grandmother; Heart Disease in her maternal grandmother; Heart Failure in her paternal grandmother; High Blood Pressure in her mother. Allergies: Pcn [penicillins] and Cephalexin    Physical Exam         VS:  /74   Pulse 83   Temp 97.9 °F (36.6 °C) (Temporal)   Resp 18   Ht 5' 2\" (1.575 m)   Wt 189 lb (85.7 kg)   LMP 08/31/2017 (Exact Date)   SpO2 99%   BMI 34.57 kg/m²    Oxygen Saturation Interpretation: Normal.    Constitutional:  A&Ox3, development consistent with age, NAD. Lungs:  CTAB without wheezing, rales, or rhonchi. Heart:  RRR without pathologic murmurs, rubs, or gallops. Abdomen: Soft, nondistended, with mild suprapubic tenderness. No rebound, rigidity, or guarding. BS+ X4. No organomegaly. Back: no CVA tenderness. Skin:  Normal turgor. Warm, dry, without visible rash, unless noted elsewhere. Neurological:  Alert and oriented. Motor functions intact. Responds to verbal commands.     Lab / Imaging Results   (All laboratory and radiology results have been personally reviewed by myself)  Labs:  Results for orders placed or performed in visit on 05/08/22   POCT Urinalysis no Micro   Result Value Ref Range    Color, UA caryn     Clarity, UA cloudy     Glucose, UA  mg/dL     Bilirubin, UA negtive     Ketones, UA negative     Spec Grav, UA 1.030     Blood, UA POC moderate     pH, UA 5.5     Protein, UA POC >300 mg/dL     Urobilinogen, UA 1.0     Leukocytes, UA trace     Nitrite, UA positive        Imaging: All Radiology results interpreted by Radiologist unless otherwise noted. No orders to display     Assessment / Plan     Impression(s):  Ivana Becerra was seen today for dysuria. Diagnoses and all orders for this visit:    Dysuria  -     POCT Urinalysis no Micro  -     Culture, Urine; Future  -     nitrofurantoin, macrocrystal-monohydrate, (MACROBID) 100 MG capsule; Take 1 capsule by mouth 2 times daily for 10 days    Acute cystitis with hematuria  -     nitrofurantoin, macrocrystal-monohydrate, (MACROBID) 100 MG capsule; Take 1 capsule by mouth 2 times daily for 10 days    I did discuss with the patient to not wait so long when she has these symptoms and discussed things such as pyelonephritis with her     Disposition:  Disposition: Discharge to home. UA appears positive for a UTI. Urine C&S pending, will call with results once available. Script written for macrobid, side effects discussed. Increase fluids and rest. F/u PCP in 3-5 days if symptoms persist. ED sooner if symptoms worsen or change. ED immediately with the development of fever, shaking chills, body aches, flank pain, vomiting, CP, or SOB. Pt is in agreement with this care plan. All questions answered.      Evie Shepherd DO

## 2022-05-10 LAB
ORGANISM: ABNORMAL
URINE CULTURE, ROUTINE: ABNORMAL

## 2022-06-21 DIAGNOSIS — I10 PRIMARY HYPERTENSION: ICD-10-CM

## 2022-06-22 RX ORDER — METOPROLOL SUCCINATE 50 MG/1
50 TABLET, EXTENDED RELEASE ORAL EVERY EVENING
Qty: 30 TABLET | Refills: 1 | Status: SHIPPED | OUTPATIENT
Start: 2022-06-22

## 2022-11-15 ENCOUNTER — OFFICE VISIT (OUTPATIENT)
Dept: FAMILY MEDICINE CLINIC | Age: 42
End: 2022-11-15
Payer: MEDICAID

## 2022-11-15 VITALS
WEIGHT: 199.8 LBS | TEMPERATURE: 98.4 F | BODY MASS INDEX: 36.54 KG/M2 | DIASTOLIC BLOOD PRESSURE: 74 MMHG | HEART RATE: 76 BPM | OXYGEN SATURATION: 94 % | SYSTOLIC BLOOD PRESSURE: 122 MMHG

## 2022-11-15 DIAGNOSIS — J10.1 INFLUENZA A: Primary | ICD-10-CM

## 2022-11-15 DIAGNOSIS — R50.9 FEVER, UNSPECIFIED FEVER CAUSE: ICD-10-CM

## 2022-11-15 LAB
INFLUENZA A ANTIBODY: POSITIVE
INFLUENZA B ANTIBODY: ABNORMAL
Lab: NORMAL
PERFORMING INSTRUMENT: NORMAL
QC PASS/FAIL: NORMAL
SARS-COV-2, POC: NORMAL

## 2022-11-15 PROCEDURE — G8417 CALC BMI ABV UP PARAM F/U: HCPCS | Performed by: EMERGENCY MEDICINE

## 2022-11-15 PROCEDURE — 4004F PT TOBACCO SCREEN RCVD TLK: CPT | Performed by: EMERGENCY MEDICINE

## 2022-11-15 PROCEDURE — G8484 FLU IMMUNIZE NO ADMIN: HCPCS | Performed by: EMERGENCY MEDICINE

## 2022-11-15 PROCEDURE — G8427 DOCREV CUR MEDS BY ELIG CLIN: HCPCS | Performed by: EMERGENCY MEDICINE

## 2022-11-15 PROCEDURE — 3078F DIAST BP <80 MM HG: CPT | Performed by: EMERGENCY MEDICINE

## 2022-11-15 PROCEDURE — 99213 OFFICE O/P EST LOW 20 MIN: CPT | Performed by: EMERGENCY MEDICINE

## 2022-11-15 PROCEDURE — 87804 INFLUENZA ASSAY W/OPTIC: CPT | Performed by: EMERGENCY MEDICINE

## 2022-11-15 PROCEDURE — 87426 SARSCOV CORONAVIRUS AG IA: CPT | Performed by: EMERGENCY MEDICINE

## 2022-11-15 PROCEDURE — 3074F SYST BP LT 130 MM HG: CPT | Performed by: EMERGENCY MEDICINE

## 2022-11-15 RX ORDER — IBUPROFEN 600 MG/1
600 TABLET ORAL EVERY 8 HOURS PRN
Qty: 20 TABLET | Refills: 0 | Status: SHIPPED | OUTPATIENT
Start: 2022-11-15

## 2022-11-15 RX ORDER — BENZONATATE 200 MG/1
200 CAPSULE ORAL 3 TIMES DAILY PRN
Qty: 30 CAPSULE | Refills: 0 | Status: SHIPPED | OUTPATIENT
Start: 2022-11-15 | End: 2022-11-22

## 2022-11-15 RX ORDER — GUAIFENESIN 600 MG/1
600 TABLET, EXTENDED RELEASE ORAL 2 TIMES DAILY
Qty: 30 TABLET | Refills: 0 | Status: SHIPPED | OUTPATIENT
Start: 2022-11-15 | End: 2022-11-30

## 2022-11-15 ASSESSMENT — ENCOUNTER SYMPTOMS
ABDOMINAL DISTENTION: 0
COUGH: 1
EYE PAIN: 0
EYE DISCHARGE: 0
DIARRHEA: 0
VOMITING: 0
SHORTNESS OF BREATH: 0
EYE REDNESS: 0
WHEEZING: 0
SINUS PRESSURE: 1
NAUSEA: 0
SORE THROAT: 1
BACK PAIN: 0

## 2022-11-15 NOTE — PROGRESS NOTES
Chief Complaint:   Fever (Fever, body aches, chills X1 day)      History of Present Illness   HPI:  Milagros Mcneal is a 38 y.o. female who presents to [facility] today for 24 hours of flu symptoms. Prior to Visit Medications    Medication Sig Taking? Authorizing Provider   guaiFENesin (MUCINEX) 600 MG extended release tablet Take 1 tablet by mouth 2 times daily for 15 days Yes Philippe Rodriguez, DO   benzonatate (TESSALON) 200 MG capsule Take 1 capsule by mouth 3 times daily as needed for Cough Yes Thiago Romo, DO   ibuprofen (IBU) 600 MG tablet Take 1 tablet by mouth every 8 hours as needed for Pain Yes Thiago Romo, DO   metoprolol succinate (TOPROL XL) 50 MG extended release tablet TAKE 1 TABLET BY MOUTH EVERY EVENING  Rachana Padilla MD   venlafaxine (EFFEXOR XR) 75 MG extended release capsule Take one capsule twice daily  Maite Nolen MD   cyclobenzaprine (FLEXERIL) 10 MG tablet Take 1 tablet by mouth 3 times daily as needed for Muscle spasms  Patient not taking: Reported on 5/8/2022  Celi Davis, APRN - CNP   losartan (COZAAR) 50 MG tablet Take 0.5 tablets by mouth nightly  Rachana Padilla MD   oxybutynin (DITROPAN-XL) 10 MG extended release tablet Take 10 mg by mouth daily   Patient not taking: Reported on 5/8/2022  Historical Provider, MD       Review of Systems   Review of Systems   Constitutional:  Positive for activity change, appetite change, chills, fatigue and fever. HENT:  Positive for congestion, ear pain, sinus pressure and sore throat. Eyes:  Negative for pain, discharge and redness. Respiratory:  Positive for cough. Negative for shortness of breath and wheezing. Cardiovascular:  Negative for chest pain. Gastrointestinal:  Negative for abdominal distention, diarrhea, nausea and vomiting. Genitourinary:  Negative for dysuria and frequency. Musculoskeletal:  Positive for myalgias. Negative for arthralgias and back pain. Skin:  Negative for rash and wound. Neurological:  Negative for weakness and headaches. Hematological:  Negative for adenopathy. Psychiatric/Behavioral: Negative. All other systems reviewed and are negative. Patient's medical, social, and family history reviewed    Past Medical History:  has a past medical history of Abnormal Pap smear, Abscess, Adrenal nodule ([address] Anesthesia, Cervical cancer ([facility] [address] Chronic left-sided thoracic back pain, Class 2 obesity due to excess calories without serious comorbidity with body mass index (BMI) of 39.0 to 39.9 in adult, Gestational diabetes mellitus, antepartum, Primary hypertension, and Stress incontinence. Past Surgical History:  has a past surgical history that includes LEEP; Dilation and curettage of uterus (10/2/2009);  section (2010); other surgical history (2012); Dilation & curettage; other surgical history (2012); other surgical history (); Dilation and curettage of uterus (2016); other surgical history (10/18/2017); and Hysterectomy, total abdominal.  Social History:  reports that she has been smoking cigarettes. She has a 15.00 pack-year smoking history. She has never used smokeless tobacco. She reports current alcohol use. She reports that she does not use drugs. Family History: family history includes Arthritis in her mother; Asthma in her mother; Diabetes in her paternal grandmother; Heart Disease in her maternal grandmother; Heart Failure in her paternal grandmother; High Blood Pressure in her mother. Allergies: Pcn [penicillins] and Cephalexin    Physical Exam   Vital Signs:  /74 (Site: Right Upper Arm, Position: Sitting)   Pulse 76   Temp 98.4 °F (36.9 °C) (Temporal)   Wt 199 lb 12.8 oz (90.6 kg)   LMP 2017 (Exact Date)   SpO2 94%   BMI 36.54 kg/m²    Oxygen Saturation Interpretation: Normal.    Physical Exam  Vitals and nursing note reviewed. Constitutional:       Appearance: She is well-developed.    HENT:      Head: Normocephalic and atraumatic. Right Ear: Hearing and external ear normal. A middle ear effusion is present. Left Ear: Hearing and external ear normal. A middle ear effusion is present. Nose: Congestion and rhinorrhea present. Mouth/Throat:      Pharynx: Uvula midline. Posterior oropharyngeal erythema present. Eyes:      General: Lids are normal.      Conjunctiva/sclera: Conjunctivae normal.      Pupils: Pupils are equal, round, and reactive to light. Cardiovascular:      Rate and Rhythm: Normal rate and regular rhythm. Heart sounds: Normal heart sounds. No murmur heard. Pulmonary:      Effort: Pulmonary effort is normal.      Breath sounds: Normal breath sounds. Abdominal:      General: Bowel sounds are normal.      Palpations: Abdomen is soft. Abdomen is not rigid. Tenderness: There is no abdominal tenderness. There is no guarding or rebound. Musculoskeletal:      Cervical back: Normal range of motion and neck supple. Skin:     General: Skin is warm and dry. Findings: No abrasion or rash. Neurological:      Mental Status: She is alert and oriented to person, place, and time. GCS: GCS eye subscore is 4. GCS verbal subscore is 5. GCS motor subscore is 6. Cranial Nerves: No cranial nerve deficit. Sensory: No sensory deficit.       Coordination: Coordination normal.      Gait: Gait normal.   Recent Results (from the past 24 hour(s))   POCT Influenza A/B    Collection Time: 11/15/22  8:41 AM   Result Value Ref Range    Influenza A Ab positive (A)     Influenza B Ab neg    POCT COVID-19, Antigen    Collection Time: 11/15/22  8:41 AM   Result Value Ref Range    SARS-COV-2, POC Not-Detected Not Detected    Lot Number [id number]     QC Pass/Fail pass     Performing Instrument BD Veritor        Test Results Section   (All laboratory and radiology results have been personally reviewed by myself)  Labs:  Results for orders placed or performed in visit on 11/15/22 POCT Influenza A/B   Result Value Ref Range    Influenza A Ab positive (A)     Influenza B Ab neg    POCT COVID-19, Antigen   Result Value Ref Range    SARS-COV-2, POC Not-Detected Not Detected    Lot Number [id number]     QC Pass/Fail pass     Performing Instrument BD Veritor         Imaging: All Radiology results interpreted by Radiologist unless otherwise noted. No results found. Assessment / Plan   Impression(s):  Maxi Hernandez was seen today for fever. Diagnoses and all orders for this visit:    Influenza A  -     guaiFENesin (MUCINEX) 600 MG extended release tablet; Take 1 tablet by mouth 2 times daily for 15 days  -     benzonatate (TESSALON) 200 MG capsule; Take 1 capsule by mouth 3 times daily as needed for Cough  -     ibuprofen (IBU) 600 MG tablet; Take 1 tablet by mouth every 8 hours as needed for Pain    Fever, unspecified fever cause  -     POCT Influenza A/B  -     POCT COVID-19, Antigen  -     guaiFENesin (MUCINEX) 600 MG extended release tablet; Take 1 tablet by mouth 2 times daily for 15 days  -     benzonatate (TESSALON) 200 MG capsule; Take 1 capsule by mouth 3 times daily as needed for Cough  -     ibuprofen (IBU) 600 MG tablet; Take 1 tablet by mouth every 8 hours as needed for Pain      Discharged home. Patient condition is good    No follow-ups on file.      New Medications     New Prescriptions    BENZONATATE (TESSALON) 200 MG CAPSULE    Take 1 capsule by mouth 3 times daily as needed for Cough    GUAIFENESIN (MUCINEX) 600 MG EXTENDED RELEASE TABLET    Take 1 tablet by mouth 2 times daily for 15 days    IBUPROFEN (IBU) 600 MG TABLET    Take 1 tablet by mouth every 8 hours as needed for Pain       Electronically signed by Allison Herrera DO   DD: 11/15/22

## 2022-12-05 ENCOUNTER — OFFICE VISIT (OUTPATIENT)
Dept: FAMILY MEDICINE CLINIC | Age: 42
End: 2022-12-05
Payer: MEDICAID

## 2022-12-05 VITALS
OXYGEN SATURATION: 96 % | TEMPERATURE: 98.7 F | BODY MASS INDEX: 36.44 KG/M2 | HEART RATE: 84 BPM | WEIGHT: 198 LBS | HEIGHT: 62 IN

## 2022-12-05 DIAGNOSIS — J06.9 URI WITH COUGH AND CONGESTION: Primary | ICD-10-CM

## 2022-12-05 DIAGNOSIS — G93.31 POST VIRAL SYNDROME: ICD-10-CM

## 2022-12-05 PROCEDURE — 99213 OFFICE O/P EST LOW 20 MIN: CPT

## 2022-12-05 PROCEDURE — 4004F PT TOBACCO SCREEN RCVD TLK: CPT

## 2022-12-05 PROCEDURE — G8417 CALC BMI ABV UP PARAM F/U: HCPCS

## 2022-12-05 PROCEDURE — G8484 FLU IMMUNIZE NO ADMIN: HCPCS

## 2022-12-05 PROCEDURE — G8427 DOCREV CUR MEDS BY ELIG CLIN: HCPCS

## 2022-12-05 RX ORDER — METHYLPREDNISOLONE 4 MG/1
TABLET ORAL
Qty: 1 KIT | Refills: 0 | Status: SHIPPED | OUTPATIENT
Start: 2022-12-05

## 2022-12-05 RX ORDER — AZITHROMYCIN 250 MG/1
TABLET, FILM COATED ORAL
Qty: 6 TABLET | Refills: 0 | Status: SHIPPED | OUTPATIENT
Start: 2022-12-05

## 2022-12-05 NOTE — PROGRESS NOTES
Chief Complaint       Congestion (Pos flu last week, no improvement in sx ), Cough, and Fatigue    History of Present Illness   Source of history provided by:  patient. Ashley Swain is a 38 y.o. old female presenting to the walk in clinic for evaluation of sinus pressure, nasal congestion, discolored nasal drainage, bilateral ear pressure, mild productive cough x 2 weeks days. She did have influenza A at start of illness. Has been taking IBU and Mucinex OTC without relief. Denies any fever, chills, wheezing, CP, SOB, or GI symptoms. Denies any hx of asthma or COPD. Denies any contact with any individuals with known COVID-19 infection or under investigation for COVID-19 infection. Pt has been vaccinated for COVID-19. ROS    Unless otherwise stated in this report or unable to obtain because of the patient's clinical or mental status as evidenced by the medical record, this patients's positive and negative responses for Review of Systems, constitutional, psych, eyes, ENT, cardiovascular, respiratory, gastrointestinal, neurological, genitourinary, musculoskeletal, integument systems and systems related to the presenting problem are either stated in the preceding or were not pertinent or were negative for the symptoms and/or complaints related to the medical problem. Physical Exam         VS:  Pulse 84   Temp 98.7 °F (37.1 °C)   Ht 5' 2\" (1.575 m)   Wt 198 lb (89.8 kg)   LMP 08/31/2017 (Exact Date)   SpO2 96%   BMI 36.21 kg/m²    Oxygen Saturation Interpretation: Normal.    Constitutional:  Alert, development consistent with age. Head: No TTP over the sinuses. Ears:  External Ears: Bilateral pinna normal. TMs translucent without erythema or perforation bilaterally. Canals normal bilaterally without swelling or exudate  Nose:  Mild congestion of the nasal mucosa. There is no injection to middle turbinates bilaterally. Throat: Mild posterior pharyngeal erythema with mild post nasal drip present. No exudate or tonsillar hypertrophy noted. Neck:  Supple. There is no anterior cervical adenopathy. Lungs: CTAB without wheezes, rales, or rhonchi  Heart:  Regular rate and rhythm, normal heart sounds, without pathological murmurs, ectopy, gallops, or rubs. Skin:  Normal turgor. Warm, dry, without visible rash. Neurological:  Alert and oriented. Motor functions intact. Responds to verbal commands. Lab / Imaging Results   (All laboratory and radiology results have been personally reviewed by myself)  Labs:  No results found for this visit on 12/05/22. Imaging: All Radiology results interpreted by Radiologist unless otherwise noted. Assessment / Plan     Impression(s):  Ayse Salazar was seen today for congestion, cough and fatigue. Diagnoses and all orders for this visit:    URI with cough and congestion  -     azithromycin (ZITHROMAX) 250 MG tablet; Take 2 tablets by mouth on day 1, Take 1 tablet by mouth on days 2-5  -     methylPREDNISolone (MEDROL DOSEPACK) 4 MG tablet; Take by mouth. Post viral syndrome    Disposition:  Disposition: Discharge to home. Script written for azithromycin and dose pack, side effects discussed. Increase fluids and rest. Symptomatic relief discussed. F/u PCP in 5-7 days if symptoms persist. ED sooner if symptoms worsen or change. Red flag symptoms discussed. Pt is in agreement with this care plan. All questions answered. AFIA Cummings    **This report was transcribed using voice recognition software. Every effort was made to ensure accuracy; however, inadvertent computerized transcription errors may be present.

## 2023-03-06 ENCOUNTER — OFFICE VISIT (OUTPATIENT)
Dept: PRIMARY CARE CLINIC | Age: 43
End: 2023-03-06
Payer: MEDICAID

## 2023-03-06 VITALS
DIASTOLIC BLOOD PRESSURE: 92 MMHG | HEIGHT: 62 IN | WEIGHT: 204 LBS | TEMPERATURE: 97.3 F | SYSTOLIC BLOOD PRESSURE: 150 MMHG | BODY MASS INDEX: 37.54 KG/M2 | OXYGEN SATURATION: 100 % | HEART RATE: 100 BPM

## 2023-03-06 DIAGNOSIS — E27.8 ADRENAL NODULE (HCC): ICD-10-CM

## 2023-03-06 DIAGNOSIS — I10 PRIMARY HYPERTENSION: Primary | ICD-10-CM

## 2023-03-06 DIAGNOSIS — J32.9 CHRONIC SINUSITIS, UNSPECIFIED LOCATION: ICD-10-CM

## 2023-03-06 DIAGNOSIS — F41.9 ANXIETY: ICD-10-CM

## 2023-03-06 DIAGNOSIS — F90.9 ATTENTION DEFICIT HYPERACTIVITY DISORDER (ADHD), UNSPECIFIED ADHD TYPE: ICD-10-CM

## 2023-03-06 PROCEDURE — G8484 FLU IMMUNIZE NO ADMIN: HCPCS | Performed by: FAMILY MEDICINE

## 2023-03-06 PROCEDURE — 1036F TOBACCO NON-USER: CPT | Performed by: FAMILY MEDICINE

## 2023-03-06 PROCEDURE — 3077F SYST BP >= 140 MM HG: CPT | Performed by: FAMILY MEDICINE

## 2023-03-06 PROCEDURE — 99214 OFFICE O/P EST MOD 30 MIN: CPT | Performed by: FAMILY MEDICINE

## 2023-03-06 PROCEDURE — 3080F DIAST BP >= 90 MM HG: CPT | Performed by: FAMILY MEDICINE

## 2023-03-06 PROCEDURE — G8427 DOCREV CUR MEDS BY ELIG CLIN: HCPCS | Performed by: FAMILY MEDICINE

## 2023-03-06 PROCEDURE — G8417 CALC BMI ABV UP PARAM F/U: HCPCS | Performed by: FAMILY MEDICINE

## 2023-03-06 RX ORDER — LISDEXAMFETAMINE DIMESYLATE 30 MG/1
CAPSULE ORAL
COMMUNITY
Start: 2023-02-17

## 2023-03-06 RX ORDER — AMLODIPINE BESYLATE 5 MG/1
5 TABLET ORAL DAILY
Qty: 30 TABLET | Refills: 3 | Status: SHIPPED | OUTPATIENT
Start: 2023-03-06

## 2023-03-06 RX ORDER — AZELASTINE 1 MG/ML
1 SPRAY, METERED NASAL 2 TIMES DAILY
Qty: 60 ML | Refills: 1 | Status: SHIPPED | OUTPATIENT
Start: 2023-03-06

## 2023-03-06 RX ORDER — ARIPIPRAZOLE 5 MG/1
TABLET ORAL
COMMUNITY
Start: 2023-02-17

## 2023-03-06 RX ORDER — FLUTICASONE PROPIONATE 50 MCG
1 SPRAY, SUSPENSION (ML) NASAL DAILY
Qty: 16 G | Refills: 2 | Status: SHIPPED | OUTPATIENT
Start: 2023-03-06

## 2023-03-06 SDOH — ECONOMIC STABILITY: HOUSING INSECURITY
IN THE LAST 12 MONTHS, WAS THERE A TIME WHEN YOU DID NOT HAVE A STEADY PLACE TO SLEEP OR SLEPT IN A SHELTER (INCLUDING NOW)?: NO

## 2023-03-06 SDOH — ECONOMIC STABILITY: INCOME INSECURITY: HOW HARD IS IT FOR YOU TO PAY FOR THE VERY BASICS LIKE FOOD, HOUSING, MEDICAL CARE, AND HEATING?: NOT HARD AT ALL

## 2023-03-06 SDOH — ECONOMIC STABILITY: FOOD INSECURITY: WITHIN THE PAST 12 MONTHS, THE FOOD YOU BOUGHT JUST DIDN'T LAST AND YOU DIDN'T HAVE MONEY TO GET MORE.: NEVER TRUE

## 2023-03-06 SDOH — ECONOMIC STABILITY: FOOD INSECURITY: WITHIN THE PAST 12 MONTHS, YOU WORRIED THAT YOUR FOOD WOULD RUN OUT BEFORE YOU GOT MONEY TO BUY MORE.: NEVER TRUE

## 2023-03-06 ASSESSMENT — ENCOUNTER SYMPTOMS
NAUSEA: 0
COUGH: 0
DIARRHEA: 0
PHOTOPHOBIA: 0
CONSTIPATION: 0
ABDOMINAL PAIN: 0
SHORTNESS OF BREATH: 0
VOMITING: 0
BACK PAIN: 0
SORE THROAT: 0
BLOOD IN STOOL: 0

## 2023-03-06 ASSESSMENT — PATIENT HEALTH QUESTIONNAIRE - PHQ9
2. FEELING DOWN, DEPRESSED OR HOPELESS: 2
SUM OF ALL RESPONSES TO PHQ QUESTIONS 1-9: 2

## 2023-03-06 NOTE — PROGRESS NOTES
Gunjan Caal (:  1980) is a 38 y.o. female,New patient, here for evaluation of the following chief complaint(s):  New Patient and Establish Care         ASSESSMENT/PLAN:  1. Primary hypertension  -     amLODIPine (NORVASC) 5 MG tablet; Take 1 tablet by mouth daily, Disp-30 tablet, R-3Normal  2. Chronic sinusitis, unspecified location  -     azelastine (ASTELIN) 0.1 % nasal spray; 1 spray by Nasal route 2 times daily Use in each nostril as directed, Disp-60 mL, R-1Normal  -     fluticasone (FLONASE) 50 MCG/ACT nasal spray; 1 spray by Each Nostril route daily, Disp-16 g, R-2Normal  3. Adrenal nodule ([address]  4. Anxiety  5. Attention deficit hyperactivity disorder (ADHD), unspecified ADHD type  We will start her back on low-dose Norvasc with continued blood pressure monitoring. We will need to start her on something due to the slightly elevated blood pressures and current Vyvanse use. Also treat with Astelin and Flonase for the chronic sinusitis and dog allergy. See her back in 6 weeks for further evaluation and treatment. Return in about 6 weeks (around 2023). Subjective   SUBJECTIVE/OBJECTIVE:  HPI  Presents today to establish with new PCP. Previous PCP is retiring. Patient stopped taking her blood pressure medication because there was no response with previous treatment. Also it did not help her anxiety. She also has been going to see a psychiatrist.  Currently on Abilify and Vyvanse and states that her symptoms are under much better control than previous. Recently stopped smoking 17 days ago due to her mother's cancer diagnosis. Has gained 6 pounds in that time. Currently having more issues with allergies to her [address]. Flonase helps somewhat. Not taking any other medications at this time. Review of Systems   Constitutional:  Positive for unexpected weight change. Negative for chills and fever. HENT:  Negative for congestion, hearing loss, nosebleeds and sore throat. Eyes:  Negative for photophobia. Respiratory:  Negative for cough and shortness of breath. Cardiovascular:  Negative for chest pain, palpitations and leg swelling. Gastrointestinal:  Negative for abdominal pain, blood in stool, constipation, diarrhea, nausea and vomiting. Endocrine: Negative for polydipsia. Genitourinary:  Negative for dysuria, frequency, hematuria and urgency. Musculoskeletal:  Negative for back pain and myalgias. Skin: Negative. Neurological:  Negative for dizziness, tremors, weakness and headaches. Hematological:  Does not bruise/bleed easily. Psychiatric/Behavioral:  Positive for decreased concentration. Negative for hallucinations and suicidal ideas. The patient is nervous/anxious. All other systems reviewed and are negative.        Current Outpatient Medications:     ARIPiprazole (ABILIFY) 5 MG tablet, , Disp: , Rfl:     VYVANSE 30 MG capsule, , Disp: , Rfl:     amLODIPine (NORVASC) 5 MG tablet, Take 1 tablet by mouth daily, Disp: 30 tablet, Rfl: 3    azelastine (ASTELIN) 0.1 % nasal spray, 1 spray by Nasal route 2 times daily Use in each nostril as directed, Disp: 60 mL, Rfl: 1    fluticasone (FLONASE) 50 MCG/ACT nasal spray, 1 spray by Each Nostril route daily, Disp: 16 g, Rfl: 2    metoprolol succinate (TOPROL XL) 50 MG extended release tablet, TAKE 1 TABLET BY MOUTH EVERY EVENING (Patient not taking: Reported on 3/6/2023), Disp: 30 tablet, Rfl: 1    losartan (COZAAR) 50 MG tablet, Take 0.5 tablets by mouth nightly (Patient not taking: Reported on 3/6/2023), Disp: 60 tablet, Rfl: 1   Patient Active Problem List   Diagnosis    Suspected damage to fetus from drugs,(exposure to general anesthesia) affecting management of mother, antepartum    Pregnancy with history of pre-term labor    H/O LEEP (loop electrosurgical excision procedure) of cervix complicating pregnancy    Current smoker    Previous  delivery, antepartum condition or complication    Obesity (BMI 30-39. 9)    Skin lesion    Other congenital or acquired abnormality of cervix, antepartum condition or complication    Gestational diabetes mellitus, antepartum    Short cervix affecting pregnancy    GDM, class A1    Abdominal abscess    Class 2 obesity due to excess calories without serious comorbidity with body mass index (BMI) of 39.0 to 39.9 in adult    Anxiety    Primary hypertension    Urinary incontinence    History of hysterectomy    Stress incontinence    Adrenal nodule (HCC)    Chronic left-sided thoracic back pain     Past Medical History:   Diagnosis Date    Abnormal Pap smear     Abscess     on legs in past, last on 10/1/16 no current abscess, no drainage    Adrenal nodule (HCC)     Anesthesia     pt wakes up \" freaking out \" per pt    Cervical cancer ([facility] [address]     Chronic left-sided thoracic back pain     Class 2 obesity due to excess calories without serious comorbidity with body mass index (BMI) of 39.0 to 39.9 in adult     Gestational diabetes mellitus, antepartum 2012    no current issues, diet controlled    Primary hypertension     Stress incontinence      Past Surgical History:   Procedure Laterality Date     SECTION  2010    DILATION AND CURETTAGE      suction D&C    DILATION AND CURETTAGE OF UTERUS  10/2/2009    DILATION AND CURETTAGE OF UTERUS  2016    andrew novosure ablation    HYSTERECTOMY, TOTAL ABDOMINAL (CERVIX REMOVED)      [facility]    LEEP      OTHER SURGICAL HISTORY  2012    excision lower sinus tract abdomen    OTHER SURGICAL HISTORY  2012    [facility]    OTHER SURGICAL HISTORY  2001    SICK 14 D.  HOSPITALIZED AFTER 1ST BABY    OTHER SURGICAL HISTORY  10/18/2017    [facility]     Social History     Socioeconomic History    Marital status:      Spouse name: Not on file    Number of children: Not on file    Years of education: Not on file    Highest education level: Not on file   Occupational History    Not on file   Tobacco Use    Smoking status: Former Packs/day: 1.00     Years: 15.00     Pack years: 15.00     Types: Cigarettes     Quit date: 2023     Years since quittin.0    Smokeless tobacco: Never   Vaping Use    Vaping Use: Never used   Substance and Sexual Activity    Alcohol use: Yes     Alcohol/week: 0.0 standard drinks     Comment: rare    Drug use: No    Sexual activity: Yes     Partners: Male     Birth control/protection: Surgical     Comment: ESSURE   Other Topics Concern    Not on file   Social History Narrative    Not on file     Social Determinants of Health     Financial Resource Strain: Low Risk     Difficulty of Paying Living Expenses: Not hard at all   Food Insecurity: No Food Insecurity    Worried About [address] [employer] in the Last Year: Never true    [address] in the Last Year: Never true   Transportation Needs: Unknown    Lack of Transportation (Medical): Not on file    Lack of Transportation (Non-Medical): No   Physical Activity: Not on file   Stress: Not on file   Social Connections: Not on file   Intimate Partner Violence: Not on file   Housing Stability: Unknown    Unable to Pay for Housing in the Last Year: Not on file    Number of Places Lived in the Last Year: Not on file    Unstable Housing in the Last Year: No     Family History   Problem Relation Age of Onset    Cancer Mother     Arthritis Mother     High Blood Pressure Mother     Asthma Mother     Heart Disease Maternal Grandmother     Diabetes Paternal Grandmother     Heart Failure Paternal Grandmother       There are no preventive care reminders to display for this patient. There are no preventive care reminders to display for this patient. There are no preventive care reminders to display for this patient.    Health Maintenance Due   Topic    DTaP/Tdap/Td vaccine (1 - Tdap)      Health Maintenance   Topic Date Due    Varicella vaccine (1 of 2 - 2-dose childhood series) Never done    Pneumococcal 0-64 years Vaccine (1 - PCV) Never done    HIV screen  Never done Hepatitis C screen  Never done    DTaP/Tdap/Td vaccine (1 - Tdap) Never done    COVID-19 Vaccine (2 - Booster for Clementina series) 09/13/2021    Flu vaccine (1) Never done    Depression Screen  10/13/2022    Diabetes screen  10/11/2024    Lipids  10/11/2026    Hepatitis A vaccine  Aged Out    Hib vaccine  Aged Out    Meningococcal (ACWY) vaccine  Aged Out      There are no preventive care reminders to display for this patient. There are no preventive care reminders to display for this patient. BP (!) 150/92   Pulse 100   Temp 97.3 °F (36.3 °C)   Ht 5' 2\" (1.575 m)   Wt 204 lb (92.5 kg)   LMP 08/31/2017 (Exact Date)   SpO2 100%   BMI 37.31 kg/m²     Objective   Physical Exam  Vitals reviewed. Constitutional:       Appearance: She is obese. HENT:      Head: Normocephalic and atraumatic. Right Ear: A middle ear effusion is present. Tympanic membrane is bulging. Left Ear: A middle ear effusion is present. Tympanic membrane is bulging. Eyes:      General: No scleral icterus. Extraocular Movements: Extraocular movements intact. Conjunctiva/sclera: Conjunctivae normal.      Pupils: Pupils are equal, round, and reactive to light. Neck:      Thyroid: No thyromegaly. Cardiovascular:      Rate and Rhythm: Normal rate and regular rhythm. Heart sounds: Normal heart sounds. No murmur heard. Pulmonary:      Effort: Pulmonary effort is normal.      Breath sounds: Normal breath sounds. No rales. Abdominal:      General: Bowel sounds are normal. There is no distension. Palpations: Abdomen is soft. Tenderness: There is no abdominal tenderness. Musculoskeletal:         General: Normal range of motion. Cervical back: Neck supple. Right lower leg: No edema. Left lower leg: No edema. Lymphadenopathy:      Cervical: No cervical adenopathy. Skin:     General: Skin is warm and dry. Findings: No erythema or rash.    Neurological:      Mental Status: She is alert and oriented to person, place, and time.      Cranial Nerves: No cranial nerve deficit.   Psychiatric:         Judgment: Judgment normal.                An electronic signature was used to authenticate this note.    --Silas Conklin, DO

## 2023-05-04 ENCOUNTER — OFFICE VISIT (OUTPATIENT)
Dept: PRIMARY CARE CLINIC | Age: 43
End: 2023-05-04
Payer: MEDICAID

## 2023-05-04 VITALS
WEIGHT: 205 LBS | DIASTOLIC BLOOD PRESSURE: 88 MMHG | TEMPERATURE: 98.4 F | OXYGEN SATURATION: 98 % | SYSTOLIC BLOOD PRESSURE: 146 MMHG | HEIGHT: 62 IN | HEART RATE: 93 BPM | BODY MASS INDEX: 37.73 KG/M2

## 2023-05-04 DIAGNOSIS — I10 PRIMARY HYPERTENSION: ICD-10-CM

## 2023-05-04 DIAGNOSIS — E27.8 ADRENAL NODULE (HCC): Primary | ICD-10-CM

## 2023-05-04 PROBLEM — E66.09 CLASS 2 OBESITY DUE TO EXCESS CALORIES WITHOUT SERIOUS COMORBIDITY WITH BODY MASS INDEX (BMI) OF 39.0 TO 39.9 IN ADULT: Status: RESOLVED | Noted: 2020-09-16 | Resolved: 2023-05-04

## 2023-05-04 PROBLEM — E66.812 CLASS 2 OBESITY DUE TO EXCESS CALORIES WITHOUT SERIOUS COMORBIDITY WITH BODY MASS INDEX (BMI) OF 39.0 TO 39.9 IN ADULT: Status: RESOLVED | Noted: 2020-09-16 | Resolved: 2023-05-04

## 2023-05-04 PROCEDURE — 99213 OFFICE O/P EST LOW 20 MIN: CPT | Performed by: FAMILY MEDICINE

## 2023-05-04 PROCEDURE — 3079F DIAST BP 80-89 MM HG: CPT | Performed by: FAMILY MEDICINE

## 2023-05-04 PROCEDURE — 1036F TOBACCO NON-USER: CPT | Performed by: FAMILY MEDICINE

## 2023-05-04 PROCEDURE — G8417 CALC BMI ABV UP PARAM F/U: HCPCS | Performed by: FAMILY MEDICINE

## 2023-05-04 PROCEDURE — 3077F SYST BP >= 140 MM HG: CPT | Performed by: FAMILY MEDICINE

## 2023-05-04 PROCEDURE — G8427 DOCREV CUR MEDS BY ELIG CLIN: HCPCS | Performed by: FAMILY MEDICINE

## 2023-05-04 RX ORDER — AMLODIPINE BESYLATE 10 MG/1
10 TABLET ORAL DAILY
Qty: 30 TABLET | Refills: 5 | Status: SHIPPED | OUTPATIENT
Start: 2023-05-04

## 2023-05-04 RX ORDER — LISDEXAMFETAMINE DIMESYLATE 50 MG
CAPSULE ORAL
COMMUNITY
Start: 2023-04-21

## 2023-05-04 ASSESSMENT — ENCOUNTER SYMPTOMS
VOMITING: 0
ABDOMINAL PAIN: 0
BACK PAIN: 0
PHOTOPHOBIA: 0
SHORTNESS OF BREATH: 0
BLOOD IN STOOL: 0
SORE THROAT: 0
DIARRHEA: 0
COUGH: 0
NAUSEA: 0
CONSTIPATION: 0

## 2023-05-04 NOTE — PROGRESS NOTES
Difficulty of Paying Living Expenses: Not hard at all   Food Insecurity: No Food Insecurity    Worried About [address] in the Last Year: Never true    Ran Out of Food in the Last Year: Never true   Transportation Needs: Unknown    Lack of Transportation (Medical): Not on file    Lack of Transportation (Non-Medical): No   Physical Activity: Not on file   Stress: Not on file   Social Connections: Not on file   Intimate Partner Violence: Not on file   Housing Stability: Unknown    Unable to Pay for Housing in the Last Year: Not on file    Number of Places Lived in the Last Year: Not on file    Unstable Housing in the Last Year: No     Family History   Problem Relation Age of Onset    Cancer Mother         sclc    Arthritis Mother     High Blood Pressure Mother     Asthma Mother     Heart Disease Maternal Grandmother     Diabetes Paternal Grandmother     Heart Failure Paternal Grandmother       There are no preventive care reminders to display for this patient. There are no preventive care reminders to display for this patient. There are no preventive care reminders to display for this patient. Health Maintenance Due   Topic    DTaP/Tdap/Td vaccine (1 - Tdap)      Health Maintenance   Topic Date Due    Varicella vaccine (1 of 2 - 2-dose childhood series) Never done    HIV screen  Never done    Hepatitis C screen  Never done    DTaP/Tdap/Td vaccine (1 - Tdap) Never done    COVID-19 Vaccine (2 - Booster for Clementina series) 09/13/2021    Flu vaccine (Season Ended) 08/01/2023    Depression Screen  03/06/2024    Diabetes screen  10/11/2024    Lipids  10/11/2026    Hepatitis A vaccine  Aged Out    Hib vaccine  Aged Out    Meningococcal (ACWY) vaccine  Aged Out    Pneumococcal 0-64 years Vaccine  Aged Out    Cervical cancer screen  Discontinued      There are no preventive care reminders to display for this patient. There are no preventive care reminders to display for this patient.      BP (!) 146/88   Pulse

## 2023-07-05 ENCOUNTER — OFFICE VISIT (OUTPATIENT)
Dept: FAMILY MEDICINE CLINIC | Age: 43
End: 2023-07-05
Payer: MEDICAID

## 2023-07-05 VITALS
HEART RATE: 95 BPM | WEIGHT: 207 LBS | DIASTOLIC BLOOD PRESSURE: 80 MMHG | SYSTOLIC BLOOD PRESSURE: 122 MMHG | TEMPERATURE: 97.7 F | OXYGEN SATURATION: 96 % | BODY MASS INDEX: 37.86 KG/M2

## 2023-07-05 DIAGNOSIS — S83.8X2A ACUTE MEDIAL MENISCAL INJURY OF LEFT KNEE, INITIAL ENCOUNTER: Primary | ICD-10-CM

## 2023-07-05 PROCEDURE — 99213 OFFICE O/P EST LOW 20 MIN: CPT | Performed by: FAMILY MEDICINE

## 2023-07-05 PROCEDURE — 3074F SYST BP LT 130 MM HG: CPT | Performed by: FAMILY MEDICINE

## 2023-07-05 PROCEDURE — G8417 CALC BMI ABV UP PARAM F/U: HCPCS | Performed by: FAMILY MEDICINE

## 2023-07-05 PROCEDURE — 1036F TOBACCO NON-USER: CPT | Performed by: FAMILY MEDICINE

## 2023-07-05 PROCEDURE — G8427 DOCREV CUR MEDS BY ELIG CLIN: HCPCS | Performed by: FAMILY MEDICINE

## 2023-07-05 PROCEDURE — 3079F DIAST BP 80-89 MM HG: CPT | Performed by: FAMILY MEDICINE

## 2023-07-05 RX ORDER — PREDNISONE 10 MG/1
TABLET ORAL
Qty: 30 TABLET | Refills: 0 | Status: SHIPPED | OUTPATIENT
Start: 2023-07-05

## 2023-07-05 ASSESSMENT — ENCOUNTER SYMPTOMS
RESPIRATORY NEGATIVE: 1
GASTROINTESTINAL NEGATIVE: 1
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
## 2023-10-21 ENCOUNTER — APPOINTMENT (OUTPATIENT)
Dept: CT IMAGING | Age: 43
End: 2023-10-21
Payer: MEDICAID

## 2023-10-21 ENCOUNTER — HOSPITAL ENCOUNTER (EMERGENCY)
Age: 43
Discharge: HOME | End: 2023-10-21
Attending: EMERGENCY MEDICINE
Payer: MEDICAID

## 2023-10-21 VITALS
HEART RATE: 117 BPM | SYSTOLIC BLOOD PRESSURE: 185 MMHG | DIASTOLIC BLOOD PRESSURE: 88 MMHG | TEMPERATURE: 98.3 F | OXYGEN SATURATION: 98 % | RESPIRATION RATE: 20 BRPM

## 2023-10-21 DIAGNOSIS — S01.81XA FACIAL LACERATION, INITIAL ENCOUNTER: ICD-10-CM

## 2023-10-21 DIAGNOSIS — S00.83XA FACIAL HEMATOMA, INITIAL ENCOUNTER: ICD-10-CM

## 2023-10-21 DIAGNOSIS — E04.1 THYROID NODULE: ICD-10-CM

## 2023-10-21 DIAGNOSIS — S09.90XA INJURY OF HEAD, INITIAL ENCOUNTER: Primary | ICD-10-CM

## 2023-10-21 PROCEDURE — 72125 CT NECK SPINE W/O DYE: CPT

## 2023-10-21 PROCEDURE — 99284 EMERGENCY DEPT VISIT MOD MDM: CPT

## 2023-10-21 PROCEDURE — 90714 TD VACC NO PRESV 7 YRS+ IM: CPT | Performed by: EMERGENCY MEDICINE

## 2023-10-21 PROCEDURE — 6370000000 HC RX 637 (ALT 250 FOR IP): Performed by: EMERGENCY MEDICINE

## 2023-10-21 PROCEDURE — 12011 RPR F/E/E/N/L/M 2.5 CM/<: CPT

## 2023-10-21 PROCEDURE — 70486 CT MAXILLOFACIAL W/O DYE: CPT

## 2023-10-21 PROCEDURE — 6360000002 HC RX W HCPCS: Performed by: EMERGENCY MEDICINE

## 2023-10-21 PROCEDURE — 70450 CT HEAD/BRAIN W/O DYE: CPT

## 2023-10-21 PROCEDURE — 90471 IMMUNIZATION ADMIN: CPT | Performed by: EMERGENCY MEDICINE

## 2023-10-21 RX ORDER — CLINDAMYCIN HYDROCHLORIDE 300 MG/1
300 CAPSULE ORAL 3 TIMES DAILY
Qty: 21 CAPSULE | Refills: 0 | Status: SHIPPED | OUTPATIENT
Start: 2023-10-21 | End: 2023-10-28

## 2023-10-21 RX ORDER — TETANUS AND DIPHTHERIA TOXOIDS ADSORBED 2; 2 [LF]/.5ML; [LF]/.5ML
0.5 INJECTION INTRAMUSCULAR ONCE
Status: DISCONTINUED | OUTPATIENT
Start: 2023-10-21 | End: 2023-10-21 | Stop reason: HOSPADM

## 2023-10-21 RX ORDER — TETANUS AND DIPHTHERIA TOXOIDS ADSORBED 2; 2 [LF]/.5ML; [LF]/.5ML
0.5 INJECTION INTRAMUSCULAR ONCE
Status: COMPLETED | OUTPATIENT
Start: 2023-10-21 | End: 2023-10-21

## 2023-10-21 RX ORDER — CLINDAMYCIN HYDROCHLORIDE 150 MG/1
300 CAPSULE ORAL ONCE
Status: COMPLETED | OUTPATIENT
Start: 2023-10-21 | End: 2023-10-21

## 2023-10-21 RX ORDER — BACITRACIN ZINC 500 [USP'U]/G
OINTMENT TOPICAL ONCE
Status: DISCONTINUED | OUTPATIENT
Start: 2023-10-21 | End: 2023-10-21

## 2023-10-21 RX ORDER — OXYMETAZOLINE HYDROCHLORIDE 0.05 G/100ML
2 SPRAY NASAL ONCE
Status: DISCONTINUED | OUTPATIENT
Start: 2023-10-21 | End: 2023-10-21

## 2023-10-21 RX ADMIN — CLINDAMYCIN HYDROCHLORIDE 300 MG: 150 CAPSULE ORAL at 04:21

## 2023-10-21 RX ADMIN — Medication: at 04:23

## 2023-10-21 RX ADMIN — TETANUS AND DIPHTHERIA TOXOIDS ADSORBED 0.5 ML: 2; 2 INJECTION INTRAMUSCULAR at 04:23

## 2023-10-21 ASSESSMENT — LIFESTYLE VARIABLES: HOW OFTEN DO YOU HAVE A DRINK CONTAINING ALCOHOL: MONTHLY OR LESS

## 2023-10-21 ASSESSMENT — PAIN - FUNCTIONAL ASSESSMENT
PAIN_FUNCTIONAL_ASSESSMENT: NONE - DENIES PAIN
PAIN_FUNCTIONAL_ASSESSMENT: NONE - DENIES PAIN

## 2023-10-21 NOTE — DISCHARGE INSTRUCTIONS
Return if increased headache fevers vomiting pus redness and swelling or any other symptoms follow-up with thyroid nodule as an outpatient
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
## 2023-10-23 ENCOUNTER — TELEPHONE (OUTPATIENT)
Dept: PRIMARY CARE CLINIC | Age: 43
End: 2023-10-23
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
## 2023-11-02 ENCOUNTER — OFFICE VISIT (OUTPATIENT)
Dept: PRIMARY CARE CLINIC | Age: 43
End: 2023-11-02
Payer: MEDICAID

## 2023-11-02 VITALS
DIASTOLIC BLOOD PRESSURE: 94 MMHG | OXYGEN SATURATION: 99 % | TEMPERATURE: 97.7 F | WEIGHT: 206 LBS | HEIGHT: 62 IN | HEART RATE: 113 BPM | SYSTOLIC BLOOD PRESSURE: 162 MMHG | BODY MASS INDEX: 37.91 KG/M2

## 2023-11-02 DIAGNOSIS — S01.81XD FACIAL LACERATION, SUBSEQUENT ENCOUNTER: ICD-10-CM

## 2023-11-02 DIAGNOSIS — F43.12 CHRONIC POST-TRAUMATIC STRESS DISORDER (PTSD): ICD-10-CM

## 2023-11-02 DIAGNOSIS — S09.90XD INJURY OF HEAD, SUBSEQUENT ENCOUNTER: ICD-10-CM

## 2023-11-02 DIAGNOSIS — E04.1 THYROID NODULE: ICD-10-CM

## 2023-11-02 DIAGNOSIS — I10 PRIMARY HYPERTENSION: ICD-10-CM
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
## 2023-11-02 DIAGNOSIS — T14.90XA TRAUMA: Primary | ICD-10-CM

## 2023-11-02 DIAGNOSIS — T14.90XA TRAUMA: ICD-10-CM

## 2023-11-02 DIAGNOSIS — S00.83XD FACIAL HEMATOMA, SUBSEQUENT ENCOUNTER: ICD-10-CM

## 2023-11-02 LAB
ABSOLUTE IMMATURE GRANULOCYTE: 0.03 K/UL (ref 0–0.58)
ALBUMIN SERPL-MCNC: 4.2 G/DL (ref 3.5–5.2)
ALP BLD-CCNC: 74 U/L (ref 35–104)
ALT SERPL-CCNC: 54 U/L (ref 0–32)
ANION GAP SERPL CALCULATED.3IONS-SCNC: 18 MMOL/L (ref 7–16)
AST SERPL-CCNC: 32 U/L (ref 0–31)
BACTERIA: ABNORMAL
BASOPHILS ABSOLUTE: 0.06 K/UL (ref 0–0.2)
BASOPHILS RELATIVE PERCENT: 1 % (ref 0–2)
BILIRUB SERPL-MCNC: 0.7 MG/DL (ref 0–1.2)
BILIRUBIN DIRECT: <0.2 MG/DL (ref 0–0.3)
BILIRUBIN URINE: NEGATIVE
BILIRUBIN, INDIRECT: ABNORMAL MG/DL (ref 0–1)
BUN BLDV-MCNC: 11 MG/DL (ref 6–20)
CALCIUM SERPL-MCNC: 9.2 MG/DL (ref 8.6–10.2)
CHLORIDE BLD-SCNC: 100 MMOL/L (ref 98–107)
CHOLESTEROL: 183 MG/DL
CO2: 20 MMOL/L (ref 22–29)
COLOR: YELLOW
CREAT SERPL-MCNC: 0.7 MG/DL (ref 0.5–1)
CREATININE URINE: 264.3 MG/DL (ref 29–226)
CRYSTALS, UA: ABNORMAL /HPF
EOSINOPHILS ABSOLUTE: 0.07 K/UL (ref 0.05–0.5)
EOSINOPHILS RELATIVE PERCENT: 1 % (ref 0–6)
EPITHELIAL CELLS UA: ABNORMAL /HPF
GFR SERPL CREATININE-BSD FRML MDRD: >60 ML/MIN/1.73M2
GLUCOSE BLD-MCNC: 73 MG/DL (ref 74–99)
GLUCOSE URINE: NEGATIVE MG/DL
HBA1C MFR BLD: 5.3 % (ref 4–5.6)
HCT VFR BLD CALC: 47.8 % (ref 34–48)
HDLC SERPL-MCNC: 42 MG/DL
HEMOGLOBIN: 15.8 G/DL (ref 11.5–15.5)
IMMATURE GRANULOCYTES: 0 % (ref 0–5)
KETONES, URINE: ABNORMAL MG/DL
LDL CHOLESTEROL: 124 MG/DL
LEUKOCYTE ESTERASE, URINE: NEGATIVE
LYMPHOCYTES ABSOLUTE: 2.45 K/UL (ref 1.5–4)
LYMPHOCYTES RELATIVE PERCENT: 30 % (ref 20–42)
MCH RBC QN AUTO: 30 PG (ref 26–35)
MCHC RBC AUTO-ENTMCNC: 33.1 G/DL (ref 32–34.5)
MCV RBC AUTO: 90.9 FL (ref 80–99.9)
MICROALBUMIN/CREAT 24H UR: 43 MG/L (ref 0–19)
MICROALBUMIN/CREAT UR-RTO: 16 MCG/MG CREAT (ref 0–30)
MONOCYTES ABSOLUTE: 0.6 K/UL (ref 0.1–0.95)
MONOCYTES RELATIVE PERCENT: 7 % (ref 2–12)
MUCUS: PRESENT
NEUTROPHILS ABSOLUTE: 5.02 K/UL (ref 1.8–7.3)
NEUTROPHILS RELATIVE PERCENT: 61 % (ref 43–80)
NITRITE, URINE: NEGATIVE
PDW BLD-RTO: 12.7 % (ref 11.5–15)
PH UA: 5.5 (ref 5–9)
PLATELET # BLD: 259 K/UL (ref 130–450)
PMV BLD AUTO: 13 FL (ref 7–12)
POTASSIUM SERPL-SCNC: 3.8 MMOL/L (ref 3.5–5)
PROTEIN UA: NEGATIVE MG/DL
RBC # BLD: 5.26 M/UL (ref 3.5–5.5)
RBC UA: ABNORMAL /HPF
SODIUM BLD-SCNC: 138 MMOL/L (ref 132–146)
SPECIFIC GRAVITY UA: >1.03 (ref 1–1.03)
T4 FREE: 1.3 NG/DL (ref 0.9–1.7)
TOTAL PROTEIN: 7.6 G/DL (ref 6.4–8.3)
TRIGL SERPL-MCNC: 83 MG/DL
TSH SERPL DL<=0.05 MIU/L-ACNC: 1.16 UIU/ML (ref 0.27–4.2)
TURBIDITY: CLEAR
URIC ACID: 6 MG/DL (ref 2.4–5.7)
URINE HGB: NEGATIVE
UROBILINOGEN, URINE: 0.2 EU/DL (ref 0–1)
VLDLC SERPL CALC-MCNC: 17 MG/DL
WBC # BLD: 8.2 K/UL (ref 4.5–11.5)
WBC UA: ABNORMAL /HPF

## 2023-11-02 PROCEDURE — 96372 THER/PROPH/DIAG INJ SC/IM: CPT | Performed by: FAMILY MEDICINE

## 2023-11-02 PROCEDURE — 3077F SYST BP >= 140 MM HG: CPT | Performed by: FAMILY MEDICINE

## 2023-11-02 PROCEDURE — 3080F DIAST BP >= 90 MM HG: CPT | Performed by: FAMILY MEDICINE

## 2023-11-02 PROCEDURE — G8427 DOCREV CUR MEDS BY ELIG CLIN: HCPCS | Performed by: FAMILY MEDICINE

## 2023-11-02 PROCEDURE — 1036F TOBACCO NON-USER: CPT | Performed by: FAMILY MEDICINE

## 2023-11-02 PROCEDURE — 99214 OFFICE O/P EST MOD 30 MIN: CPT | Performed by: FAMILY MEDICINE

## 2023-11-02 PROCEDURE — G8417 CALC BMI ABV UP PARAM F/U: HCPCS | Performed by: FAMILY MEDICINE

## 2023-11-02 PROCEDURE — G8484 FLU IMMUNIZE NO ADMIN: HCPCS | Performed by: FAMILY MEDICINE

## 2023-11-02 RX ORDER — KETOROLAC TROMETHAMINE 30 MG/ML
30 INJECTION, SOLUTION INTRAMUSCULAR; INTRAVENOUS ONCE
Status: COMPLETED | OUTPATIENT
Start: 2023-11-02 | End: 2023-11-02

## 2023-11-02 RX ORDER — AMLODIPINE BESYLATE 10 MG/1
10 TABLET ORAL DAILY
Qty: 30 TABLET | Refills: 5 | Status: SHIPPED | OUTPATIENT
Start: 2023-11-02

## 2023-11-02 RX ORDER — CLONIDINE HYDROCHLORIDE 0.1 MG/1
0.1 TABLET, EXTENDED RELEASE ORAL DAILY
COMMUNITY
Start: 2023-09-19

## 2023-11-02 RX ORDER — HYDROCODONE BITARTRATE AND ACETAMINOPHEN 5; 325 MG/1; MG/1
1 TABLET ORAL EVERY 4 HOURS PRN
Qty: 42 TABLET | Refills: 0 | Status: SHIPPED | OUTPATIENT
Start: 2023-11-02 | End: 2023-11-09

## 2023-11-02 RX ADMIN — KETOROLAC TROMETHAMINE 30 MG: 30 INJECTION, SOLUTION INTRAMUSCULAR; INTRAVENOUS at 11:20

## 2023-11-02 ASSESSMENT — ENCOUNTER SYMPTOMS
VOMITING: 0
COUGH: 0
PHOTOPHOBIA: 0
SORE THROAT: 0
DIARRHEA: 0
BACK PAIN: 0
BLOOD IN STOOL: 0
CONSTIPATION: 0
ABDOMINAL PAIN: 0
NAUSEA: 0
SHORTNESS OF BREATH: 0
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
## 2023-11-03 LAB
6-MONOACETYLMORPHINE, URINE: NEGATIVE
ABNORMAL SPECIMEN VALIDITY TEST: ABNORMAL
ALCOHOL URINE: NOT DETECTED MG/DL
AMPHETAMINE QUANTITATIVE URINE: >1000 NG/ML
AMPHETAMINE SCREEN URINE: POSITIVE
BARBITURATE SCREEN URINE: NEGATIVE
BENZODIAZEPINE SCREEN, URINE: NEGATIVE
BUPRENORPHINE URINE: NEGATIVE
CANNABINOID SCREEN URINE: NEGATIVE
COCAINE METABOLITE, URINE: NEGATIVE
COMPLIANCE DRUG ANALYSIS, URINE: NORMAL
EPHEDRINE, QUANTITATIVE, URINE: <100 NG/ML
FENTANYL URINE: NEGATIVE
INTEGRITY CHECK, CREATININE, URINE: 258.8 MG/DL (ref 22–250)
INTEGRITY CHECK, OXIDANT, URINE: 24 MG/L
INTEGRITY CHECK, PH, URINE: 5.3 (ref 4.5–9)
INTEGRITY CHECK, SPECIFIC GRAVITY, URINE: 1.03 (ref 1–1.03)
MDA, QUANTITATIVE, URINE: <100 NG/ML
MDEA, QUANTITATIVE, URINE: <100 NG/ML
MDMA, QUANTITATIVE, URINE: <100 NG/ML
METHADONE SCREEN, URINE: NEGATIVE
METHAMPHETAMINE QUANTITATIVE URINE: <100 NG/ML
OPIATES, URINE: NEGATIVE
OXYCODONE SCREEN URINE: NEGATIVE
PHENCYCLIDINE, URINE: NEGATIVE
PHENTERMINE, URINE, QUANTITATIVE: <100 NG/ML
TEST INFORMATION: ABNORMAL
TRAMADOL, URINE: NEGATIVE

## 2023-12-11 ENCOUNTER — TELEPHONE (OUTPATIENT)
Dept: PRIMARY CARE CLINIC | Age: 43
End: 2023-12-11

## 2023-12-11 RX ORDER — CLONIDINE HYDROCHLORIDE 0.1 MG/1
0.1 TABLET ORAL 2 TIMES DAILY
Qty: 60 TABLET | Refills: 3 | Status: SHIPPED | OUTPATIENT
Start: 2023-12-11

## 2023-12-11 RX ORDER — CARVEDILOL 6.25 MG/1
6.25 TABLET ORAL 2 TIMES DAILY
Qty: 60 TABLET | Refills: 3 | Status: SHIPPED | OUTPATIENT
Start: 2023-12-11

## 2023-12-11 NOTE — TELEPHONE ENCOUNTER
Patient calling her blood pressure has been elevated since Friday. Lightheaded today but over doesn't feel well. Advised ED. Does not want to go to ED and sit for hours wanting to know your recommendations. She is on amlodipine 10mg qd. She did take clonidine 0.1mg this afternoon.    8am 179/108  1pm 176/116  4p 167/112

## 2023-12-11 NOTE — TELEPHONE ENCOUNTER
I would recommend emergency room however I did send over 2 new medications to take with her amlodipine. If she chooses not to go to the emergency room however continue to monitor her blood pressure with the new meds over the next day. If no improvement or if she develops chest pain, shortness of breath, visual changes, or headache she is to directly go to the emergency department.

## 2024-02-21 ENCOUNTER — TELEPHONE (OUTPATIENT)
Dept: PRIMARY CARE CLINIC | Age: 44
End: 2024-02-21

## 2024-02-21 NOTE — TELEPHONE ENCOUNTER
Patient requesting order for repeat thyroid US. Last US of thyroid was 11/2/23 and it said recommended repeat in 3-6 months.

## 2024-02-23 DIAGNOSIS — E04.1 THYROID NODULE: Primary | ICD-10-CM

## 2024-03-06 ENCOUNTER — OFFICE VISIT (OUTPATIENT)
Dept: FAMILY MEDICINE CLINIC | Age: 44
End: 2024-03-06
Payer: MEDICAID

## 2024-03-06 VITALS
HEART RATE: 88 BPM | SYSTOLIC BLOOD PRESSURE: 138 MMHG | TEMPERATURE: 98.1 F | WEIGHT: 210 LBS | BODY MASS INDEX: 38.64 KG/M2 | HEIGHT: 62 IN | DIASTOLIC BLOOD PRESSURE: 88 MMHG | OXYGEN SATURATION: 98 %

## 2024-03-06 DIAGNOSIS — J02.9 PHARYNGITIS, UNSPECIFIED ETIOLOGY: Primary | ICD-10-CM

## 2024-03-06 DIAGNOSIS — L30.9 DERMATITIS, UNSPECIFIED: ICD-10-CM

## 2024-03-06 PROCEDURE — 99213 OFFICE O/P EST LOW 20 MIN: CPT | Performed by: FAMILY MEDICINE

## 2024-03-06 PROCEDURE — G8417 CALC BMI ABV UP PARAM F/U: HCPCS | Performed by: FAMILY MEDICINE

## 2024-03-06 PROCEDURE — 1036F TOBACCO NON-USER: CPT | Performed by: FAMILY MEDICINE

## 2024-03-06 PROCEDURE — 3075F SYST BP GE 130 - 139MM HG: CPT | Performed by: FAMILY MEDICINE

## 2024-03-06 PROCEDURE — 3079F DIAST BP 80-89 MM HG: CPT | Performed by: FAMILY MEDICINE

## 2024-03-06 PROCEDURE — G8427 DOCREV CUR MEDS BY ELIG CLIN: HCPCS | Performed by: FAMILY MEDICINE

## 2024-03-06 PROCEDURE — G8484 FLU IMMUNIZE NO ADMIN: HCPCS | Performed by: FAMILY MEDICINE

## 2024-03-06 RX ORDER — AZITHROMYCIN 250 MG/1
TABLET, FILM COATED ORAL
Qty: 6 TABLET | Refills: 0 | Status: SHIPPED | OUTPATIENT
Start: 2024-03-06 | End: 2024-03-16

## 2024-03-06 RX ORDER — PREDNISONE 10 MG/1
TABLET ORAL
Qty: 30 TABLET | Refills: 0 | Status: SHIPPED | OUTPATIENT
Start: 2024-03-06

## 2024-03-06 RX ORDER — KETOCONAZOLE 20 MG/G
CREAM TOPICAL
Qty: 30 G | Refills: 1 | Status: SHIPPED | OUTPATIENT
Start: 2024-03-06

## 2024-03-06 RX ORDER — CHLORHEXIDINE GLUCONATE 40 MG/ML
SOLUTION TOPICAL
Qty: 473 ML | Refills: 0 | Status: SHIPPED | OUTPATIENT
Start: 2024-03-06

## 2024-03-06 RX ORDER — DIAZEPAM 5 MG/1
5 TABLET ORAL 2 TIMES DAILY
COMMUNITY
Start: 2024-03-04

## 2024-03-06 ASSESSMENT — ENCOUNTER SYMPTOMS
GASTROINTESTINAL NEGATIVE: 1
TROUBLE SWALLOWING: 0
CHOKING: 1
EYES NEGATIVE: 1
SORE THROAT: 1

## 2024-03-06 NOTE — PROGRESS NOTES
Alejandra Franklin (:  1980) is a 43 y.o. female,Established patient, here for evaluation of the following chief complaint(s):  Pharyngitis (Would like to see provider.), Congestion, and Headache         ASSESSMENT/PLAN:  1. Pharyngitis, unspecified etiology  -     azithromycin (ZITHROMAX) 250 MG tablet; 500mg on day 1 followed by 250mg on days 2 - 5, Disp-6 tablet, R-0Normal  -     predniSONE (DELTASONE) 10 MG tablet; Take 4 tabs x 3 days, 3 tabs x 3 days, 2 tabs x 3 days, 1 tab x 3 days, stop., Disp-30 tablet, R-0Normal  2. Dermatitis, unspecified  -     chlorhexidine gluconate (ANTISEPTIC SKIN CLEANSER) 4 % SOLN external solution; Apply to the back daily and allowed to dry.  Rinse off then apply ketoconazole, Disp-473 mL, R-0, Normal  -     ketoconazole (NIZORAL) 2 % cream; Apply topically daily., Disp-30 g, R-1, Normal  At this time we will treat symptomatically.  Patient continues to have posterior back dermatitis.  Will try to treat for both and see her back as scheduled.  Red flags discussed if these occur she is to go directly to the nearest emergency department.    No follow-ups on file.         Subjective   SUBJECTIVE/OBJECTIVE:  Pharyngitis  Associated symptoms include congestion, headaches, a rash and a sore throat. Pertinent negatives include no fever or neck pain.   Headache    Patient presents today for several day history of worsening headache, congestion, and sore throat.  No fever or chills.  No chest pain or shortness of breath.  No nausea vomiting or diarrhea.  Continues with pustular lesions to the back as well.  Very pruritic.  No known sick contact or recent travel.  Review of Systems   Constitutional:  Negative for fever.   HENT:  Positive for congestion, postnasal drip and sore throat. Negative for trouble swallowing.    Eyes: Negative.    Respiratory:  Positive for choking.    Cardiovascular: Negative.    Gastrointestinal: Negative.    Musculoskeletal:  Negative for neck pain.   Skin:

## 2024-03-25 ENCOUNTER — OFFICE VISIT (OUTPATIENT)
Dept: PRIMARY CARE CLINIC | Age: 44
End: 2024-03-25
Payer: MEDICAID

## 2024-03-25 VITALS
WEIGHT: 215 LBS | OXYGEN SATURATION: 98 % | HEIGHT: 62 IN | BODY MASS INDEX: 39.56 KG/M2 | DIASTOLIC BLOOD PRESSURE: 78 MMHG | SYSTOLIC BLOOD PRESSURE: 140 MMHG | HEART RATE: 111 BPM | TEMPERATURE: 98 F

## 2024-03-25 DIAGNOSIS — L73.9 FOLLICULITIS: ICD-10-CM

## 2024-03-25 DIAGNOSIS — E66.01 MORBID OBESITY (HCC): ICD-10-CM

## 2024-03-25 DIAGNOSIS — E04.1 THYROID NODULE: Primary | ICD-10-CM

## 2024-03-25 DIAGNOSIS — E04.1 THYROID NODULE: ICD-10-CM

## 2024-03-25 DIAGNOSIS — I10 PRIMARY HYPERTENSION: ICD-10-CM

## 2024-03-25 DIAGNOSIS — R63.5 ABNORMAL WEIGHT GAIN: ICD-10-CM

## 2024-03-25 PROBLEM — E27.8 ADRENAL NODULE (HCC): Status: RESOLVED | Noted: 2021-12-27 | Resolved: 2024-03-25

## 2024-03-25 PROBLEM — E27.9 ADRENAL NODULE (HCC): Status: RESOLVED | Noted: 2021-12-27 | Resolved: 2024-03-25

## 2024-03-25 LAB
ALBUMIN SERPL-MCNC: 4.6 G/DL (ref 3.5–5.2)
ALP BLD-CCNC: 77 U/L (ref 35–104)
ALT SERPL-CCNC: 41 U/L (ref 0–32)
ANION GAP SERPL CALCULATED.3IONS-SCNC: 21 MMOL/L (ref 7–16)
AST SERPL-CCNC: 21 U/L (ref 0–31)
BASOPHILS ABSOLUTE: 0.05 K/UL (ref 0–0.2)
BASOPHILS RELATIVE PERCENT: 1 % (ref 0–2)
BILIRUB SERPL-MCNC: 0.5 MG/DL (ref 0–1.2)
BILIRUBIN DIRECT: <0.2 MG/DL (ref 0–0.3)
BILIRUBIN, INDIRECT: ABNORMAL MG/DL (ref 0–1)
BUN BLDV-MCNC: 9 MG/DL (ref 6–20)
CALCIUM SERPL-MCNC: 10 MG/DL (ref 8.6–10.2)
CHLORIDE BLD-SCNC: 101 MMOL/L (ref 98–107)
CO2: 20 MMOL/L (ref 22–29)
CREAT SERPL-MCNC: 0.7 MG/DL (ref 0.5–1)
D DIMER: 274 NG/ML DDU (ref 0–232)
EOSINOPHILS ABSOLUTE: 0.07 K/UL (ref 0.05–0.5)
EOSINOPHILS RELATIVE PERCENT: 1 % (ref 0–6)
GFR SERPL CREATININE-BSD FRML MDRD: >90 ML/MIN/1.73M2
GLUCOSE BLD-MCNC: 98 MG/DL (ref 74–99)
HBA1C MFR BLD: 5.4 % (ref 4–5.6)
HCT VFR BLD CALC: 49.6 % (ref 34–48)
HEMOGLOBIN: 16.7 G/DL (ref 11.5–15.5)
HIGH SENSITIVE C-REACTIVE PROTEIN: 3.7 MG/L (ref 0–3)
IMMATURE GRANULOCYTES %: 0 % (ref 0–5)
IMMATURE GRANULOCYTES ABSOLUTE: 0.03 K/UL (ref 0–0.58)
LIPASE: 16 U/L (ref 13–60)
LYMPHOCYTES ABSOLUTE: 2.39 K/UL (ref 1.5–4)
LYMPHOCYTES RELATIVE PERCENT: 30 % (ref 20–42)
MCH RBC QN AUTO: 30.9 PG (ref 26–35)
MCHC RBC AUTO-ENTMCNC: 33.7 G/DL (ref 32–34.5)
MCV RBC AUTO: 91.7 FL (ref 80–99.9)
MONOCYTES ABSOLUTE: 0.47 K/UL (ref 0.1–0.95)
MONOCYTES RELATIVE PERCENT: 6 % (ref 2–12)
NEUTROPHILS ABSOLUTE: 4.95 K/UL (ref 1.8–7.3)
NEUTROPHILS RELATIVE PERCENT: 62 % (ref 43–80)
PDW BLD-RTO: 12.7 % (ref 11.5–15)
PLATELET # BLD: 304 K/UL (ref 130–450)
PMV BLD AUTO: 11.8 FL (ref 7–12)
POTASSIUM SERPL-SCNC: 4.3 MMOL/L (ref 3.5–5)
PRO-BNP: 86 PG/ML (ref 0–125)
RBC # BLD: 5.41 M/UL (ref 3.5–5.5)
SODIUM BLD-SCNC: 142 MMOL/L (ref 132–146)
T4 FREE: 1 NG/DL (ref 0.9–1.7)
TOTAL CK: 57 U/L (ref 20–180)
TOTAL PROTEIN: 7.9 G/DL (ref 6.4–8.3)
TSH SERPL DL<=0.05 MIU/L-ACNC: 1.59 UIU/ML (ref 0.27–4.2)
WBC # BLD: 8 K/UL (ref 4.5–11.5)

## 2024-03-25 PROCEDURE — G8427 DOCREV CUR MEDS BY ELIG CLIN: HCPCS | Performed by: FAMILY MEDICINE

## 2024-03-25 PROCEDURE — 1036F TOBACCO NON-USER: CPT | Performed by: FAMILY MEDICINE

## 2024-03-25 PROCEDURE — G8417 CALC BMI ABV UP PARAM F/U: HCPCS | Performed by: FAMILY MEDICINE

## 2024-03-25 PROCEDURE — 99214 OFFICE O/P EST MOD 30 MIN: CPT | Performed by: FAMILY MEDICINE

## 2024-03-25 PROCEDURE — 3077F SYST BP >= 140 MM HG: CPT | Performed by: FAMILY MEDICINE

## 2024-03-25 PROCEDURE — 3078F DIAST BP <80 MM HG: CPT | Performed by: FAMILY MEDICINE

## 2024-03-25 PROCEDURE — G8484 FLU IMMUNIZE NO ADMIN: HCPCS | Performed by: FAMILY MEDICINE

## 2024-03-25 RX ORDER — CLINDAMYCIN AND BENZOYL PEROXIDE 10; 50 MG/G; MG/G
GEL TOPICAL
Qty: 50 G | Refills: 5 | Status: SHIPPED | OUTPATIENT
Start: 2024-03-25

## 2024-03-25 RX ORDER — LISDEXAMFETAMINE DIMESYLATE 70 MG/1
70 CAPSULE ORAL DAILY
COMMUNITY
Start: 2024-03-06

## 2024-03-25 SDOH — ECONOMIC STABILITY: FOOD INSECURITY: WITHIN THE PAST 12 MONTHS, THE FOOD YOU BOUGHT JUST DIDN'T LAST AND YOU DIDN'T HAVE MONEY TO GET MORE.: NEVER TRUE

## 2024-03-25 SDOH — ECONOMIC STABILITY: INCOME INSECURITY: HOW HARD IS IT FOR YOU TO PAY FOR THE VERY BASICS LIKE FOOD, HOUSING, MEDICAL CARE, AND HEATING?: NOT HARD AT ALL

## 2024-03-25 SDOH — ECONOMIC STABILITY: FOOD INSECURITY: WITHIN THE PAST 12 MONTHS, YOU WORRIED THAT YOUR FOOD WOULD RUN OUT BEFORE YOU GOT MONEY TO BUY MORE.: NEVER TRUE

## 2024-03-25 ASSESSMENT — PATIENT HEALTH QUESTIONNAIRE - PHQ9
1. LITTLE INTEREST OR PLEASURE IN DOING THINGS: NOT AT ALL
SUM OF ALL RESPONSES TO PHQ9 QUESTIONS 1 & 2: 0
SUM OF ALL RESPONSES TO PHQ QUESTIONS 1-9: 0
2. FEELING DOWN, DEPRESSED OR HOPELESS: NOT AT ALL
SUM OF ALL RESPONSES TO PHQ QUESTIONS 1-9: 0

## 2024-03-25 ASSESSMENT — ENCOUNTER SYMPTOMS
VOMITING: 0
SHORTNESS OF BREATH: 0
COUGH: 0
SORE THROAT: 0
ABDOMINAL PAIN: 0
BACK PAIN: 0
BLOOD IN STOOL: 0
CONSTIPATION: 0
DIARRHEA: 0
PHOTOPHOBIA: 0
NAUSEA: 0

## 2024-03-25 NOTE — PROGRESS NOTES
Alejandra Franklin (:  1980) is a 43 y.o. female,Established patient, here for evaluation of the following chief complaint(s):  3 Month Follow-Up         ASSESSMENT/PLAN:  1. Thyroid nodule  -     CBC with Auto Differential; Future  -     Comprehensive Metabolic Panel with Bilirubin; Future  -     TSH; Future  -     T4, Free; Future  -     Hemoglobin A1C; Future  -     High sensitivity CRP; Future  -     Follicle Stimulating Hormone; Future  -     Luteinizing Hormone; Future  -     D-Dimer, Quantitative; Future  -     CK; Future  -     Lipase; Future  -     Brain Natriuretic Peptide; Future  2. Primary hypertension  -     CBC with Auto Differential; Future  -     Comprehensive Metabolic Panel with Bilirubin; Future  -     TSH; Future  -     T4, Free; Future  -     Hemoglobin A1C; Future  -     High sensitivity CRP; Future  -     Follicle Stimulating Hormone; Future  -     Luteinizing Hormone; Future  -     D-Dimer, Quantitative; Future  -     CK; Future  -     Lipase; Future  -     Brain Natriuretic Peptide; Future  3. Morbid obesity (HCC)  -     CBC with Auto Differential; Future  -     Comprehensive Metabolic Panel with Bilirubin; Future  -     TSH; Future  -     T4, Free; Future  -     Hemoglobin A1C; Future  -     High sensitivity CRP; Future  -     Follicle Stimulating Hormone; Future  -     Luteinizing Hormone; Future  -     D-Dimer, Quantitative; Future  -     CK; Future  -     Lipase; Future  -     Brain Natriuretic Peptide; Future  4. Abnormal weight gain  -     CBC with Auto Differential; Future  -     Comprehensive Metabolic Panel with Bilirubin; Future  -     TSH; Future  -     T4, Free; Future  -     Hemoglobin A1C; Future  -     High sensitivity CRP; Future  -     Follicle Stimulating Hormone; Future  -     Luteinizing Hormone; Future  -     D-Dimer, Quantitative; Future  -     CK; Future  -     Lipase; Future  -     Brain Natriuretic Peptide; Future  5. Folliculitis  -     clindamycin-benzoyl 
°C)   Ht 1.575 m (5' 2.01\")   Wt 97.5 kg (215 lb)   LMP 08/31/2017 (Exact Date)   SpO2 98%   BMI 39.31 kg/m²     Objective   Physical Exam  Vitals reviewed.   Constitutional:       Appearance: She is well-developed. She is obese. She is ill-appearing.   HENT:      Head: Normocephalic and atraumatic.      Right Ear: Hearing and tympanic membrane normal.      Left Ear: Hearing and tympanic membrane normal.      Nose: Nose normal.      Mouth/Throat:      Dentition: Normal dentition.      Pharynx: Posterior oropharyngeal erythema present. No oropharyngeal exudate.      Tonsils: No tonsillar exudate.   Eyes:      Conjunctiva/sclera: Conjunctivae normal.      Pupils: Pupils are equal, round, and reactive to light.   Cardiovascular:      Rate and Rhythm: Regular rhythm. Tachycardia present.      Heart sounds: Normal heart sounds. No murmur heard.  Pulmonary:      Effort: Pulmonary effort is normal. No respiratory distress.      Breath sounds: Normal breath sounds. No wheezing.   Abdominal:      General: Bowel sounds are normal. There is no distension.      Palpations: Abdomen is soft.   Musculoskeletal:      Cervical back: Normal range of motion and neck supple.   Lymphadenopathy:      Cervical: No cervical adenopathy.   Skin:     General: Skin is warm and dry.      Findings: Erythema and rash present.      Comments: Erythematous papular rash to the posterior thorax   Neurological:      Mental Status: She is alert.   Psychiatric:         Behavior: Behavior normal.                  An electronic signature was used to authenticate this note.    --Silas Conklin, DO

## 2024-03-26 LAB
FOLLICLE STIMULATING HORMONE: 4.7 MIU/ML
LH: 4.3 MIU/ML

## 2024-03-27 ENCOUNTER — TELEPHONE (OUTPATIENT)
Dept: PRIMARY CARE CLINIC | Age: 44
End: 2024-03-27

## 2024-03-29 DIAGNOSIS — E66.01 MORBID OBESITY (HCC): ICD-10-CM

## 2024-03-29 DIAGNOSIS — Z87.898 HISTORY OF PREDIABETES: ICD-10-CM

## 2024-03-29 DIAGNOSIS — R63.5 ABNORMAL WEIGHT GAIN: Primary | ICD-10-CM

## 2024-05-07 ENCOUNTER — OFFICE VISIT (OUTPATIENT)
Dept: FAMILY MEDICINE CLINIC | Age: 44
End: 2024-05-07
Payer: MEDICAID

## 2024-05-07 VITALS
WEIGHT: 212 LBS | HEART RATE: 82 BPM | SYSTOLIC BLOOD PRESSURE: 120 MMHG | TEMPERATURE: 98.6 F | HEIGHT: 62 IN | RESPIRATION RATE: 14 BRPM | OXYGEN SATURATION: 98 % | DIASTOLIC BLOOD PRESSURE: 78 MMHG | BODY MASS INDEX: 39.01 KG/M2

## 2024-05-07 DIAGNOSIS — J06.9 URI WITH COUGH AND CONGESTION: Primary | ICD-10-CM

## 2024-05-07 PROCEDURE — 3078F DIAST BP <80 MM HG: CPT

## 2024-05-07 PROCEDURE — 99213 OFFICE O/P EST LOW 20 MIN: CPT

## 2024-05-07 PROCEDURE — G8417 CALC BMI ABV UP PARAM F/U: HCPCS

## 2024-05-07 PROCEDURE — 1036F TOBACCO NON-USER: CPT

## 2024-05-07 PROCEDURE — 3074F SYST BP LT 130 MM HG: CPT

## 2024-05-07 PROCEDURE — G8427 DOCREV CUR MEDS BY ELIG CLIN: HCPCS

## 2024-05-07 RX ORDER — GUAIFENESIN 600 MG/1
600 TABLET, EXTENDED RELEASE ORAL 2 TIMES DAILY
Qty: 30 TABLET | Refills: 0 | Status: SHIPPED | OUTPATIENT
Start: 2024-05-07 | End: 2024-05-22

## 2024-05-07 RX ORDER — PREDNISONE 20 MG/1
TABLET ORAL
Qty: 18 TABLET | Refills: 0 | Status: SHIPPED | OUTPATIENT
Start: 2024-05-07

## 2024-05-07 RX ORDER — DEXTROMETHORPHAN HYDROBROMIDE AND PROMETHAZINE HYDROCHLORIDE 15; 6.25 MG/5ML; MG/5ML
5 SYRUP ORAL 4 TIMES DAILY PRN
Qty: 180 ML | Refills: 0 | Status: SHIPPED | OUTPATIENT
Start: 2024-05-07 | End: 2024-05-16

## 2024-05-07 RX ORDER — BENZONATATE 100 MG/1
100 CAPSULE ORAL 3 TIMES DAILY PRN
Qty: 30 CAPSULE | Refills: 0 | Status: SHIPPED | OUTPATIENT
Start: 2024-05-07 | End: 2024-05-17
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
## 2024-06-25 ENCOUNTER — OFFICE VISIT (OUTPATIENT)
Dept: PRIMARY CARE CLINIC | Age: 44
End: 2024-06-25
Payer: MEDICAID

## 2024-06-25 VITALS
BODY MASS INDEX: 39.01 KG/M2 | TEMPERATURE: 98.1 F | HEART RATE: 82 BPM | DIASTOLIC BLOOD PRESSURE: 88 MMHG | OXYGEN SATURATION: 98 % | HEIGHT: 62 IN | WEIGHT: 212 LBS | SYSTOLIC BLOOD PRESSURE: 142 MMHG

## 2024-06-25 DIAGNOSIS — R63.5 ABNORMAL WEIGHT GAIN: ICD-10-CM

## 2024-06-25 DIAGNOSIS — E66.01 MORBID OBESITY (HCC): Primary | ICD-10-CM

## 2024-06-25 DIAGNOSIS — L72.3 SEBACEOUS CYST: ICD-10-CM

## 2024-06-25 DIAGNOSIS — I10 PRIMARY HYPERTENSION: ICD-10-CM

## 2024-06-25 PROCEDURE — 3079F DIAST BP 80-89 MM HG: CPT | Performed by: FAMILY MEDICINE

## 2024-06-25 PROCEDURE — G8417 CALC BMI ABV UP PARAM F/U: HCPCS | Performed by: FAMILY MEDICINE

## 2024-06-25 PROCEDURE — G8427 DOCREV CUR MEDS BY ELIG CLIN: HCPCS | Performed by: FAMILY MEDICINE

## 2024-06-25 PROCEDURE — 3077F SYST BP >= 140 MM HG: CPT | Performed by: FAMILY MEDICINE

## 2024-06-25 PROCEDURE — 1036F TOBACCO NON-USER: CPT | Performed by: FAMILY MEDICINE

## 2024-06-25 PROCEDURE — 99213 OFFICE O/P EST LOW 20 MIN: CPT | Performed by: FAMILY MEDICINE

## 2024-06-25 RX ORDER — CARVEDILOL 6.25 MG/1
6.25 TABLET ORAL 2 TIMES DAILY
Qty: 60 TABLET | Refills: 5 | Status: SHIPPED | OUTPATIENT
Start: 2024-06-25

## 2024-06-25 RX ORDER — AMLODIPINE BESYLATE 10 MG/1
10 TABLET ORAL DAILY
Qty: 30 TABLET | Refills: 5 | Status: SHIPPED | OUTPATIENT
Start: 2024-06-25

## 2024-06-25 RX ORDER — CLONIDINE HYDROCHLORIDE 0.1 MG/1
0.1 TABLET ORAL 2 TIMES DAILY
Qty: 60 TABLET | Refills: 5 | Status: SHIPPED | OUTPATIENT
Start: 2024-06-25

## 2024-06-25 ASSESSMENT — ENCOUNTER SYMPTOMS
NAUSEA: 0
BLOOD IN STOOL: 0
SHORTNESS OF BREATH: 0
DIARRHEA: 0
ABDOMINAL PAIN: 0
COUGH: 0
BACK PAIN: 0
CONSTIPATION: 0
SORE THROAT: 0
VOMITING: 0
PHOTOPHOBIA: 0

## 2024-06-25 NOTE — PROGRESS NOTES
Alejandra Franklin (:  1980) is a 43 y.o. female,Established patient, here for evaluation of the following chief complaint(s):  3 Month Follow-Up      Assessment & Plan   ASSESSMENT/PLAN:  1. Morbid obesity (HCC)  -     Liraglutide (VICTOZA) 18 MG/3ML SOPN SC injection; Inject 1.2 mg into the skin daily, Disp-2 Adjustable Dose Pre-filled Pen Syringe, R-3Normal  2. Abnormal weight gain  -     Liraglutide (VICTOZA) 18 MG/3ML SOPN SC injection; Inject 1.2 mg into the skin daily, Disp-2 Adjustable Dose Pre-filled Pen Syringe, R-3Normal  3. Primary hypertension  -     amLODIPine (NORVASC) 10 MG tablet; Take 1 tablet by mouth daily, Disp-30 tablet, R-5Normal  -     carvedilol (COREG) 6.25 MG tablet; Take 1 tablet by mouth 2 times daily, Disp-60 tablet, R-5Normal  -     cloNIDine (CATAPRES) 0.1 MG tablet; Take 1 tablet by mouth 2 times daily, Disp-60 tablet, R-5Normal  4. Sebaceous cyst  -     Garry Grant MD, General Surgery, [place]  Patient continues to have issues with weight gain.  Will try to get Victoza ordered and approved as I do believe she will strongly benefit from this in addition to helping with her blood pressure issues.  See her back in 3 months or sooner based on availability of medication.  Referral to surgery for sebaceous cyst removal.  Return sooner for any acute issues.    Return in about 3 months (around 2024).         Subjective   SUBJECTIVE/OBJECTIVE:  HPI  Patient presents today for 3-month follow-up on chronic issues.  Patient states she has been taking all medication as prescribed and states she is feeling somewhat better.  Continues to have posterior back rash which has not really improved over previous office visit.  Also patient has had worsening fatigue and abnormal weight gain over the last several months.  Has been trying to adjust her diet and increase her activity level in addition to increasing her water intake.  Has actually gained weight unfortunately over the

## 2024-06-27 ENCOUNTER — OFFICE VISIT (OUTPATIENT)
Dept: SURGERY | Age: 44
End: 2024-06-27
Payer: MEDICAID

## 2024-06-27 VITALS
DIASTOLIC BLOOD PRESSURE: 99 MMHG | BODY MASS INDEX: 38.83 KG/M2 | HEIGHT: 62 IN | HEART RATE: 103 BPM | OXYGEN SATURATION: 99 % | SYSTOLIC BLOOD PRESSURE: 160 MMHG | WEIGHT: 211 LBS | TEMPERATURE: 98.1 F

## 2024-06-27 DIAGNOSIS — L72.0 EPIDERMOID CYST OF SKIN OF BACK: Primary | ICD-10-CM

## 2024-06-27 PROCEDURE — G8417 CALC BMI ABV UP PARAM F/U: HCPCS | Performed by: SURGERY

## 2024-06-27 PROCEDURE — 3080F DIAST BP >= 90 MM HG: CPT | Performed by: SURGERY

## 2024-06-27 PROCEDURE — 3075F SYST BP GE 130 - 139MM HG: CPT | Performed by: SURGERY

## 2024-06-27 PROCEDURE — 99203 OFFICE O/P NEW LOW 30 MIN: CPT | Performed by: SURGERY

## 2024-06-27 PROCEDURE — G8427 DOCREV CUR MEDS BY ELIG CLIN: HCPCS | Performed by: SURGERY

## 2024-06-27 PROCEDURE — 1036F TOBACCO NON-USER: CPT | Performed by: SURGERY
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
## 2024-07-24 ENCOUNTER — PROCEDURE VISIT (OUTPATIENT)
Dept: SURGERY | Age: 44
End: 2024-07-24

## 2024-07-24 VITALS
BODY MASS INDEX: 38.89 KG/M2 | OXYGEN SATURATION: 98 % | DIASTOLIC BLOOD PRESSURE: 104 MMHG | SYSTOLIC BLOOD PRESSURE: 126 MMHG | WEIGHT: 212.6 LBS | TEMPERATURE: 98.1 F | HEART RATE: 106 BPM

## 2024-07-24 DIAGNOSIS — D23.5 DERMOID CYST OF SKIN OF BACK: Primary | ICD-10-CM

## 2024-07-24 RX ORDER — LIDOCAINE HYDROCHLORIDE AND EPINEPHRINE 10; 10 MG/ML; UG/ML
20 INJECTION, SOLUTION INFILTRATION; PERINEURAL ONCE
Status: COMPLETED | OUTPATIENT
Start: 2024-07-24 | End: 2024-07-24

## 2024-07-24 RX ADMIN — LIDOCAINE HYDROCHLORIDE AND EPINEPHRINE 20 ML: 10; 10 INJECTION, SOLUTION INFILTRATION; PERINEURAL at 16:47

## 2024-07-24 RX ADMIN — LIDOCAINE HYDROCHLORIDE AND EPINEPHRINE 20 ML: 10; 10 INJECTION, SOLUTION INFILTRATION; PERINEURAL at 16:44

## 2024-07-24 NOTE — PROGRESS NOTES
Office Procedure:    Diagnosis: Cyst x 2    Location: back    Surgeon: Garry Lyon MD    Specimen: Cyst, 1.3 and 1.6 cm    Procedure:  After informed consent, the area was prepped in sterile fashion.  1% lidocaine with epinephrine was infiltrated circumferentially around the lesion.  Elliptical incision made and carried down through subcutaneous tissue where the lesion was dissected out. Once under the lesion, it was amputated and sent for specimen.  The wound was irrigated and wound closed with 4-0 Monocryl. Skin glue applied.  Separate incision made and similar dissection for the more superior cyst. Procedure was tolerated well.      Garry Lyon MD  07/24/24    CC: Silas Conklin DO

## 2024-08-22 LAB — SURGICAL PATHOLOGY REPORT: NORMAL

## 2024-09-25 ENCOUNTER — OFFICE VISIT (OUTPATIENT)
Dept: PRIMARY CARE CLINIC | Age: 44
End: 2024-09-25
Payer: MEDICAID

## 2024-09-25 VITALS
WEIGHT: 206 LBS | DIASTOLIC BLOOD PRESSURE: 86 MMHG | HEIGHT: 62 IN | HEART RATE: 104 BPM | TEMPERATURE: 97.6 F | BODY MASS INDEX: 37.91 KG/M2 | SYSTOLIC BLOOD PRESSURE: 138 MMHG | OXYGEN SATURATION: 98 %

## 2024-09-25 DIAGNOSIS — Z12.31 BREAST CANCER SCREENING BY MAMMOGRAM: Primary | ICD-10-CM

## 2024-09-25 DIAGNOSIS — R63.5 ABNORMAL WEIGHT GAIN: ICD-10-CM

## 2024-09-25 DIAGNOSIS — E66.01 MORBID OBESITY: ICD-10-CM

## 2024-09-25 DIAGNOSIS — F43.12 CHRONIC POST-TRAUMATIC STRESS DISORDER (PTSD): ICD-10-CM

## 2024-09-25 DIAGNOSIS — I10 PRIMARY HYPERTENSION: ICD-10-CM

## 2024-09-25 PROBLEM — S01.81XA FACIAL LACERATION: Status: RESOLVED | Noted: 2023-10-21 | Resolved: 2024-09-25

## 2024-09-25 PROBLEM — S00.83XA FACIAL HEMATOMA: Status: RESOLVED | Noted: 2023-10-21 | Resolved: 2024-09-25

## 2024-09-25 PROBLEM — S09.90XA HEAD INJURY: Status: RESOLVED | Noted: 2023-10-21 | Resolved: 2024-09-25

## 2024-09-25 PROBLEM — T14.90XA TRAUMA: Status: RESOLVED | Noted: 2023-11-02 | Resolved: 2024-09-25

## 2024-09-25 PROCEDURE — 99213 OFFICE O/P EST LOW 20 MIN: CPT | Performed by: FAMILY MEDICINE

## 2024-09-25 PROCEDURE — 3079F DIAST BP 80-89 MM HG: CPT | Performed by: FAMILY MEDICINE

## 2024-09-25 PROCEDURE — G8427 DOCREV CUR MEDS BY ELIG CLIN: HCPCS | Performed by: FAMILY MEDICINE

## 2024-09-25 PROCEDURE — 1036F TOBACCO NON-USER: CPT | Performed by: FAMILY MEDICINE

## 2024-09-25 PROCEDURE — 3075F SYST BP GE 130 - 139MM HG: CPT | Performed by: FAMILY MEDICINE

## 2024-09-25 PROCEDURE — G8417 CALC BMI ABV UP PARAM F/U: HCPCS | Performed by: FAMILY MEDICINE

## 2024-09-25 RX ORDER — LIRAGLUTIDE 6 MG/ML
1.2 INJECTION SUBCUTANEOUS DAILY
Qty: 2 ADJUSTABLE DOSE PRE-FILLED PEN SYRINGE | Refills: 5 | Status: SHIPPED | OUTPATIENT
Start: 2024-09-25

## 2024-09-25 RX ORDER — LIRAGLUTIDE 6 MG/ML
1.2 INJECTION SUBCUTANEOUS DAILY
Qty: 2 ADJUSTABLE DOSE PRE-FILLED PEN SYRINGE | Refills: 3 | Status: SHIPPED
Start: 2024-09-25 | End: 2024-09-25

## 2024-09-25 ASSESSMENT — ENCOUNTER SYMPTOMS
CONSTIPATION: 0
PHOTOPHOBIA: 0
NAUSEA: 0
SORE THROAT: 0
DIARRHEA: 0
COUGH: 0
SHORTNESS OF BREATH: 0
BACK PAIN: 0
ABDOMINAL PAIN: 0
VOMITING: 0
BLOOD IN STOOL: 0

## 2024-11-24 ENCOUNTER — OFFICE VISIT (OUTPATIENT)
Dept: FAMILY MEDICINE CLINIC | Age: 44
End: 2024-11-24
Payer: MEDICAID

## 2024-11-24 VITALS
BODY MASS INDEX: 36.14 KG/M2 | TEMPERATURE: 97.5 F | DIASTOLIC BLOOD PRESSURE: 98 MMHG | HEIGHT: 63 IN | SYSTOLIC BLOOD PRESSURE: 130 MMHG | HEART RATE: 90 BPM | OXYGEN SATURATION: 98 % | WEIGHT: 204 LBS | RESPIRATION RATE: 16 BRPM

## 2024-11-24 DIAGNOSIS — J32.9 SINOBRONCHITIS: Primary | ICD-10-CM

## 2024-11-24 DIAGNOSIS — T36.95XA ANTIBIOTIC-INDUCED YEAST INFECTION: ICD-10-CM

## 2024-11-24 DIAGNOSIS — J02.9 SORE THROAT: ICD-10-CM

## 2024-11-24 DIAGNOSIS — J40 SINOBRONCHITIS: Primary | ICD-10-CM

## 2024-11-24 DIAGNOSIS — B37.9 ANTIBIOTIC-INDUCED YEAST INFECTION: ICD-10-CM

## 2024-11-24 LAB — S PYO AG THROAT QL: NORMAL

## 2024-11-24 PROCEDURE — G8417 CALC BMI ABV UP PARAM F/U: HCPCS | Performed by: NURSE PRACTITIONER

## 2024-11-24 PROCEDURE — 99213 OFFICE O/P EST LOW 20 MIN: CPT | Performed by: NURSE PRACTITIONER

## 2024-11-24 PROCEDURE — 3075F SYST BP GE 130 - 139MM HG: CPT | Performed by: NURSE PRACTITIONER

## 2024-11-24 PROCEDURE — G8484 FLU IMMUNIZE NO ADMIN: HCPCS | Performed by: NURSE PRACTITIONER

## 2024-11-24 PROCEDURE — 1036F TOBACCO NON-USER: CPT | Performed by: NURSE PRACTITIONER

## 2024-11-24 PROCEDURE — G8427 DOCREV CUR MEDS BY ELIG CLIN: HCPCS | Performed by: NURSE PRACTITIONER

## 2024-11-24 PROCEDURE — 87880 STREP A ASSAY W/OPTIC: CPT | Performed by: NURSE PRACTITIONER

## 2024-11-24 PROCEDURE — 3080F DIAST BP >= 90 MM HG: CPT | Performed by: NURSE PRACTITIONER

## 2024-11-24 RX ORDER — DOXYCYCLINE 100 MG/1
100 CAPSULE ORAL 2 TIMES DAILY
Qty: 20 CAPSULE | Refills: 0 | Status: SHIPPED | OUTPATIENT
Start: 2024-11-24 | End: 2024-12-04

## 2024-11-24 RX ORDER — FLUCONAZOLE 150 MG/1
TABLET ORAL
Qty: 2 TABLET | Refills: 0 | Status: SHIPPED | OUTPATIENT
Start: 2024-11-24

## 2024-12-04 ENCOUNTER — OFFICE VISIT (OUTPATIENT)
Dept: FAMILY MEDICINE CLINIC | Age: 44
End: 2024-12-04

## 2024-12-04 VITALS
TEMPERATURE: 97.8 F | HEIGHT: 63 IN | RESPIRATION RATE: 18 BRPM | SYSTOLIC BLOOD PRESSURE: 138 MMHG | OXYGEN SATURATION: 97 % | HEART RATE: 105 BPM | BODY MASS INDEX: 35.97 KG/M2 | WEIGHT: 203 LBS | DIASTOLIC BLOOD PRESSURE: 88 MMHG

## 2024-12-04 DIAGNOSIS — R52 GENERALIZED BODY ACHES: Primary | ICD-10-CM

## 2024-12-04 DIAGNOSIS — U07.1 COVID-19: ICD-10-CM

## 2024-12-04 LAB
INFLUENZA A ANTIBODY: NORMAL
INFLUENZA B ANTIBODY: NORMAL
Lab: ABNORMAL
PERFORMING INSTRUMENT: ABNORMAL
QC PASS/FAIL: ABNORMAL
S PYO AG THROAT QL: NORMAL
SARS-COV-2, POC: DETECTED

## 2024-12-04 ASSESSMENT — ENCOUNTER SYMPTOMS
DIARRHEA: 0
CHEST TIGHTNESS: 0
SINUS PAIN: 0
EYE REDNESS: 0
COUGH: 1
SINUS PRESSURE: 1
PHOTOPHOBIA: 0
BLOOD IN STOOL: 0
TROUBLE SWALLOWING: 0
EYE PAIN: 0
BACK PAIN: 0
EYE DISCHARGE: 0
ALLERGIC/IMMUNOLOGIC NEGATIVE: 1
ABDOMINAL PAIN: 0
VOMITING: 0
NAUSEA: 0
SHORTNESS OF BREATH: 0
SORE THROAT: 1

## 2024-12-04 NOTE — PROGRESS NOTES
24  Alejandra Franklin : 1980 Sex: female  Age: 44 y.o.      Assessment and Plan:  Alejandra was seen today for generalized body aches and cough.    Diagnoses and all orders for this visit:    Generalized body aches  -     POCT Influenza A/B  -     POCT COVID-19, Antigen  -     POCT rapid strep A    COVID-19  -     nirmatrelvir/ritonavir 300/100 (PAXLOVID, 300/100,) 20 x 150 MG & 10 x 100MG TBPK; Take 3 tablets (two 150 mg nirmatrelvir and one 100 mg ritonavir tablets) by mouth every 12 hours for 5 days.    Rapid antigen testing for influenza and strep are negative, COVID-positive.  She is diabetic, hypertensive, BMI of 36.  She has sufficient risk factors to warrant treatment with Paxlovid.  Symptomatic treatment can include Tylenol, fluids, rest, Mucinex, Claritin, coolmist.  If complaints do not improve, or worsen in any way, follow-up at the office.    Return 1 to 3-day recheck if not improving.    Chief Complaint   Patient presents with    Generalized Body Aches    Cough       Congestion, pressure, drainage, facial tenderness, mild headache, low-grade fever, myalgias, cough, onset 2 days ago.  Denies chills, diaphoresis, nausea, vomiting, decreased oral intake. Denies other GI or  complaints.   OTC treatments minimally effective.          Review of Systems   Constitutional:  Positive for fatigue and fever. Negative for appetite change and unexpected weight change.   HENT:  Positive for congestion, postnasal drip, sinus pressure and sore throat. Negative for ear pain, hearing loss, sinus pain and trouble swallowing.    Eyes:  Negative for photophobia, pain, discharge and redness.   Respiratory:  Positive for cough. Negative for chest tightness and shortness of breath.    Cardiovascular:  Negative for chest pain, palpitations and leg swelling.   Gastrointestinal:  Negative for abdominal pain, blood in stool, diarrhea, nausea and vomiting.   Endocrine: Negative.    Genitourinary:  Negative for dysuria,

## 2024-12-06 ENCOUNTER — OFFICE VISIT (OUTPATIENT)
Dept: FAMILY MEDICINE CLINIC | Age: 44
End: 2024-12-06
Payer: MEDICAID

## 2024-12-06 VITALS
HEART RATE: 95 BPM | BODY MASS INDEX: 35.79 KG/M2 | TEMPERATURE: 98 F | WEIGHT: 202 LBS | OXYGEN SATURATION: 98 % | HEIGHT: 63 IN

## 2024-12-06 DIAGNOSIS — U07.1 COVID: Primary | ICD-10-CM

## 2024-12-06 DIAGNOSIS — R10.84 GENERALIZED ABDOMINAL PAIN: ICD-10-CM

## 2024-12-06 DIAGNOSIS — U07.1 COVID: ICD-10-CM

## 2024-12-06 LAB
ALBUMIN: 4.3 G/DL (ref 3.5–5.2)
ALP BLD-CCNC: 67 U/L (ref 35–104)
ALT SERPL-CCNC: 66 U/L (ref 0–32)
ANION GAP SERPL CALCULATED.3IONS-SCNC: 13 MMOL/L (ref 7–16)
AST SERPL-CCNC: 29 U/L (ref 0–31)
BASOPHILS ABSOLUTE: 0.04 K/UL (ref 0–0.2)
BASOPHILS RELATIVE PERCENT: 1 % (ref 0–2)
BILIRUB SERPL-MCNC: 0.4 MG/DL (ref 0–1.2)
BILIRUBIN DIRECT: <0.2 MG/DL (ref 0–0.3)
BILIRUBIN, INDIRECT: ABNORMAL MG/DL (ref 0–1)
BUN BLDV-MCNC: 12 MG/DL (ref 6–20)
CALCIUM SERPL-MCNC: 9.8 MG/DL (ref 8.6–10.2)
CHLORIDE BLD-SCNC: 100 MMOL/L (ref 98–107)
CO2: 28 MMOL/L (ref 22–29)
CREAT SERPL-MCNC: 0.8 MG/DL (ref 0.5–1)
EOSINOPHILS ABSOLUTE: 0.07 K/UL (ref 0.05–0.5)
EOSINOPHILS RELATIVE PERCENT: 1 % (ref 0–6)
GFR, ESTIMATED: >90 ML/MIN/1.73M2
GLUCOSE BLD-MCNC: 83 MG/DL (ref 74–99)
HCT VFR BLD CALC: 50.1 % (ref 34–48)
HEMOGLOBIN: 16.5 G/DL (ref 11.5–15.5)
IMMATURE GRANULOCYTES %: 0 % (ref 0–5)
IMMATURE GRANULOCYTES ABSOLUTE: <0.03 K/UL (ref 0–0.58)
LIPASE: 29 U/L (ref 13–60)
LYMPHOCYTES ABSOLUTE: 2.41 K/UL (ref 1.5–4)
LYMPHOCYTES RELATIVE PERCENT: 44 % (ref 20–42)
MCH RBC QN AUTO: 29.6 PG (ref 26–35)
MCHC RBC AUTO-ENTMCNC: 32.9 G/DL (ref 32–34.5)
MCV RBC AUTO: 89.8 FL (ref 80–99.9)
MONOCYTES ABSOLUTE: 0.48 K/UL (ref 0.1–0.95)
MONOCYTES RELATIVE PERCENT: 9 % (ref 2–12)
NEUTROPHILS ABSOLUTE: 2.52 K/UL (ref 1.8–7.3)
NEUTROPHILS RELATIVE PERCENT: 46 % (ref 43–80)
PDW BLD-RTO: 13.1 % (ref 11.5–15)
PLATELET # BLD: 247 K/UL (ref 130–450)
PMV BLD AUTO: 12 FL (ref 7–12)
POTASSIUM SERPL-SCNC: 3.7 MMOL/L (ref 3.5–5)
RBC # BLD: 5.58 M/UL (ref 3.5–5.5)
SODIUM BLD-SCNC: 141 MMOL/L (ref 132–146)
TOTAL CK: 68 U/L (ref 20–180)
TOTAL PROTEIN: 7.9 G/DL (ref 6.4–8.3)
WBC # BLD: 5.5 K/UL (ref 4.5–11.5)

## 2024-12-06 PROCEDURE — G8484 FLU IMMUNIZE NO ADMIN: HCPCS | Performed by: FAMILY MEDICINE

## 2024-12-06 PROCEDURE — 1036F TOBACCO NON-USER: CPT | Performed by: FAMILY MEDICINE

## 2024-12-06 PROCEDURE — 99214 OFFICE O/P EST MOD 30 MIN: CPT | Performed by: FAMILY MEDICINE

## 2024-12-06 PROCEDURE — G8417 CALC BMI ABV UP PARAM F/U: HCPCS | Performed by: FAMILY MEDICINE

## 2024-12-06 PROCEDURE — G8427 DOCREV CUR MEDS BY ELIG CLIN: HCPCS | Performed by: FAMILY MEDICINE

## 2024-12-06 RX ORDER — CODEINE PHOSPHATE AND GUAIFENESIN 10; 100 MG/5ML; MG/5ML
5 SOLUTION ORAL 4 TIMES DAILY PRN
Qty: 140 ML | Refills: 0 | Status: SHIPPED | OUTPATIENT
Start: 2024-12-06 | End: 2024-12-13

## 2024-12-06 RX ORDER — AZITHROMYCIN 250 MG/1
TABLET, FILM COATED ORAL
Qty: 6 TABLET | Refills: 0 | Status: SHIPPED | OUTPATIENT
Start: 2024-12-06 | End: 2024-12-16

## 2024-12-06 RX ORDER — METHYLPREDNISOLONE 4 MG/1
TABLET ORAL
Qty: 1 KIT | Refills: 0 | Status: SHIPPED | OUTPATIENT
Start: 2024-12-06

## 2024-12-06 ASSESSMENT — ENCOUNTER SYMPTOMS
COUGH: 1
TROUBLE SWALLOWING: 0
EYES NEGATIVE: 1
COLOR CHANGE: 1
GASTROINTESTINAL NEGATIVE: 1
SORE THROAT: 1

## 2024-12-06 NOTE — PROGRESS NOTES
Year: No     Family History   Problem Relation Age of Onset    Cancer Mother         sclc    Arthritis Mother     High Blood Pressure Mother     Asthma Mother     Heart Disease Maternal Grandmother     Diabetes Paternal Grandmother     Heart Failure Paternal Grandmother       There are no preventive care reminders to display for this patient.  There are no preventive care reminders to display for this patient.   There are no preventive care reminders to display for this patient.   Health Maintenance Due   Topic    DTaP/Tdap/Td vaccine (1 - Tdap)      Health Maintenance   Topic Date Due    Varicella vaccine (1 of 2 - 13+ 2-dose series) Never done    HIV screen  Never done    Hepatitis C screen  Never done    Hepatitis B vaccine (1 of 3 - 19+ 3-dose series) Never done    Breast cancer screen  Never done    DTaP/Tdap/Td vaccine (1 - Tdap) 10/22/2023    Flu vaccine (1) Never done    COVID-19 Vaccine (2 - 2023-24 season) 09/01/2024    Depression Screen  03/25/2025    Lipids  11/02/2028    Hepatitis A vaccine  Aged Out    Hib vaccine  Aged Out    HPV vaccine  Aged Out    Polio vaccine  Aged Out    Meningococcal (ACWY) vaccine  Aged Out    Pneumococcal 0-64 years Vaccine  Aged Out    Diabetes screen  Discontinued    Cervical cancer screen  Discontinued      There are no preventive care reminders to display for this patient.   There are no preventive care reminders to display for this patient.     Pulse 95   Temp 98 °F (36.7 °C)   Ht 1.588 m (5' 2.52\")   Wt 91.6 kg (202 lb)   LMP 08/31/2017 (Exact Date)   SpO2 98%   BMI 36.33 kg/m²     Objective   Physical Exam  Vitals reviewed.   Constitutional:       Appearance: She is well-developed. She is ill-appearing.   HENT:      Head: Normocephalic and atraumatic.      Right Ear: External ear normal.      Left Ear: External ear normal.      Nose: Nose normal.      Mouth/Throat:      Pharynx: Posterior oropharyngeal erythema present.   Eyes:      Conjunctiva/sclera:

## 2025-03-27 ENCOUNTER — OFFICE VISIT (OUTPATIENT)
Dept: PRIMARY CARE CLINIC | Age: 45
End: 2025-03-27
Payer: MEDICAID

## 2025-03-27 VITALS
HEART RATE: 110 BPM | TEMPERATURE: 97 F | WEIGHT: 202 LBS | HEIGHT: 63 IN | DIASTOLIC BLOOD PRESSURE: 96 MMHG | BODY MASS INDEX: 35.79 KG/M2 | SYSTOLIC BLOOD PRESSURE: 160 MMHG | RESPIRATION RATE: 16 BRPM | OXYGEN SATURATION: 99 %

## 2025-03-27 DIAGNOSIS — I10 PRIMARY HYPERTENSION: ICD-10-CM

## 2025-03-27 DIAGNOSIS — E66.01 MORBID OBESITY: Primary | ICD-10-CM

## 2025-03-27 DIAGNOSIS — E27.9 ADRENAL NODULE: ICD-10-CM

## 2025-03-27 DIAGNOSIS — R63.5 ABNORMAL WEIGHT GAIN: ICD-10-CM

## 2025-03-27 DIAGNOSIS — E66.01 MORBID OBESITY: ICD-10-CM

## 2025-03-27 LAB
AMORPHOUS: PRESENT
BASOPHILS ABSOLUTE: 0.07 K/UL (ref 0–0.2)
BASOPHILS RELATIVE PERCENT: 1 % (ref 0–2)
BILIRUBIN, URINE: NEGATIVE
COLOR, UA: YELLOW
CREATININE URINE: 288.4 MG/DL (ref 29–226)
CRYSTALS, UA: ABNORMAL /HPF
EOSINOPHILS ABSOLUTE: 0.12 K/UL (ref 0.05–0.5)
EOSINOPHILS RELATIVE PERCENT: 1 % (ref 0–6)
GLUCOSE URINE: NEGATIVE MG/DL
HBA1C MFR BLD: 4.9 % (ref 4–5.6)
HCT VFR BLD CALC: 46.2 % (ref 34–48)
HEMOGLOBIN: 15.8 G/DL (ref 11.5–15.5)
IMMATURE GRANULOCYTES %: 0 % (ref 0–5)
IMMATURE GRANULOCYTES ABSOLUTE: <0.03 K/UL (ref 0–0.58)
KETONES, URINE: ABNORMAL MG/DL
LEUKOCYTE ESTERASE, URINE: NEGATIVE
LYMPHOCYTES ABSOLUTE: 2.43 K/UL (ref 1.5–4)
LYMPHOCYTES RELATIVE PERCENT: 28 % (ref 20–42)
MCH RBC QN AUTO: 29.9 PG (ref 26–35)
MCHC RBC AUTO-ENTMCNC: 34.2 G/DL (ref 32–34.5)
MCV RBC AUTO: 87.3 FL (ref 80–99.9)
MICROALBUMIN/CREAT 24H UR: 35 MG/L (ref 0–19)
MICROALBUMIN/CREAT UR-RTO: 12 MCG/MG CREAT (ref 0–30)
MONOCYTES ABSOLUTE: 0.46 K/UL (ref 0.1–0.95)
MONOCYTES RELATIVE PERCENT: 5 % (ref 2–12)
NEUTROPHILS ABSOLUTE: 5.45 K/UL (ref 1.8–7.3)
NEUTROPHILS RELATIVE PERCENT: 64 % (ref 43–80)
NITRITE, URINE: NEGATIVE
PDW BLD-RTO: 12.3 % (ref 11.5–15)
PH, URINE: 6 (ref 5–8)
PLATELET # BLD: 277 K/UL (ref 130–450)
PMV BLD AUTO: 12.1 FL (ref 7–12)
PROTEIN UA: NEGATIVE MG/DL
RBC # BLD: 5.29 M/UL (ref 3.5–5.5)
RBC UA: ABNORMAL /HPF
SPECIFIC GRAVITY UA: >1.03 (ref 1–1.03)
TURBIDITY: ABNORMAL
URINE HGB: NEGATIVE
UROBILINOGEN, URINE: 0.2 EU/DL (ref 0–1)
WBC # BLD: 8.6 K/UL (ref 4.5–11.5)
WBC UA: ABNORMAL /HPF

## 2025-03-27 PROCEDURE — 99214 OFFICE O/P EST MOD 30 MIN: CPT | Performed by: FAMILY MEDICINE

## 2025-03-27 PROCEDURE — 3080F DIAST BP >= 90 MM HG: CPT | Performed by: FAMILY MEDICINE

## 2025-03-27 PROCEDURE — 1036F TOBACCO NON-USER: CPT | Performed by: FAMILY MEDICINE

## 2025-03-27 PROCEDURE — G8427 DOCREV CUR MEDS BY ELIG CLIN: HCPCS | Performed by: FAMILY MEDICINE

## 2025-03-27 PROCEDURE — 3077F SYST BP >= 140 MM HG: CPT | Performed by: FAMILY MEDICINE

## 2025-03-27 PROCEDURE — G8417 CALC BMI ABV UP PARAM F/U: HCPCS | Performed by: FAMILY MEDICINE

## 2025-03-27 SDOH — ECONOMIC STABILITY: FOOD INSECURITY: WITHIN THE PAST 12 MONTHS, YOU WORRIED THAT YOUR FOOD WOULD RUN OUT BEFORE YOU GOT MONEY TO BUY MORE.: NEVER TRUE

## 2025-03-27 SDOH — ECONOMIC STABILITY: FOOD INSECURITY: WITHIN THE PAST 12 MONTHS, THE FOOD YOU BOUGHT JUST DIDN'T LAST AND YOU DIDN'T HAVE MONEY TO GET MORE.: NEVER TRUE

## 2025-03-27 ASSESSMENT — ENCOUNTER SYMPTOMS
DIARRHEA: 0
BACK PAIN: 0
ABDOMINAL PAIN: 0
BLOOD IN STOOL: 0
COUGH: 0
PHOTOPHOBIA: 0
SHORTNESS OF BREATH: 0
SORE THROAT: 0
CONSTIPATION: 0
COLOR CHANGE: 1
VOMITING: 0
NAUSEA: 0

## 2025-03-27 ASSESSMENT — PATIENT HEALTH QUESTIONNAIRE - PHQ9
SUM OF ALL RESPONSES TO PHQ QUESTIONS 1-9: 0
SUM OF ALL RESPONSES TO PHQ QUESTIONS 1-9: 0
1. LITTLE INTEREST OR PLEASURE IN DOING THINGS: NOT AT ALL
SUM OF ALL RESPONSES TO PHQ QUESTIONS 1-9: 0
2. FEELING DOWN, DEPRESSED OR HOPELESS: NOT AT ALL
SUM OF ALL RESPONSES TO PHQ QUESTIONS 1-9: 0

## 2025-03-27 NOTE — ASSESSMENT & PLAN NOTE
As above    Orders:    CBC with Auto Differential; Future    T4, Free; Future    Uric Acid; Future    TSH; Future    Hepatic Function Panel; Future    Basic Metabolic Panel; Future    Lipid Panel; Future    Hemoglobin A1C; Future    Urinalysis with Microscopic; Future    Albumin/Creatinine Ratio, Urine; Future    Catecholamines, Plasma Fractionated; Future    Catecholamines Free Urine; Future

## 2025-03-27 NOTE — ASSESSMENT & PLAN NOTE
Over J[employer] guidelines.  Patient has not been taking her medication because she states that whenever she does take the medication there is no difference in her readings or her symptoms.  Has not had her blood pressure medication in the last 3 weeks.    Orders:    CBC with Auto Differential; Future    T4, Free; Future    Uric Acid; Future    TSH; Future    Hepatic Function Panel; Future    Basic Metabolic Panel; Future    Lipid Panel; Future    Hemoglobin A1C; Future    Urinalysis with Microscopic; Future    Albumin/Creatinine Ratio, Urine; Future    Catecholamines, Plasma Fractionated; Future    Catecholamines Free Urine; Future

## 2025-03-27 NOTE — ASSESSMENT & PLAN NOTE
Had been doing well with the Victoza however has not been able to get it on a regular basis.  We will need to see if we can get different medication approved for more consistent results as she is doing better when she does have the medication.    Orders:    CBC with Auto Differential; Future    T4, Free; Future    Uric Acid; Future    TSH; Future    Hepatic Function Panel; Future    Basic Metabolic Panel; Future    Lipid Panel; Future    Hemoglobin A1C; Future    Urinalysis with Microscopic; Future    Albumin/Creatinine Ratio, Urine; Future    Catecholamines, Plasma Fractionated; Future    Catecholamines Free Urine; Future

## 2025-03-27 NOTE — PROGRESS NOTES
Year: 0     Homeless in the Last Year: No     Family History   Problem Relation Age of Onset    Cancer Mother         sclc    Arthritis Mother     High Blood Pressure Mother     Asthma Mother     Heart Disease Maternal Grandmother     Diabetes Paternal Grandmother     Heart Failure Paternal Grandmother       There are no preventive care reminders to display for this patient.  There are no preventive care reminders to display for this patient.   There are no preventive care reminders to display for this patient.   Health Maintenance Due   Topic    DTaP/Tdap/Td vaccine (1 - Tdap)      Health Maintenance   Topic Date Due    Varicella vaccine (1 of 2 - 13+ 2-dose series) Never done    HIV screen  Never done    Hepatitis C screen  Never done    Hepatitis B vaccine (1 of 3 - 19+ 3-dose series) Never done    Breast cancer screen  Never done    DTaP/Tdap/Td vaccine (1 - Tdap) 10/22/2023    Flu vaccine (1) Never done    COVID-19 Vaccine (2 - 2024-25 season) 09/01/2024    Depression Screen  03/25/2025    Lipids  11/02/2028    Hepatitis A vaccine  Aged Out    Hib vaccine  Aged Out    HPV vaccine  Aged Out    Polio vaccine  Aged Out    Meningococcal (ACWY) vaccine  Aged Out    Meningococcal B vaccine  Aged Out    Pneumococcal 0-49 years Vaccine  Aged Out    Diabetes screen  Discontinued    Cervical cancer screen  Discontinued      There are no preventive care reminders to display for this patient.   There are no preventive care reminders to display for this patient.     BP (!) 160/96   Pulse (!) 110   Temp 97 °F (36.1 °C) (Temporal)   Resp 16   Ht 1.588 m (5' 2.5\")   Wt 91.6 kg (202 lb)   LMP 08/31/2017 (Exact Date)   SpO2 99%   BMI 36.36 kg/m²     Objective   Physical Exam  Vitals reviewed.   Constitutional:       Appearance: She is well-developed. She is obese. She is ill-appearing.   HENT:      Head: Normocephalic and atraumatic.      Right Ear: Hearing and tympanic membrane normal.      Left Ear: Hearing and

## 2025-03-28 LAB
ALBUMIN: 4.1 G/DL (ref 3.5–5.2)
ALP BLD-CCNC: 73 U/L (ref 35–104)
ALT SERPL-CCNC: 41 U/L (ref 0–32)
ANION GAP SERPL CALCULATED.3IONS-SCNC: 17 MMOL/L (ref 7–16)
AST SERPL-CCNC: 24 U/L (ref 0–31)
BILIRUB SERPL-MCNC: 0.5 MG/DL (ref 0–1.2)
BILIRUBIN DIRECT: <0.2 MG/DL (ref 0–0.3)
BILIRUBIN, INDIRECT: ABNORMAL MG/DL (ref 0–1)
BUN BLDV-MCNC: 9 MG/DL (ref 6–20)
CALCIUM SERPL-MCNC: 9.5 MG/DL (ref 8.6–10.2)
CHLORIDE BLD-SCNC: 101 MMOL/L (ref 98–107)
CHOLESTEROL, TOTAL: 195 MG/DL
CO2: 21 MMOL/L (ref 22–29)
CREAT SERPL-MCNC: 0.8 MG/DL (ref 0.5–1)
GFR, ESTIMATED: >90 ML/MIN/1.73M2
GLUCOSE BLD-MCNC: 154 MG/DL (ref 74–99)
HDLC SERPL-MCNC: 50 MG/DL
LDL CHOLESTEROL: 133 MG/DL
POTASSIUM SERPL-SCNC: 3.9 MMOL/L (ref 3.5–5)
SODIUM BLD-SCNC: 139 MMOL/L (ref 132–146)
T4 FREE: 1.1 NG/DL (ref 0.9–1.7)
TOTAL PROTEIN: 7.6 G/DL (ref 6.4–8.3)
TRIGL SERPL-MCNC: 61 MG/DL
TSH SERPL DL<=0.05 MIU/L-ACNC: 0.87 UIU/ML (ref 0.27–4.2)
URIC ACID: 5.6 MG/DL (ref 2.4–5.7)
VLDLC SERPL CALC-MCNC: 12 MG/DL

## 2025-03-31 LAB
CATECHOL/URINE INTERP: ABNORMAL
CREATININE URINE /24 HR: ABNORMAL MG/D (ref 700–1600)
CREATININE URINE /VOLUME: 284 MG/DL
DOPAMINE 24 HOUR URINE: ABNORMAL UG/D (ref 71–485)
DOPAMINE, URINE, PER VOLUME: 636 UG/L
DOPAMINE, URINE, RATIO TO CREATININE: 224 UG/G CRT (ref 0–250)
EPINEPHRINE 24 HOUR URINE: ABNORMAL (ref 1–14)
EPINEPHRINE, URINE, PER VOLUME: 58 UG/L
EPINEPHRINE, URINE, RATIO TO CREATININE: 20 UG/G CRT (ref 0–20)
HOURS COLLECTED: ABNORMAL
NOREPINEPHRINE 24 HOUR URINE: ABNORMAL UG/D (ref 14–120)
NOREPINEPHRINE CREAT RATIO: 101 UG/G CRT (ref 0–45)
NOREPINEPHRINE, URINE, PER VOLUME: 286 UG/L

## 2025-04-04 ENCOUNTER — RESULTS FOLLOW-UP (OUTPATIENT)
Dept: FAMILY MEDICINE CLINIC | Age: 45
End: 2025-04-04

## 2025-04-04 DIAGNOSIS — R63.5 ABNORMAL WEIGHT GAIN: ICD-10-CM

## 2025-04-04 DIAGNOSIS — E66.01 MORBID OBESITY (HCC): ICD-10-CM

## 2025-04-04 LAB
CATECHOLAMINE INTERPRETATION, PLASMA: NORMAL
DOPAMINE: 229 PMOL/L
EPINEPHRINE PLASMA: 186 PMOL/L
NOREPINEPHRINE: 3759 PMOL/L (ref 1050–4800)

## 2025-04-04 RX ORDER — LIRAGLUTIDE 6 MG/ML
1.2 INJECTION SUBCUTANEOUS DAILY
Qty: 2 ADJUSTABLE DOSE PRE-FILLED PEN SYRINGE | Refills: 5 | Status: CANCELLED | OUTPATIENT
Start: 2025-04-04

## 2025-04-23 ENCOUNTER — E-VISIT (OUTPATIENT)
Dept: PRIMARY CARE CLINIC | Age: 45
End: 2025-04-23

## 2025-04-23 DIAGNOSIS — R30.0 DYSURIA: Primary | ICD-10-CM

## 2025-04-23 RX ORDER — NITROFURANTOIN 25; 75 MG/1; MG/1
100 CAPSULE ORAL 2 TIMES DAILY
Qty: 10 CAPSULE | Refills: 0 | Status: SHIPPED | OUTPATIENT
Start: 2025-04-23 | End: 2025-04-28

## 2025-04-23 NOTE — PROGRESS NOTES
Alejandra Franklin (1980) initiated an asynchronous digital communication through [employer].    HPI: per patient questionnaire     Exam: not applicable    Diagnoses and all orders for this visit:  Diagnoses and all orders for this visit:    Dysuria    Other orders  -     nitrofurantoin, macrocrystal-monohydrate, (MACROBID) 100 MG capsule; Take 1 capsule by mouth 2 times daily for 5 days      Antibiotic sent. Supportive care. Fu with pcp as needed     15 minutes were spent on the digital evaluation and management of this patient.    Felisa Sandoval, ULISES - CNP

## 2025-06-21 ENCOUNTER — OFFICE VISIT (OUTPATIENT)
Dept: FAMILY MEDICINE CLINIC | Age: 45
End: 2025-06-21
Payer: MEDICAID

## 2025-06-21 VITALS
RESPIRATION RATE: 16 BRPM | WEIGHT: 209 LBS | HEIGHT: 63 IN | HEART RATE: 90 BPM | TEMPERATURE: 98.4 F | OXYGEN SATURATION: 97 % | SYSTOLIC BLOOD PRESSURE: 118 MMHG | BODY MASS INDEX: 37.03 KG/M2 | DIASTOLIC BLOOD PRESSURE: 68 MMHG

## 2025-06-21 DIAGNOSIS — H65.193 OTHER NON-RECURRENT ACUTE NONSUPPURATIVE OTITIS MEDIA OF BOTH EARS: Primary | ICD-10-CM

## 2025-06-21 PROCEDURE — 99213 OFFICE O/P EST LOW 20 MIN: CPT | Performed by: STUDENT IN AN ORGANIZED HEALTH CARE EDUCATION/TRAINING PROGRAM

## 2025-06-21 PROCEDURE — 1036F TOBACCO NON-USER: CPT | Performed by: STUDENT IN AN ORGANIZED HEALTH CARE EDUCATION/TRAINING PROGRAM

## 2025-06-21 PROCEDURE — 3078F DIAST BP <80 MM HG: CPT | Performed by: STUDENT IN AN ORGANIZED HEALTH CARE EDUCATION/TRAINING PROGRAM

## 2025-06-21 PROCEDURE — G8417 CALC BMI ABV UP PARAM F/U: HCPCS | Performed by: STUDENT IN AN ORGANIZED HEALTH CARE EDUCATION/TRAINING PROGRAM

## 2025-06-21 PROCEDURE — G8427 DOCREV CUR MEDS BY ELIG CLIN: HCPCS | Performed by: STUDENT IN AN ORGANIZED HEALTH CARE EDUCATION/TRAINING PROGRAM

## 2025-06-21 PROCEDURE — 3074F SYST BP LT 130 MM HG: CPT | Performed by: STUDENT IN AN ORGANIZED HEALTH CARE EDUCATION/TRAINING PROGRAM

## 2025-06-21 RX ORDER — AZITHROMYCIN 250 MG/1
TABLET, FILM COATED ORAL
Qty: 6 TABLET | Refills: 0 | Status: SHIPPED | OUTPATIENT
Start: 2025-06-21 | End: 2025-07-01

## 2025-06-21 NOTE — PROGRESS NOTES
[facility]  [address] [address]  Dept: [phone]  Dept Fax: [phone]  Loc: [phone]   DATE OF VISIT : 2025      Patient:  Alejandra Franklin  Age: 44 y.o.       : 1980      Chief complaint:   Chief Complaint   Patient presents with    Ear Pain    Pharyngitis         History of Present Illness     Alejandra Franklin is a 44 y.o. female who presented to the clinic today for earache.  Reports symptoms starting about 3 to 4 days ago.  Has worsened since then.  Has not taken any medications for this.  Does report significant other being ill 1 week prior and required antibiotics.  She denies any nausea or emesis.  Has experienced sinus congestion and sore throat beginning today.        Medication List:    Current Outpatient Medications   Medication Sig Dispense Refill    azithromycin (ZITHROMAX) 250 MG tablet 500mg on day 1 followed by 250mg on days 2 - 5 6 tablet 0    Insulin Pen Needle 32G X 4 MM MISC 1 each by Does not apply route daily 100 each 3    dulaglutide (TRULICITY) 1.5 MG/0.5ML SC injection Inject 0.5 mLs into the skin every 7 days 2 mL 0    Liraglutide (VICTOZA) 18 MG/3ML SOPN SC injection Inject 1.2 mg into the skin daily 2 Adjustable Dose Pre-filled Pen Syringe 5    VYVANSE 70 MG capsule Take 1 capsule by mouth daily.      clindamycin-benzoyl peroxide (BENZACLIN) 1-5 % gel Apply topically 2 times daily. 50 g 5    diazePAM (VALIUM) 5 MG tablet Take 1 tablet by mouth 2 times daily.      fluticasone (FLONASE) 50 MCG/ACT nasal spray 1 spray by Each Nostril route daily 16 g 2    fluconazole (DIFLUCAN) 150 MG tablet 1 tab po x 1 dose, may repeat in 72 hrs if still symptomatic. (Patient not taking: Reported on 2025) 2 tablet 0    amLODIPine (NORVASC) 10 MG tablet Take 1 tablet by mouth daily (Patient not taking: Reported on 2025) 30 tablet 5    carvedilol (COREG) 6.25 MG tablet Take 1 tablet by mouth